# Patient Record
Sex: FEMALE | Race: BLACK OR AFRICAN AMERICAN | NOT HISPANIC OR LATINO | Employment: OTHER | URBAN - METROPOLITAN AREA
[De-identification: names, ages, dates, MRNs, and addresses within clinical notes are randomized per-mention and may not be internally consistent; named-entity substitution may affect disease eponyms.]

---

## 2017-01-23 ENCOUNTER — OFFICE VISIT (OUTPATIENT)
Dept: MEDICAL GROUP | Facility: MEDICAL CENTER | Age: 62
End: 2017-01-23
Payer: MEDICARE

## 2017-01-23 VITALS
HEART RATE: 64 BPM | TEMPERATURE: 98.4 F | BODY MASS INDEX: 26.78 KG/M2 | SYSTOLIC BLOOD PRESSURE: 112 MMHG | RESPIRATION RATE: 14 BRPM | WEIGHT: 170.6 LBS | DIASTOLIC BLOOD PRESSURE: 70 MMHG | OXYGEN SATURATION: 97 % | HEIGHT: 67 IN

## 2017-01-23 DIAGNOSIS — R52 PAIN: ICD-10-CM

## 2017-01-23 DIAGNOSIS — E78.5 DYSLIPIDEMIA: ICD-10-CM

## 2017-01-23 DIAGNOSIS — E05.00 GRAVES' DISEASE: ICD-10-CM

## 2017-01-23 DIAGNOSIS — I10 ESSENTIAL HYPERTENSION: ICD-10-CM

## 2017-01-23 PROCEDURE — 99214 OFFICE O/P EST MOD 30 MIN: CPT | Performed by: INTERNAL MEDICINE

## 2017-01-23 PROCEDURE — 3014F SCREEN MAMMO DOC REV: CPT | Mod: 8P | Performed by: INTERNAL MEDICINE

## 2017-01-23 PROCEDURE — 4004F PT TOBACCO SCREEN RCVD TLK: CPT | Performed by: INTERNAL MEDICINE

## 2017-01-23 PROCEDURE — G8432 DEP SCR NOT DOC, RNG: HCPCS | Performed by: INTERNAL MEDICINE

## 2017-01-23 PROCEDURE — G8419 CALC BMI OUT NRM PARAM NOF/U: HCPCS | Performed by: INTERNAL MEDICINE

## 2017-01-23 PROCEDURE — 3017F COLORECTAL CA SCREEN DOC REV: CPT | Performed by: INTERNAL MEDICINE

## 2017-01-23 PROCEDURE — G8484 FLU IMMUNIZE NO ADMIN: HCPCS | Performed by: INTERNAL MEDICINE

## 2017-01-23 RX ORDER — OXYCODONE HYDROCHLORIDE 5 MG/1
5 TABLET ORAL 2 TIMES DAILY PRN
Qty: 45 TAB | Refills: 0 | Status: SHIPPED | OUTPATIENT
Start: 2017-01-23 | End: 2017-04-21 | Stop reason: SDUPTHER

## 2017-01-23 RX ORDER — OXYCODONE HYDROCHLORIDE 5 MG/1
5 TABLET ORAL 2 TIMES DAILY PRN
Qty: 45 TAB | Refills: 0 | Status: SHIPPED | OUTPATIENT
Start: 2017-01-23 | End: 2017-01-23 | Stop reason: SDUPTHER

## 2017-01-23 RX ORDER — PROPYLTHIOURACIL 50 MG/1
100 TABLET ORAL 2 TIMES DAILY
Qty: 120 TAB | Refills: 3 | Status: SHIPPED | OUTPATIENT
Start: 2017-01-23 | End: 2018-02-06 | Stop reason: SDUPTHER

## 2017-01-23 NOTE — PROGRESS NOTES
"Gia is a 61 y.o. female who is here today because    Chief Complaint   Patient presents with   • Follow-Up     1 month check up        The patient's current problem list and medication list is:    Patient Active Problem List    Diagnosis Date Noted   • Opioid type dependence, continuous (CMS-HCC) 05/01/2015   • Dyslipidemia 09/30/2014   • Graves' disease 01/09/2014   • Diabetes mellitus type 2 in nonobese (CMS-HCC) 09/22/2013   • Essential hypertension 04/15/2013   • Fibroid uterus 06/20/2011   • Chronic headache 11/16/2010   • SOB (shortness of breath) on exertion 09/18/2009   • Seizures (CMS-HCC)    • Depression    • Anxiety    • Arrhythmia        Current Outpatient Prescriptions   Medication Sig Dispense Refill   • propylthiouracil (PTU) 50 MG Tab Take 2 Tabs by mouth 2 times a day. 120 Tab 3   • oxycodone immediate-release (ROXICODONE) 5 MG Tab Take 1 Tab by mouth 2 times a day as needed. 45 Tab 0   • simvastatin (ZOCOR) 20 MG Tab Take 1 Tab by mouth every evening. 90 Tab 1   • metoprolol (LOPRESSOR) 50 MG Tab Take 1 Tab by mouth 2 times a day. 180 Tab 1   • metoprolol SR (TOPROL XL) 50 MG TABLET SR 24 HR Take 1 Tab by mouth 2 Times a Day. 60 Tab 3   • phenytoin ER (DILANTIN) 100 MG CAPS Take 1 Cap by mouth 2 times a day. 60 Cap 2   • nitroglycerin (NITROSTAT) 0.4 MG SUBL Place 1 Tab under tongue as needed for Chest Pain. 25 Tab 1   • albuterol (PROVENTIL HFA) 108 (90 BASE) MCG/ACT AERS Inhale 2 Puffs by mouth every four hours as needed for Shortness of Breath. 1 Inhaler 3   • ASPIRIN 81 MG PO TBEC Take  by mouth. qd        No current facility-administered medications for this visit.     HPI  Gia returns with a friend for one month follow up. Diagnoses of Graves' disease, Pain, Essential hypertension, and Dyslipidemia were pertinent to this visit.    1. Graves' disease  Patient feels like she is starting to \"speed up.\" This has been a signal in the past that her thyroid is becoming overactive. Last TSH " "was in June: 0.96. No specific tachycardia, tremor. She feels like she is losing weight. Our record shows a gain of 4 pounds since mid December.     2. Chronic headaches  Headaches are unchanged. She had agreed to decrease her use of oxycodone after the first of the year. Now says she will try, although it makes her nervous.    3. Essential hypertension  No postural symptoms. Energy level is good.     4. Dyslipidemia  She is tolerating the simvastatin well. Wants to retest before changing the dose. Pretreatment level of LDL ws 143.       ROS Denies chest pain, shortness of breath, changes in bowel and bladder function, lower extremity edema.    Allergies as of 01/23/2017 - Julio as Reviewed 01/23/2017   Allergen Reaction Noted   • Nkda [no known drug allergy]  09/18/2009      /70 mmHg  Pulse 64  Temp(Src) 36.9 °C (98.4 °F)  Resp 14  Ht 1.702 m (5' 7\")  Wt 77.384 kg (170 lb 9.6 oz)  BMI 26.71 kg/m2  SpO2 97%    PHYSICAL EXAMINATION  Gen:         Alert and oriented, No apparent distress. Looks well. No tremor.   Neck:       No Jugular venous distension, Lymphadenopathy, Bruits. Thyroid slightly enlarged and smooth.   Lungs:     Clear to auscultation bilaterally  CV:          Regular rate and rhythm. No murmurs heard  Abd:         Soft, non distended.                    Ext:          No clubbing, cyanosis, edema.    ASSESSMENT AND PLAN     61 y.o. female     1. Graves' disease  Subjectively hyperthyroid again.   - propylthiouracil (PTU) 50 MG Tab; Take 2 Tabs by mouth 2 times a day.  Dispense: 120 Tab; Refill: 3 (HOLD for now)  - TSH; Future  - FREE THYROXINE; Future    2. Chronic headaches  Two months prescriptions given with #45 instead if 60.  - oxycodone immediate-release (ROXICODONE) 5 MG Tab; Take 1 Tab by mouth 2 times a day as needed.  Dispense: 45 Tab; Refill: 0    3. Essential hypertension  Adequately controlled with current medication(s); no indication for change.    4. Dyslipidemia  I suggested " increased the statin; patient declines, wants to retest.                                                                                                           - COMP METABOLIC PANEL; Future  - LIPID PROFILE; Future      Followup: 2 months

## 2017-01-23 NOTE — MR AVS SNAPSHOT
"Gia Guy Marleny   2017 11:00 AM   Office Visit   MRN: [id number]    Department:  [address] [place] [address] Group   Dept Phone:  [phone]    Description:  Female : 1955   Provider:  Moises Pradhan M.D.           Reason for Visit     Follow-Up 1 month check up       Allergies as of 2017     Allergen Noted Reactions    Nkda [No Known Drug Allergy] 2009         You were diagnosed with     Graves' disease   [277437]       Pain   [264996]       Essential hypertension   [4888284]       Dyslipidemia   [263161]         Vital Signs     Blood Pressure Pulse Temperature Respirations Height Weight    112/70 mmHg 64 36.9 °C (98.4 °F) 14 1.702 m (5' 7\") 77.384 kg (170 lb 9.6 oz)    Body Mass Index Oxygen Saturation Smoking Status             26.71 kg/m2 97% Current Every Day Smoker         Basic Information     Date Of Birth Sex Race Ethnicity Preferred Language    1955 Female Black or  Non- English      Your appointments     2017  1:50 PM   MA [id number] with RB MG 3   [facility] ([address])    [address]  [place] [address]   [phone]           No deodorant, powder, perfume or lotion under the arm or breast area.            Mar 23, 2017  8:20 AM   NEW TO YOU with Kindra Gutierrez M.D.   [facility] [address]    [address]  [address]  [place] [address] [phone]              Problem List              ICD-10-CM Priority Class Noted - Resolved    SOB (shortness of breath) on exertion R06.02   2009 - Present    Seizures (CMS-HCC) R56.9   Unknown - Present    Depression F32.9   Unknown - Present    Anxiety F41.9   Unknown - Present    Arrhythmia I49.9   Unknown - Present    Chronic headache (Chronic) R51   2010 - Present    Fibroid uterus D25.9   2011 - Present    Essential hypertension I10   4/15/2013 - Present    Diabetes mellitus type 2 in nonobese (CMS-HCC) E11.9   " 9/22/2013 - Present    Graves' disease E05.00   1/9/2014 - Present    Dyslipidemia E78.5   9/30/2014 - Present    Opioid type dependence, continuous (CMS-HCC) F11.20   5/1/2015 - Present      Health Maintenance        Date Due Completion Dates    IMM HEP B VACCINE (1 of 3 - Primary Series) 1955 ---    IMM DTaP/Tdap/Td Vaccine (1 - Tdap) 9/4/1976 ---    URINE ACR / MICROALBUMIN 8/1/2015 8/1/2014 (Postponed)    Override on 8/1/2014: Postponed    COLONOSCOPY 8/28/2015 8/28/2014    MAMMOGRAM 1/30/2016 1/30/2015, 8/1/2014 (Postponed), 4/26/2013, 12/16/2010    Override on 8/1/2014: Postponed    IMM INFLUENZA (1) 9/1/2016 9/30/2014    DIABETES MONOFILAMENT / LE EXAM 1/14/2017 1/14/2016    A1C SCREENING 6/19/2017 12/19/2016, 10/5/2016, 6/20/2016, 6/2/2015, 3/13/2015, 9/17/2013, 5/17/2007    FASTING LIPID PROFILE 6/20/2017 6/20/2016, 3/13/2015, 9/8/2014, 9/17/2013, 12/16/2010, 11/2/2009, 1/27/2009, 1/31/2008    SERUM CREATININE 6/20/2017 6/20/2016, 6/2/2015, 9/8/2014, 3/21/2013, 10/12/2012, 4/1/2011, 12/16/2010, 11/2/2009, 1/27/2009, 4/22/2008, 1/31/2008, 5/17/2007, 2/12/2007, 9/18/2006, 8/25/2005, 3/24/2005    RETINAL SCREENING 12/19/2017 12/19/2016, 3/31/2016    PAP SMEAR 2/19/2018 2/19/2015, 2/19/2015, 7/9/2013, 6/20/2011            Current Immunizations     Influenza TIV (IM) 9/30/2014  9:00 AM    Pneumococcal polysaccharide vaccine (PPSV-23) 9/30/2014  9:02 AM      Below and/or attached are the medications your provider expects you to take. Review all of your home medications and newly ordered medications with your provider and/or pharmacist. Follow medication instructions as directed by your provider and/or pharmacist. Please keep your medication list with you and share with your provider. Update the information when medications are discontinued, doses are changed, or new medications (including over-the-counter products) are added; and carry medication information at all times in the event of emergency situations       Allergies:  NKDA - (reactions not documented)               Medications  Valid as of: January 23, 2017 - 11:37 AM    Generic Name Brand Name Tablet Size Instructions for use    Albuterol Sulfate (Aero Soln) albuterol 108 (90 BASE) MCG/ACT Inhale 2 Puffs by mouth every four hours as needed for Shortness of Breath.        Aspirin (Tablet Delayed Response) aspirin 81 MG Take  by mouth. qd         Metoprolol Succinate (TABLET SR 24 HR) TOPROL XL 50 MG Take 1 Tab by mouth 2 Times a Day.        Metoprolol Tartrate (Tab) LOPRESSOR 50 MG Take 1 Tab by mouth 2 times a day.        Nitroglycerin (SL Tab) NITROSTAT 0.4 MG Place 1 Tab under tongue as needed for Chest Pain.        OxyCODONE HCl (Tab) ROXICODONE 5 MG Take 1 Tab by mouth 2 times a day as needed.        Phenytoin Sodium Extended (Cap) DILANTIN 100 MG Take 1 Cap by mouth 2 times a day.        Propylthiouracil (Tab) PTU 50 MG Take 2 Tabs by mouth 2 times a day.        Simvastatin (Tab) ZOCOR 20 MG Take 1 Tab by mouth every evening.        .                 Medicines prescribed today were sent to:     [facility] - [facility] [address]    [address] [place] [address]    Phone: [phone] Fax: [phone]    Open 24 Hours?: No      Medication refill instructions:       If your prescription bottle indicates you have medication refills left, it is not necessary to call your provider’s office. Please contact your pharmacy and they will refill your medication.    If your prescription bottle indicates you do not have any refills left, you may request refills at any time through one of the following ways: The online [employer] system (except Urgent Care), by calling your provider’s office, or by asking your pharmacy to contact your provider’s office with a refill request. Medication refills are processed only during regular business hours and may not be available until the next business day. Your provider may request additional information or  to have a follow-up visit with you prior to refilling your medication.   *Please Note: Medication refills are assigned a new Rx number when refilled electronically. Your pharmacy may indicate that no refills were authorized even though a new prescription for the same medication is available at the pharmacy. Please request the medicine by name with the pharmacy before contacting your provider for a refill.        Your To Do List     Future Labs/Procedures Complete By Expires    COMP METABOLIC PANEL  As directed 1/24/2018    FREE THYROXINE  As directed 7/25/2017    LIPID PROFILE  As directed 1/24/2018    TSH  As directed 1/24/2018      Other Notes About Your Plan     [place]----depression/ anxiety  Dr Dobson----endocrinologist  Dr Melissa Bloch--neurology  [facility]           [employer] Access Code: [email or web address]  Expires: 1/26/2017  8:46 AM    [employer]  A secure, online tool to manage your health information     [employer]’s Enterprise[employer]® is a secure, online tool that connects you to your personalized health information from the privacy of your home -- day or night - making it very easy for you to manage your healthcare. Once the activation process is completed, you can even access your medical information using the EnterpriseDB lori, which is available for free in the Apple Lori store or Google Play store.     Enterprise[employer] provides the following levels of access (as shown below):   My Chart Features   [facility] Doctor [facility]  Urgent  Care Non-[facility]  Primary Care  Doctor   Email your healthcare team securely and privately 24/7 X X X    Manage appointments: schedule your next appointment; view details of past/upcoming appointments X      Request prescription refills. X      View recent personal medical records, including lab and immunizations X X X X   View health record, including health history, allergies, medications X X X X   Read reports about your outpatient visits, procedures, consult and ER notes X  X X X   See your discharge summary, which is a recap of your hospital and/or ER visit that includes your diagnosis, lab results, and care plan. X X       How to register for [employer]:  1. Go to  [email or web address].  2. Click on the Sign Up Now box, which takes you to the New Member Sign Up page. You will need to provide the following information:  a. Enter your [employer] Access Code exactly as it appears at the top of this page. (You will not need to use this code after you’ve completed the sign-up process. If you do not sign up before the expiration date, you must request a new code.)   b. Enter your date of birth.   c. Enter your home email address.   d. Click Submit, and follow the next screen’s instructions.  3. Create a [employer] ID. This will be your [employer] login ID and cannot be changed, so think of one that is secure and easy to remember.  4. Create a [employer] password. You can change your password at any time.  5. Enter your Password Reset Question and Answer. This can be used at a later time if you forget your password.   6. Enter your e-mail address. This allows you to receive e-mail notifications when new information is available in [employer].  7. Click Sign Up. You can now view your health information.    For assistance activating your [employer] account, call [phone]

## 2017-01-24 ENCOUNTER — HOSPITAL ENCOUNTER (OUTPATIENT)
Dept: LAB | Facility: MEDICAL CENTER | Age: 62
End: 2017-01-24
Attending: INTERNAL MEDICINE
Payer: MEDICARE

## 2017-01-24 DIAGNOSIS — I10 ESSENTIAL HYPERTENSION: ICD-10-CM

## 2017-01-24 DIAGNOSIS — E78.5 DYSLIPIDEMIA: ICD-10-CM

## 2017-01-24 LAB
ALBUMIN SERPL BCP-MCNC: 3.9 G/DL (ref 3.2–4.9)
ALBUMIN/GLOB SERPL: 1.3 G/DL
ALP SERPL-CCNC: 95 U/L (ref 30–99)
ALT SERPL-CCNC: 20 U/L (ref 2–50)
ANION GAP SERPL CALC-SCNC: 9 MMOL/L (ref 0–11.9)
AST SERPL-CCNC: 17 U/L (ref 12–45)
BILIRUB SERPL-MCNC: 0.5 MG/DL (ref 0.1–1.5)
BUN SERPL-MCNC: 14 MG/DL (ref 8–22)
CALCIUM SERPL-MCNC: 9 MG/DL (ref 8.5–10.5)
CHLORIDE SERPL-SCNC: 109 MMOL/L (ref 96–112)
CHOLEST SERPL-MCNC: 167 MG/DL (ref 100–199)
CO2 SERPL-SCNC: 23 MMOL/L (ref 20–33)
CREAT SERPL-MCNC: 0.73 MG/DL (ref 0.5–1.4)
GLOBULIN SER CALC-MCNC: 3.1 G/DL (ref 1.9–3.5)
GLUCOSE SERPL-MCNC: 144 MG/DL (ref 65–99)
HDLC SERPL-MCNC: 54 MG/DL
LDLC SERPL CALC-MCNC: 98 MG/DL
POTASSIUM SERPL-SCNC: 4 MMOL/L (ref 3.6–5.5)
PROT SERPL-MCNC: 7 G/DL (ref 6–8.2)
SODIUM SERPL-SCNC: 141 MMOL/L (ref 135–145)
T4 FREE SERPL-MCNC: 1.2 NG/DL (ref 0.53–1.43)
TRIGL SERPL-MCNC: 77 MG/DL (ref 0–149)
TSH SERPL DL<=0.005 MIU/L-ACNC: 0.02 UIU/ML (ref 0.3–3.7)

## 2017-01-24 PROCEDURE — 84439 ASSAY OF FREE THYROXINE: CPT

## 2017-01-24 PROCEDURE — 84443 ASSAY THYROID STIM HORMONE: CPT

## 2017-01-24 PROCEDURE — 80053 COMPREHEN METABOLIC PANEL: CPT

## 2017-01-24 PROCEDURE — 36415 COLL VENOUS BLD VENIPUNCTURE: CPT

## 2017-01-24 PROCEDURE — 80061 LIPID PANEL: CPT

## 2017-01-25 ENCOUNTER — TELEPHONE (OUTPATIENT)
Dept: MEDICAL GROUP | Facility: MEDICAL CENTER | Age: 62
End: 2017-01-25

## 2017-01-25 NOTE — TELEPHONE ENCOUNTER
1. Caller Name: Gia                      Call Back Number: [phone] (home)     2. Message: States that she got her labs done yesterday, and wanted to discuss the results with you. They are final too. Please advise.    3. Patient approves office to leave a detailed voicemail/MyChart message: N\A

## 2017-02-13 ENCOUNTER — HOSPITAL ENCOUNTER (OUTPATIENT)
Dept: RADIOLOGY | Facility: MEDICAL CENTER | Age: 62
End: 2017-02-13
Attending: INTERNAL MEDICINE
Payer: MEDICARE

## 2017-02-13 DIAGNOSIS — Z12.39 SCREENING FOR BREAST CANCER: ICD-10-CM

## 2017-02-13 PROCEDURE — 77063 BREAST TOMOSYNTHESIS BI: CPT

## 2017-03-23 ENCOUNTER — OFFICE VISIT (OUTPATIENT)
Dept: MEDICAL GROUP | Facility: MEDICAL CENTER | Age: 62
End: 2017-03-23
Payer: MEDICARE

## 2017-03-23 VITALS
HEART RATE: 105 BPM | HEIGHT: 67 IN | SYSTOLIC BLOOD PRESSURE: 124 MMHG | OXYGEN SATURATION: 95 % | BODY MASS INDEX: 26.84 KG/M2 | WEIGHT: 171 LBS | TEMPERATURE: 98.6 F | DIASTOLIC BLOOD PRESSURE: 82 MMHG | RESPIRATION RATE: 14 BRPM

## 2017-03-23 DIAGNOSIS — I10 ESSENTIAL HYPERTENSION: ICD-10-CM

## 2017-03-23 DIAGNOSIS — G44.329 CHRONIC POST-TRAUMATIC HEADACHE, NOT INTRACTABLE: ICD-10-CM

## 2017-03-23 DIAGNOSIS — E05.00 GRAVES' DISEASE: ICD-10-CM

## 2017-03-23 DIAGNOSIS — R56.9 SEIZURES (HCC): ICD-10-CM

## 2017-03-23 DIAGNOSIS — F11.20 OPIOID TYPE DEPENDENCE, CONTINUOUS (HCC): ICD-10-CM

## 2017-03-23 PROCEDURE — 3014F SCREEN MAMMO DOC REV: CPT | Performed by: FAMILY MEDICINE

## 2017-03-23 PROCEDURE — 99214 OFFICE O/P EST MOD 30 MIN: CPT | Performed by: FAMILY MEDICINE

## 2017-03-23 PROCEDURE — G8432 DEP SCR NOT DOC, RNG: HCPCS | Performed by: FAMILY MEDICINE

## 2017-03-23 PROCEDURE — G8419 CALC BMI OUT NRM PARAM NOF/U: HCPCS | Performed by: FAMILY MEDICINE

## 2017-03-23 PROCEDURE — G8484 FLU IMMUNIZE NO ADMIN: HCPCS | Performed by: FAMILY MEDICINE

## 2017-03-23 PROCEDURE — 4004F PT TOBACCO SCREEN RCVD TLK: CPT | Performed by: FAMILY MEDICINE

## 2017-03-23 RX ORDER — PHENYTOIN SODIUM 100 MG/1
100 CAPSULE, EXTENDED RELEASE ORAL DAILY
Qty: 1 CAP | Refills: 0
Start: 2017-03-23 | End: 2018-07-31

## 2017-03-23 RX ORDER — NITROGLYCERIN 0.4 MG/1
0.4 TABLET SUBLINGUAL PRN
Qty: 25 TAB | Refills: 1 | Status: SHIPPED | OUTPATIENT
Start: 2017-03-23 | End: 2023-12-06

## 2017-03-23 RX ORDER — ALBUTEROL SULFATE 90 UG/1
2 AEROSOL, METERED RESPIRATORY (INHALATION) EVERY 4 HOURS PRN
Qty: 1 INHALER | Refills: 3 | Status: SHIPPED | OUTPATIENT
Start: 2017-03-23 | End: 2020-07-27 | Stop reason: SDUPTHER

## 2017-03-23 ASSESSMENT — LIFESTYLE VARIABLES: HISTORY_ALCOHOL_USE: 0

## 2017-03-23 ASSESSMENT — ENCOUNTER SYMPTOMS: DEPRESSION: 1
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
## 2017-03-31 ENCOUNTER — APPOINTMENT (OUTPATIENT)
Dept: MEDICAL GROUP | Facility: MEDICAL CENTER | Age: 62
End: 2017-03-31
Payer: MEDICARE

## 2017-04-21 ENCOUNTER — OFFICE VISIT (OUTPATIENT)
Dept: MEDICAL GROUP | Facility: MEDICAL CENTER | Age: 62
End: 2017-04-21
Payer: MEDICARE

## 2017-04-21 ENCOUNTER — HOSPITAL ENCOUNTER (OUTPATIENT)
Facility: MEDICAL CENTER | Age: 62
End: 2017-04-21
Attending: FAMILY MEDICINE
Payer: MEDICARE

## 2017-04-21 VITALS
DIASTOLIC BLOOD PRESSURE: 72 MMHG | RESPIRATION RATE: 16 BRPM | WEIGHT: 175 LBS | HEIGHT: 67 IN | HEART RATE: 90 BPM | SYSTOLIC BLOOD PRESSURE: 120 MMHG | OXYGEN SATURATION: 96 % | BODY MASS INDEX: 27.47 KG/M2 | TEMPERATURE: 98.1 F

## 2017-04-21 DIAGNOSIS — E11.65 TYPE 2 DIABETES MELLITUS WITH HYPERGLYCEMIA, WITHOUT LONG-TERM CURRENT USE OF INSULIN (HCC): ICD-10-CM

## 2017-04-21 DIAGNOSIS — R30.0 DYSURIA: ICD-10-CM

## 2017-04-21 DIAGNOSIS — G44.329 CHRONIC POST-TRAUMATIC HEADACHE, NOT INTRACTABLE: ICD-10-CM

## 2017-04-21 DIAGNOSIS — F11.20 OPIOID TYPE DEPENDENCE, CONTINUOUS (HCC): ICD-10-CM

## 2017-04-21 PROBLEM — Z72.0 TOBACCO USE: Status: ACTIVE | Noted: 2017-04-21

## 2017-04-21 LAB
APPEARANCE UR: CLEAR
BILIRUB UR STRIP-MCNC: NORMAL MG/DL
COLOR UR AUTO: YELLOW
CREAT UR-MCNC: 291.8 MG/DL
GLUCOSE UR STRIP.AUTO-MCNC: 2000 MG/DL
HBA1C MFR BLD: 7.8 % (ref ?–5.8)
INT CON NEG: NEGATIVE
INT CON POS: POSITIVE
KETONES UR STRIP.AUTO-MCNC: NORMAL MG/DL
LEUKOCYTE ESTERASE UR QL STRIP.AUTO: NORMAL
MICROALBUMIN UR-MCNC: 1.4 MG/DL
MICROALBUMIN/CREAT UR: 5 MG/G (ref 0–30)
NITRITE UR QL STRIP.AUTO: NORMAL
PH UR STRIP.AUTO: 5 [PH] (ref 5–8)
PROT UR QL STRIP: NORMAL MG/DL
RBC UR QL AUTO: NORMAL
SP GR UR STRIP.AUTO: 1.03
UROBILINOGEN UR STRIP-MCNC: NORMAL MG/DL

## 2017-04-21 PROCEDURE — 3045F PR MOST RECENT HEMOGLOBIN A1C LEVEL 7.0-9.0%: CPT | Performed by: FAMILY MEDICINE

## 2017-04-21 PROCEDURE — 4004F PT TOBACCO SCREEN RCVD TLK: CPT | Performed by: FAMILY MEDICINE

## 2017-04-21 PROCEDURE — 83036 HEMOGLOBIN GLYCOSYLATED A1C: CPT | Performed by: FAMILY MEDICINE

## 2017-04-21 PROCEDURE — G8432 DEP SCR NOT DOC, RNG: HCPCS | Performed by: FAMILY MEDICINE

## 2017-04-21 PROCEDURE — 81002 URINALYSIS NONAUTO W/O SCOPE: CPT | Performed by: FAMILY MEDICINE

## 2017-04-21 PROCEDURE — G8417 CALC BMI ABV UP PARAM F/U: HCPCS | Performed by: FAMILY MEDICINE

## 2017-04-21 PROCEDURE — 82043 UR ALBUMIN QUANTITATIVE: CPT

## 2017-04-21 PROCEDURE — 82570 ASSAY OF URINE CREATININE: CPT

## 2017-04-21 PROCEDURE — 3014F SCREEN MAMMO DOC REV: CPT | Performed by: FAMILY MEDICINE

## 2017-04-21 PROCEDURE — 99213 OFFICE O/P EST LOW 20 MIN: CPT | Performed by: FAMILY MEDICINE

## 2017-04-21 RX ORDER — OXYCODONE HYDROCHLORIDE 5 MG/1
5 TABLET ORAL 2 TIMES DAILY PRN
Qty: 45 TAB | Refills: 0 | Status: SHIPPED | OUTPATIENT
Start: 2017-04-21 | End: 2017-07-21

## 2017-04-21 ASSESSMENT — PAIN SCALES - GENERAL: PAINLEVEL: NO PAIN
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
## 2017-04-21 NOTE — MR AVS SNAPSHOT
"        Gia KUHNSEB   2017 1:40 PM   Office Visit   MRN: [id number]    Department:  [address] [place] [address] Group   Dept Phone:  [phone]    Description:  Female : 1955   Provider:  Kindra Gutierrez M.D.           Reason for Visit     Follow-Up bp, thyroid check, refills      Allergies as of 2017     No Known Allergies      You were diagnosed with     Type 2 diabetes mellitus with hyperglycemia, without long-term current use of insulin (CMS-HCC)   [5395330]       Dysuria   [788.1.ICD-9-CM]       Chronic post-traumatic headache, not intractable   [235497]       Opioid type dependence, continuous (CMS-[facility])   [304.01.ICD-9-CM]         Vital Signs     Blood Pressure Pulse Temperature Respirations Height Weight    120/72 mmHg 90 36.7 °C (98.1 °F) 16 1.702 m (5' 7.01\") 79.379 kg (175 lb)    Body Mass Index Oxygen Saturation Smoking Status             27.40 kg/m2 96% Current Every Day Smoker         Basic Information     Date Of Birth Sex Race Ethnicity Preferred Language    1955 Female Black or  Non- English      Your appointments     Jul 10, 2017  7:20 AM   New Patient with Elvin Monterroso M.D.   [facility]    [address] [facility], [address]  [facility] [address] [phone]           Please bring Photo ID, Insurance Cards, All Medication Bottles and copies of any legal documents (such as Living Will, Power of ) If speaking a language besides English please bring an adult . Please arrive 30 minutes prior for check in and registration. You will be receiving a confirmation call a few days before your appointment from our automated call confirmation system.            2017  9:40 AM   Established Patient with Kindra Gutierrez M.D.   [facility] [address]     [address]  [facility] [address]  [facility] [facility] [id number] [phone]           You will be receiving a confirmation call a few " days before your appointment from our automated call confirmation system.              Problem List              ICD-10-CM Priority Class Noted - Resolved    Seizures (CMS-HCC) R56.9   Unknown - Present    Depression F32.9   Unknown - Present    Anxiety F41.9   Unknown - Present    Arrhythmia I49.9   Unknown - Present    Fibroid uterus D25.9   6/20/2011 - Present    Essential hypertension I10   4/15/2013 - Present    Graves' disease E05.00   1/9/2014 - Present    Dyslipidemia E78.5   9/30/2014 - Present    Opioid type dependence, continuous (CMS-[facility]) F11.20   5/1/2015 - Present    Chronic post-traumatic headache, not intractable G44.329   3/23/2017 - Present    Tobacco use Z72.0   4/21/2017 - Present    Type 2 diabetes mellitus with hyperglycemia, without long-term current use of insulin ([facility]) E11.65   4/21/2017 - Present      Health Maintenance        Date Due Completion Dates    IMM HEP B VACCINE (1 of 3 - Primary Series) 1955 ---    IMM DTaP/Tdap/Td Vaccine (1 - Tdap) 9/4/1976 ---    URINE ACR / MICROALBUMIN 8/1/2015 8/1/2014 (Postponed)    Override on 8/1/2014: Postponed    COLONOSCOPY 8/28/2015 8/28/2014    DIABETES MONOFILAMENT / LE EXAM 1/14/2017 1/14/2016    A1C SCREENING 6/19/2017 12/19/2016, 10/5/2016, 6/20/2016, 6/2/2015, 3/13/2015, 9/17/2013, 5/17/2007    RETINAL SCREENING 12/19/2017 12/19/2016, 3/31/2016    FASTING LIPID PROFILE 1/24/2018 1/24/2017, 6/20/2016, 3/13/2015, 9/8/2014, 9/17/2013, 12/16/2010, 11/2/2009, 1/27/2009, 1/31/2008    SERUM CREATININE 1/24/2018 1/24/2017, 6/20/2016, 6/2/2015, 9/8/2014, 3/21/2013, 10/12/2012, 4/1/2011, 12/16/2010, 11/2/2009, 1/27/2009, 4/22/2008, 1/31/2008, 5/17/2007, 2/12/2007, 9/18/2006, 8/25/2005, 3/24/2005    MAMMOGRAM 2/13/2018 2/13/2017, 1/30/2015, 8/1/2014 (Postponed), 4/26/2013, 12/16/2010    Override on 8/1/2014: Postponed    PAP SMEAR 2/19/2018 2/19/2015, 2/19/2015, 7/9/2013, 6/20/2011            Current Immunizations     Influenza TIV (IM) 9/30/2014   9:00 AM    Pneumococcal polysaccharide vaccine (PPSV-23) 9/30/2014  9:02 AM      Below and/or attached are the medications your provider expects you to take. Review all of your home medications and newly ordered medications with your provider and/or pharmacist. Follow medication instructions as directed by your provider and/or pharmacist. Please keep your medication list with you and share with your provider. Update the information when medications are discontinued, doses are changed, or new medications (including over-the-counter products) are added; and carry medication information at all times in the event of emergency situations     Allergies:  No Known Allergies          Medications  Valid as of: April 21, 2017 -  1:45 PM    Generic Name Brand Name Tablet Size Instructions for use    Albuterol Sulfate (Aero Soln) albuterol 108 (90 BASE) MCG/ACT Inhale 2 Puffs by mouth every four hours as needed for Shortness of Breath.        Aspirin (Tablet Delayed Response) aspirin 81 MG Take  by mouth. qd         MetFORMIN HCl (Tab) GLUCOPHAGE 500 MG Take 1 Tab by mouth 2 times a day, with meals.        Metoprolol Tartrate (Tab) LOPRESSOR 50 MG Take 1 Tab by mouth 2 times a day.        Nitroglycerin (SL Tab) NITROSTAT 0.4 MG Place 1 Tab under tongue as needed for Chest Pain.        OxyCODONE HCl (Tab) ROXICODONE 5 MG Take 1 Tab by mouth 2 times a day as needed for Severe Pain.        Phenytoin Sodium Extended (Cap) DILANTIN 100 MG Take 1 Cap by mouth every day.        Propylthiouracil (Tab) PTU 50 MG Take 2 Tabs by mouth 2 times a day.        Simvastatin (Tab) ZOCOR 20 MG Take 1 Tab by mouth every evening.        .                 Medicines prescribed today were sent to:     [facility] [address] [place] [address] [place] - [address] [facility] [address]    Phone: [phone] Fax: [phone]    Open 24 Hours?: No      Medication refill instructions:       If your prescription bottle indicates you have medication refills  left, it is not necessary to call your provider’s office. Please contact your pharmacy and they will refill your medication.    If your prescription bottle indicates you do not have any refills left, you may request refills at any time through one of the following ways: The online [employer] system (except Urgent Care), by calling your provider’s office, or by asking your pharmacy to contact your provider’s office with a refill request. Medication refills are processed only during regular business hours and may not be available until the next business day. Your provider may request additional information or to have a follow-up visit with you prior to refilling your medication.   *Please Note: Medication refills are assigned a new Rx number when refilled electronically. Your pharmacy may indicate that no refills were authorized even though a new prescription for the same medication is available at the pharmacy. Please request the medicine by name with the pharmacy before contacting your provider for a refill.        Your To Do List     Future Labs/Procedures Complete By Expires    MICROALBUMIN CREAT RATIO URINE  As directed 4/21/2018      Referral     A referral request has been sent to our patient care coordination department. Please allow 3-5 business days for us to process this request and contact you either by phone or mail. If you do not hear from us by the 5th business day, please call us at [phone].        Other Notes About Your Plan     [facility]----depression/ anxiety  Dr Dobson----endocrinologist  Dr Melissa Bloch--neurology  [facility] Status: Patient Declined        Quit Tobacco Information     Do you want to quit using tobacco?    Quitting tobacco decreases risks of cancer, heart and lung disease, increases life expectancy, improves sense of taste and smell, and increases spending money, among other benefits.    If you are thinking about quitting, we can help.  • [facility] Quit Tobacco  Program: 450.696.9309  o Program occurs weekly for four weeks and includes pharmacist consultation on products to support quitting smoking or chewing tobacco. A provider referral is needed for pharmacist consultation.  • Tobacco Users Help Hotline: 6-800-QUIT-NOW (513-5415) or https://[place].quitlogix.org/  o Free, confidential telephone and online coaching for [place] residents. Sessions are designed on a schedule that is convenient for you. Eligible clients receive free nicotine replacement therapy.  • Nationally: www.smokefree.gov  o Information and professional assistance to support both immediate and long-term needs as you become, and remain, a non-smoker. Smokefree.gov allows you to choose the help that best fits your needs.

## 2017-05-16 DIAGNOSIS — E78.5 DYSLIPIDEMIA: ICD-10-CM

## 2017-05-16 RX ORDER — SIMVASTATIN 20 MG
20 TABLET ORAL EVERY EVENING
Qty: 90 TAB | Refills: 3 | Status: SHIPPED | OUTPATIENT
Start: 2017-05-16 | End: 2018-07-12 | Stop reason: SDUPTHER

## 2017-07-21 ENCOUNTER — OFFICE VISIT (OUTPATIENT)
Dept: MEDICAL GROUP | Facility: MEDICAL CENTER | Age: 62
End: 2017-07-21
Payer: MEDICARE

## 2017-07-21 VITALS
TEMPERATURE: 98.7 F | HEIGHT: 67 IN | DIASTOLIC BLOOD PRESSURE: 82 MMHG | WEIGHT: 171 LBS | BODY MASS INDEX: 26.84 KG/M2 | SYSTOLIC BLOOD PRESSURE: 130 MMHG | OXYGEN SATURATION: 98 % | RESPIRATION RATE: 16 BRPM | HEART RATE: 74 BPM

## 2017-07-21 DIAGNOSIS — Z72.0 TOBACCO USE: ICD-10-CM

## 2017-07-21 DIAGNOSIS — E78.5 DYSLIPIDEMIA: ICD-10-CM

## 2017-07-21 DIAGNOSIS — E11.65 TYPE 2 DIABETES MELLITUS WITH HYPERGLYCEMIA, WITHOUT LONG-TERM CURRENT USE OF INSULIN (HCC): ICD-10-CM

## 2017-07-21 DIAGNOSIS — Z12.11 SCREEN FOR COLON CANCER: ICD-10-CM

## 2017-07-21 DIAGNOSIS — G44.329 CHRONIC POST-TRAUMATIC HEADACHE, NOT INTRACTABLE: ICD-10-CM

## 2017-07-21 DIAGNOSIS — R51.9 NONINTRACTABLE HEADACHE, UNSPECIFIED CHRONICITY PATTERN, UNSPECIFIED HEADACHE TYPE: ICD-10-CM

## 2017-07-21 DIAGNOSIS — I10 ESSENTIAL HYPERTENSION: ICD-10-CM

## 2017-07-21 LAB
HBA1C MFR BLD: 7 % (ref ?–5.8)
INT CON NEG: NORMAL
INT CON POS: NORMAL

## 2017-07-21 PROCEDURE — 99214 OFFICE O/P EST MOD 30 MIN: CPT | Performed by: FAMILY MEDICINE

## 2017-07-21 PROCEDURE — 83036 HEMOGLOBIN GLYCOSYLATED A1C: CPT | Performed by: FAMILY MEDICINE

## 2017-07-21 RX ORDER — LANCETS 30 GAUGE
EACH MISCELLANEOUS
Qty: 100 EACH | Refills: 3 | Status: SHIPPED | OUTPATIENT
Start: 2017-07-21 | End: 2023-04-12 | Stop reason: SDUPTHER

## 2017-07-21 NOTE — PROGRESS NOTES
cc:  diabetes    Subjective:     Gia GUEVARA is a 61 y.o. female presenting for a diabetes check:  --asa: takes 81mg daily, no stomach or stool issues  --a1c:  was 7.8 on 4/2017, today is 7.0.  BGs at home have been in the 140-150s.  Is currently taking metformin 500mg once a day, occasionally will take it twice a day. Has improved her diet, is also swimming daily.  --BP: normal today.  Has been normotensive. Denies any chest pain, sob, leg swelling.  Is currently on metoprolol 50mg daily.  Last BMP was 1/2017  --LDL: last checked 1/2017.  Is on zocor 20 mg daily.  No myalgias  --tobacco: smoking, has been cutting down, is down to 3 cig/day.  Wants to quit eventually.    --eye exam: last exam 12/2016.  No vision changes.  --foot exam: last exam 1/2016.  Denies any foot, skin problems.  No numbness/tingling.  --microalbumin: last checked 4/2017.  No urinary symptoms.    Review of systems:  See above.  She also reports her headaches have returned, is drinking caffeinated products which seem to help.  Was on oxycodone for this, but prescriptions were discontinued as she refused to give a urine sample back in April.    Current outpatient prescriptions:   •  Lancets Misc, Use 1x/day and PRN for signs and symptoms of high or low blood sugar. Dx E 11.65, Disp: 100 Each, Rfl: 3  •  Blood Glucose Monitoring Suppl Kit, Test blood sugar as recommended by provider.  Dx E 11.65, Disp: 1 Kit, Rfl: PRN  •  Blood Glucose Monitoring Suppl SUPPLIES Misc, For test strips: Use 1x/day and PRN for signs and symptoms of high or low blood sugar. Dx E 11.65, Disp: 100 Each, Rfl: 3  •  metformin (GLUCOPHAGE) 500 MG Tab, Take 1 Tab by mouth every day., Disp: , Rfl:   •  simvastatin (ZOCOR) 20 MG Tab, Take 1 Tab by mouth every evening. Indications: high cholesterol, Disp: 90 Tab, Rfl: 3  •  phenytoin ER (DILANTIN) 100 MG Cap, Take 1 Cap by mouth every day., Disp: 1 Cap, Rfl: 0  •  albuterol (PROVENTIL HFA) 108 (90 BASE) MCG/ACT Aero  "Soln inhalation aerosol, Inhale 2 Puffs by mouth every four hours as needed for Shortness of Breath., Disp: 1 Inhaler, Rfl: 3  •  nitroglycerin (NITROSTAT) 0.4 MG SL Tab, Place 1 Tab under tongue as needed for Chest Pain., Disp: 25 Tab, Rfl: 1  •  propylthiouracil (PTU) 50 MG Tab, Take 2 Tabs by mouth 2 times a day., Disp: 120 Tab, Rfl: 3  •  metoprolol (LOPRESSOR) 50 MG Tab, Take 1 Tab by mouth 2 times a day., Disp: 180 Tab, Rfl: 1  •  ASPIRIN 81 MG PO TBEC, Take  by mouth. qd , Disp: , Rfl:     No Known Allergies    Past Medical History   Diagnosis Date   • Arrhythmia    • Chronic headache    • Depression    • Anxiety    • Seizures (CMS-HCC)    • Grave's disease    • Meningitis 1950   • Urinary tract infection, site not specified      Past Surgical History   Procedure Laterality Date   • Tubal ligation       Family History   Problem Relation Age of Onset   • Diabetes Mother    • Heart Disease Mother      MI   • Hypertension Mother    • Stroke Father    • Hypertension Father    • Diabetes Sister      Social History     Social History   • Marital Status: Single     Spouse Name: N/A   • Number of Children: N/A   • Years of Education: N/A     Occupational History   • Not on file.     Social History Main Topics   • Smoking status: Current Every Day Smoker -- 0.25 packs/day for 22 years     Types: Cigarettes   • Smokeless tobacco: Never Used      Comment: 1-2 cigs/day   • Alcohol Use: No   • Drug Use: No   • Sexual Activity: Not Currently     Other Topics Concern   • Not on file     Social History Narrative       Objective:     Vitals: /82 mmHg  Pulse 74  Temp(Src) 37.1 °C (98.7 °F)  Resp 16  Ht 1.702 m (5' 7\")  Wt 77.565 kg (171 lb)  BMI 26.78 kg/m2  SpO2 98%  General: Alert, pleasant, NAD  HEENT: Normocephalic.    Skin: Warm, dry, no rashes.  Musculoskeletal: Gait is normal.  Moves all extremities well.  Extremities: Distal pulses 2+ symmetric.  No edema, skin lesions; toe nails clean.   temperature and " vibratory sensation intact.  Normal monofilament.  Achilles reflexes 2+ symmetric.  Psych:  Affect/mood is normal, judgement is good, memory is intact, grooming is appropriate.    Assessment/Plan:     Gia was seen today for follow-up and medication refill.    Diagnoses and all orders for this visit:    Type 2 diabetes mellitus with hyperglycemia, without long-term current use of insulin (HCC)  Improved on the once a day metformin 500mg. We'll continue with the same regimen. Foot exam was normal. Is up-to-date on labs. F/u in 3 months  -     POCT Hemoglobin A1C  -     Lancets Misc; Use 1x/day and PRN for signs and symptoms of high or low blood sugar. Dx E 11.65  -     Blood Glucose Monitoring Suppl Kit; Test blood sugar as recommended by provider.  Dx E 11.65  -     Blood Glucose Monitoring Suppl SUPPLIES Misc; For test strips: Use 1x/day and PRN for signs and symptoms of high or low blood sugar. Dx E 11.65  -     Diabetic Monofilament Lower Extremity Exam    Essential hypertension  Stable, labs are up-to-date. Continue metoprolol     Dyslipidemia  Stable, labs are up-to-date. Continue simvastatin    Tobacco use  Advised to quit, patient is working on this.    Nonintractable headache, unspecified chronicity pattern, unspecified headache type  Chronic post-traumatic headache, not intractable  Is now off the oxycodone. Recommended trying Excedrin for her headaches as needed. Referral to the headache clinic also placed per patient request  -     REFERRAL TO NEUROLOGY    Screen for colon cancer  -     OCCULT BLOOD FECES IMMUNOASSAY (FIT); Future        Return in about 3 months (around 10/21/2017) for Diabetes.

## 2017-07-21 NOTE — MR AVS SNAPSHOT
"        Gia Guy PAOLA   2017 9:40 AM   Office Visit   MRN: [id number]    Department:  [address] [place] [address]   Dept Phone:  [phone]    Description:  Female : 1955   Provider:  Kindra Gutierrez M.D.           Reason for Visit     Follow-Up     Medication Refill           Allergies as of 2017     No Known Allergies      You were diagnosed with     Type 2 diabetes mellitus with hyperglycemia, without long-term current use of insulin (CMS-HCC)   [0109763]       Tobacco use   [374897]       Essential hypertension   [8766020]       Nonintractable headache, unspecified chronicity pattern, unspecified headache type   [6353673]       Screen for colon cancer   [746148]         Vital Signs     Blood Pressure Pulse Temperature Respirations Height Weight    130/82 mmHg 74 37.1 °C (98.7 °F) 16 1.702 m (5' 7\") 77.565 kg (171 lb)    Body Mass Index Oxygen Saturation Smoking Status             26.78 kg/m2 98% Current Every Day Smoker         Basic Information     Date Of Birth Sex Race Ethnicity Preferred Language    1955 Female Black or  Non- English      Your appointments     Oct 23, 2017 10:40 AM   Established Patient with Kindra Gutierrez M.D.   [facility] [address] ([facility])    [address] [id number] [phone]           You will be receiving a confirmation call a few days before your appointment from our automated call confirmation system.              Problem List              ICD-10-CM Priority Class Noted - Resolved    Seizures (CMS-HCC) R56.9   Unknown - Present    Arrhythmia I49.9   Unknown - Present    Fibroid uterus D25.9   2011 - Present    Essential hypertension I10   4/15/2013 - Present    Graves' disease E05.00   2014 - Present    Dyslipidemia E78.5   2014 - Present    Opioid type dependence, continuous (CMS-HCC) F11.20   2015 - Present    Chronic post-traumatic headache, not intractable G44.329   " 3/23/2017 - Present    Tobacco use Z72.0   4/21/2017 - Present    Type 2 diabetes mellitus with hyperglycemia, without long-term current use of insulin (HCC) E11.65   4/21/2017 - Present      Health Maintenance        Date Due Completion Dates    IMM HEP B VACCINE (1 of 3 - Primary Series) 1955 ---    IMM DTaP/Tdap/Td Vaccine (1 - Tdap) 9/4/1976 ---    COLONOSCOPY 8/28/2015 8/28/2014    DIABETES MONOFILAMENT / LE EXAM 1/14/2017 1/14/2016    IMM INFLUENZA (1) 9/1/2017 9/30/2014    A1C SCREENING 10/21/2017 4/21/2017, 12/19/2016, 10/5/2016, 6/20/2016, 6/2/2015, 3/13/2015, 9/17/2013, 5/17/2007    RETINAL SCREENING 12/19/2017 12/19/2016, 3/31/2016    FASTING LIPID PROFILE 1/24/2018 1/24/2017, 6/20/2016, 3/13/2015, 9/8/2014, 9/17/2013, 12/16/2010, 11/2/2009, 1/27/2009, 1/31/2008    SERUM CREATININE 1/24/2018 1/24/2017, 6/20/2016, 6/2/2015, 9/8/2014, 3/21/2013, 10/12/2012, 4/1/2011, 12/16/2010, 11/2/2009, 1/27/2009, 4/22/2008, 1/31/2008, 5/17/2007, 2/12/2007, 9/18/2006, 8/25/2005, 3/24/2005    MAMMOGRAM 2/13/2018 2/13/2017, 1/30/2015, 8/1/2014 (Postponed), 4/26/2013, 12/16/2010    Override on 8/1/2014: Postponed    PAP SMEAR 2/19/2018 2/19/2015, 2/19/2015, 7/9/2013, 6/20/2011    URINE ACR / MICROALBUMIN 4/21/2018 4/21/2017, 8/1/2014 (Postponed)    Override on 8/1/2014: Postponed            Current Immunizations     Influenza TIV (IM) 9/30/2014  9:00 AM    Pneumococcal polysaccharide vaccine (PPSV-23) 9/30/2014  9:02 AM      Below and/or attached are the medications your provider expects you to take. Review all of your home medications and newly ordered medications with your provider and/or pharmacist. Follow medication instructions as directed by your provider and/or pharmacist. Please keep your medication list with you and share with your provider. Update the information when medications are discontinued, doses are changed, or new medications (including over-the-counter products) are added; and carry medication  information at all times in the event of emergency situations     Allergies:  No Known Allergies          Medications  Valid as of: July 21, 2017 -  9:58 AM    Generic Name Brand Name Tablet Size Instructions for use    Albuterol Sulfate (Aero Soln) albuterol 108 (90 BASE) MCG/ACT Inhale 2 Puffs by mouth every four hours as needed for Shortness of Breath.        Aspirin (Tablet Delayed Response) aspirin 81 MG Take  by mouth. qd         Blood Glucose Monitoring Suppl (Kit) Blood Glucose Monitoring Suppl  Test blood sugar as recommended by provider.  Dx E 11.65        Blood Glucose Monitoring Suppl (Misc) Blood Glucose Monitoring Suppl SUPPLIES For test strips: Use 1x/day and PRN for signs and symptoms of high or low blood sugar. Dx E 11.65        Lancets (Misc) Lancets  Use 1x/day and PRN for signs and symptoms of high or low blood sugar. Dx E 11.65        MetFORMIN HCl (Tab) GLUCOPHAGE 500 MG Take 1 Tab by mouth every day.        Metoprolol Tartrate (Tab) LOPRESSOR 50 MG Take 1 Tab by mouth 2 times a day.        Nitroglycerin (SL Tab) NITROSTAT 0.4 MG Place 1 Tab under tongue as needed for Chest Pain.        Phenytoin Sodium Extended (Cap) DILANTIN 100 MG Take 1 Cap by mouth every day.        Propylthiouracil (Tab) PTU 50 MG Take 2 Tabs by mouth 2 times a day.        Simvastatin (Tab) ZOCOR 20 MG Take 1 Tab by mouth every evening. Indications: high cholesterol        .                 Medicines prescribed today were sent to:     [facility] [address] [place] [address] - [address]     [address] [facility] [address]    Phone: [phone] Fax: [phone]    Open 24 Hours?: No      Medication refill instructions:       If your prescription bottle indicates you have medication refills left, it is not necessary to call your provider’s office. Please contact your pharmacy and they will refill your medication.    If your prescription bottle indicates you do not have any refills left, you may request refills at any time through  one of the following ways: The online [employer] system (except Urgent Care), by calling your provider’s office, or by asking your pharmacy to contact your provider’s office with a refill request. Medication refills are processed only during regular business hours and may not be available until the next business day. Your provider may request additional information or to have a follow-up visit with you prior to refilling your medication.   *Please Note: Medication refills are assigned a new Rx number when refilled electronically. Your pharmacy may indicate that no refills were authorized even though a new prescription for the same medication is available at the pharmacy. Please request the medicine by name with the pharmacy before contacting your provider for a refill.        Your To Do List     Future Labs/Procedures Complete By Expires    OCCULT BLOOD FECES IMMUNOASSAY (FIT)  As directed 7/21/2018      Referral     A referral request has been sent to our patient care coordination department. Please allow 3-5 business days for us to process this request and contact you either by phone or mail. If you do not hear from us by the 5th business day, please call us at [phone].        Other Notes About Your Plan     [place]----depression/ anxiety  Dr Dobson----endocrinologist  Dr Melissa Bloch--neurology  -[place]           ChinaNetCenter Access Code: [email or web address]  Expires: 8/17/2017  4:10 AM    [employer]Center  A secure, online tool to manage your health information     [employer]’s ChinaNetCenter® is a secure, online tool that connects you to your personalized health information from the privacy of your home -- day or night - making it very easy for you to manage your healthcare. Once the activation process is completed, you can even access your medical information using the [employer]Center lori, which is available for free in the Apple Lori store or Google Play store.     [employer]Center provides the following levels of access (as shown below):   My  Chart Features   [facility] Doctor [facility]  Specialists [facility]  Urgent  Care Non-[facility]  Primary Care  Doctor   Email your healthcare team securely and privately 24/7 X X X    Manage appointments: schedule your next appointment; view details of past/upcoming appointments X      Request prescription refills. X      View recent personal medical records, including lab and immunizations X X X X   View health record, including health history, allergies, medications X X X X   Read reports about your outpatient visits, procedures, consult and ER notes X X X X   See your discharge summary, which is a recap of your hospital and/or ER visit that includes your diagnosis, lab results, and care plan. X X       How to register for AppMyDay:  1. Go to  [email or web address].  2. Click on the Sign Up Now box, which takes you to the New Member Sign Up page. You will need to provide the following information:  a. Enter your AppMyDay Access Code exactly as it appears at the top of this page. (You will not need to use this code after you’ve completed the sign-up process. If you do not sign up before the expiration date, you must request a new code.)   b. Enter your date of birth.   c. Enter your home email address.   d. Click Submit, and follow the next screen’s instructions.  3. Create a AppMyDay ID. This will be your AppMyDay login ID and cannot be changed, so think of one that is secure and easy to remember.  4. Create a AppMyDay password. You can change your password at any time.  5. Enter your Password Reset Question and Answer. This can be used at a later time if you forget your password.   6. Enter your e-mail address. This allows you to receive e-mail notifications when new information is available in AppMyDay.  7. Click Sign Up. You can now view your health information.    For assistance activating your AppMyDay account, call [phone]        Quit Tobacco Information     Do you want to quit using  tobacco?    Quitting tobacco decreases risks of cancer, heart and lung disease, increases life expectancy, improves sense of taste and smell, and increases spending money, among other benefits.    If you are thinking about quitting, we can help.  • [facility] Quit Tobacco Program: [phone]  o Program occurs weekly for four weeks and includes pharmacist consultation on products to support quitting smoking or chewing tobacco. A provider referral is needed for pharmacist consultation.  • Tobacco Users Help Hotline: 8-127-QUIT-NOW (799-6867) or https://[place].quitlogix.org/  o Free, confidential telephone and online coaching for [place] residents. Sessions are designed on a schedule that is convenient for you. Eligible clients receive free nicotine replacement therapy.  • Nationally: www.smokefree.gov  o Information and professional assistance to support both immediate and long-term needs as you become, and remain, a non-smoker. Smokefree.gov allows you to choose the help that best fits your needs.

## 2017-09-08 ENCOUNTER — PATIENT OUTREACH (OUTPATIENT)
Dept: HEALTH INFORMATION MANAGEMENT | Facility: OTHER | Age: 62
End: 2017-09-08

## 2017-09-08 NOTE — PROGRESS NOTES
Outcome: No answer / No message     WebIZ Checked & Epic Updated:  yes    HealthConnect Verified: no    Attempt # 1

## 2017-09-15 NOTE — PROGRESS NOTES
Attempt #:3    WebIZ Checked & Epic Updated: yes  HealthConnect Verified: no  Verify PCP: yes    Communication Preference Obtained: yes     Review Care Team: yes    Annual Wellness Visit Scheduling  1. Scheduling Status:Scheduled     Care Gap Scheduling (Attempt to Schedule EACH Overdue Care Gap!)     Health Maintenance Due   Topic Date Due   • COLONOSCOPY  Pt has fit test          GuzzMobile Activation: sent activation code  Gu[employer]Mobile Lori: no  Virtual Visits: no  Opt In to Text Messages: no

## 2017-10-13 ENCOUNTER — APPOINTMENT (OUTPATIENT)
Dept: NEUROLOGY | Facility: MEDICAL CENTER | Age: 62
End: 2017-10-13
Payer: MEDICARE

## 2017-10-23 ENCOUNTER — OFFICE VISIT (OUTPATIENT)
Dept: MEDICAL GROUP | Facility: MEDICAL CENTER | Age: 62
End: 2017-10-23
Payer: MEDICARE

## 2017-10-23 VITALS
BODY MASS INDEX: 25.74 KG/M2 | TEMPERATURE: 96.4 F | SYSTOLIC BLOOD PRESSURE: 128 MMHG | RESPIRATION RATE: 14 BRPM | HEART RATE: 70 BPM | DIASTOLIC BLOOD PRESSURE: 78 MMHG | WEIGHT: 164 LBS | HEIGHT: 67 IN | OXYGEN SATURATION: 97 %

## 2017-10-23 DIAGNOSIS — E11.9 TYPE 2 DIABETES MELLITUS WITHOUT COMPLICATION, WITHOUT LONG-TERM CURRENT USE OF INSULIN (HCC): ICD-10-CM

## 2017-10-23 DIAGNOSIS — I10 ESSENTIAL HYPERTENSION: ICD-10-CM

## 2017-10-23 DIAGNOSIS — J01.10 ACUTE NON-RECURRENT FRONTAL SINUSITIS: ICD-10-CM

## 2017-10-23 DIAGNOSIS — E78.5 DYSLIPIDEMIA: ICD-10-CM

## 2017-10-23 DIAGNOSIS — E05.00 GRAVES' DISEASE: ICD-10-CM

## 2017-10-23 DIAGNOSIS — Z72.0 TOBACCO USE: ICD-10-CM

## 2017-10-23 PROBLEM — E11.65 TYPE 2 DIABETES MELLITUS WITH HYPERGLYCEMIA, WITHOUT LONG-TERM CURRENT USE OF INSULIN (HCC): Status: RESOLVED | Noted: 2017-04-21 | Resolved: 2017-10-23

## 2017-10-23 LAB
HBA1C MFR BLD: 7.3 % (ref ?–5.8)
INT CON NEG: NEGATIVE
INT CON POS: POSITIVE

## 2017-10-23 PROCEDURE — 99214 OFFICE O/P EST MOD 30 MIN: CPT | Performed by: FAMILY MEDICINE

## 2017-10-23 PROCEDURE — 83036 HEMOGLOBIN GLYCOSYLATED A1C: CPT | Performed by: FAMILY MEDICINE

## 2017-10-23 RX ORDER — AMOXICILLIN AND CLAVULANATE POTASSIUM 875; 125 MG/1; MG/1
1 TABLET, FILM COATED ORAL 2 TIMES DAILY
Qty: 14 TAB | Refills: 0 | Status: SHIPPED | OUTPATIENT
Start: 2017-10-23 | End: 2017-10-30

## 2017-10-23 NOTE — PROGRESS NOTES
cc:  diabetes    Subjective:     Gia GUEVARA is a 62 y.o. female presenting for a follow up:    1. Diabetes, HTN, HLD:    --asa: takes 81mg daily, no stomach or stool issues  --a1c:  was 7.0 on 7/2017, today is 7.3.  BGs at homeRange in the 160s to 170s.  Is currently taking metformin 500mg once a day, occasionally will take it twice a day. Has improved her diet, is also swimming daily.  --BP: normal today.  Has been normotensive. Denies any chest pain, sob, leg swelling.  Is currently on metoprolol 50mg daily.  Last BMP was 1/2017  --LDL: last checked 1/2017.  Is on zocor 20 mg daily.  No myalgias  --tobacco: smoking, has been cutting down, is down to 3 cig/day.  Wants to quit eventually.    --eye exam: last exam 12/2016.  No vision changes.  --foot exam: last exam 7/2017.  Denies any foot, skin problems.  No numbness/tingling.  --microalbumin: last checked 4/2017.  No urinary symptoms.    2. Sinus issues: Has been having nasal congestion and chest congestion for the past 3 weeks, seems to be somewhat better but not resolved yet. Associated with tooth pain, nasal drainage, cough. Denies any nausea, vomiting, headaches, shortness of breath, chest pain, abdominal pain, diarrhea, rash, sick contacts, recent travel. Tried 4 doses of a friend's antibiotic, seemed to improve somewhat, she does not number the name of the antibiotic.    Review of systems:  See above.        Current Outpatient Prescriptions:   •  metformin (GLUCOPHAGE) 500 MG Tab, Take 1 Tab by mouth 2 times a day, with meals., Disp: , Rfl:   •  amoxicillin-clavulanate (AUGMENTIN) 875-125 MG Tab, Take 1 Tab by mouth 2 times a day for 7 days., Disp: 14 Tab, Rfl: 0  •  Lancets Misc, Use 1x/day and PRN for signs and symptoms of high or low blood sugar. Dx E 11.65, Disp: 100 Each, Rfl: 3  •  Blood Glucose Monitoring Suppl Kit, Test blood sugar as recommended by provider.  Dx E 11.65, Disp: 1 Kit, Rfl: PRN  •  Blood Glucose Monitoring Suppl SUPPLIES  Misc, For test strips: Use 1x/day and PRN for signs and symptoms of high or low blood sugar. Dx E 11.65, Disp: 100 Each, Rfl: 3  •  simvastatin (ZOCOR) 20 MG Tab, Take 1 Tab by mouth every evening. Indications: high cholesterol, Disp: 90 Tab, Rfl: 3  •  phenytoin ER (DILANTIN) 100 MG Cap, Take 1 Cap by mouth every day., Disp: 1 Cap, Rfl: 0  •  albuterol (PROVENTIL HFA) 108 (90 BASE) MCG/ACT Aero Soln inhalation aerosol, Inhale 2 Puffs by mouth every four hours as needed for Shortness of Breath., Disp: 1 Inhaler, Rfl: 3  •  nitroglycerin (NITROSTAT) 0.4 MG SL Tab, Place 1 Tab under tongue as needed for Chest Pain., Disp: 25 Tab, Rfl: 1  •  propylthiouracil (PTU) 50 MG Tab, Take 2 Tabs by mouth 2 times a day., Disp: 120 Tab, Rfl: 3  •  metoprolol (LOPRESSOR) 50 MG Tab, Take 1 Tab by mouth 2 times a day., Disp: 180 Tab, Rfl: 1  •  ASPIRIN 81 MG PO TBEC, Take  by mouth. qd , Disp: , Rfl:     No Known Allergies    Past Medical History:   Diagnosis Date   • Anxiety    • Arrhythmia    • Chronic headache    • Depression    • Grave's disease    • Meningitis 1948   • Seizures (CMS-HCC)    • Urinary tract infection, site not specified      Past Surgical History:   Procedure Laterality Date   • TUBAL LIGATION       Family History   Problem Relation Age of Onset   • Diabetes Mother    • Heart Disease Mother      MI   • Hypertension Mother    • Stroke Father    • Hypertension Father    • Diabetes Sister      Social History     Social History   • Marital status: Single     Spouse name: N/A   • Number of children: N/A   • Years of education: N/A     Occupational History   • Not on file.     Social History Main Topics   • Smoking status: Current Every Day Smoker     Packs/day: 0.25     Years: 22.00     Types: Cigarettes   • Smokeless tobacco: Never Used      Comment: 1-2 cigs/day   • Alcohol use No   • Drug use: No   • Sexual activity: Not Currently     Other Topics Concern   • Not on file     Social History Narrative   • No  "narrative on file       Objective:     Vitals: /78   Pulse 70   Temp (!) 35.8 °C (96.4 °F)   Resp 14   Ht 1.702 m (5' 7\")   Wt 74.4 kg (164 lb)   SpO2 97%   BMI 25.69 kg/m²   General: Alert, pleasant, NAD  HEENT: Normocephalic.  PERRL, EOMI, no icterus or pallor.  Conjunctivae and lids normal. External ears normal. Tympanic membranes pearly, opaque.  Nares patent, mucosa pink, drainage present.  Oropharynx non-erythematous, mucous membranes moist.  Neck supple.  No thyromegaly or masses palpated. No cervical or supraclavicular lymphadenopathy.  Heart: Regular rate and rhythm.  S1 and S2 normal.  No murmurs appreciated.  Respiratory: Normal respiratory effort.  Clear to auscultation bilaterally.  Abdomen: Non-distended, soft  Skin: Warm, dry, no rashes.  Musculoskeletal: Gait is normal.  Moves all extremities well.  Extremities: No leg edema.    Psych:  Affect/mood is normal, judgement is good, memory is intact, grooming is appropriate.    Assessment/Plan:     Gia was seen today for diabetes mellitus.    Diagnoses and all orders for this visit:    Type 2 diabetes mellitus without complication, without long-term current use of insulin (CMS-[facility])  Recommended that she continue the metformin twice a day. Follow-up in 3 months  -     POCT Hemoglobin A1C  -     BASIC METABOLIC PANEL; Future    Graves' disease  Due for labs, patient declines going to specialist.  -     TSH; Future  -     TRIIDOTHYRONINE; Future  -     FREE THYROXINE; Future  -     BASIC METABOLIC PANEL; Future    Essential hypertension  Stable, continue current medications    Dyslipidemia  Stable, continue statin  -     LIPID PROFILE; Future    Acute non-recurrent frontal sinusitis  Will start treatment with Augmentin. Follow up as needed  -     amoxicillin-clavulanate (AUGMENTIN) 875-125 MG Tab; Take 1 Tab by mouth 2 times a day for 7 days.    Tobacco use  Advised to quit, patient is working on this        Return in about 3 months (around " 1/23/2018) for Diabetes with RN appt.

## 2017-12-11 ENCOUNTER — TELEPHONE (OUTPATIENT)
Dept: MEDICAL GROUP | Facility: MEDICAL CENTER | Age: 62
End: 2017-12-11

## 2017-12-11 RX ORDER — DOXYCYCLINE HYCLATE 100 MG
100 TABLET ORAL 2 TIMES DAILY
Qty: 20 TAB | Refills: 0 | Status: SHIPPED | OUTPATIENT
Start: 2017-12-11 | End: 2018-05-24 | Stop reason: SDUPTHER

## 2017-12-11 NOTE — TELEPHONE ENCOUNTER
1. Caller Name: Gia                                         Call Back Number: [phone] (home)         Patient approves a detailed voicemail message: yes    patient called wanting to know if you can order stronger antibiotic due to her feeling worse than before she has also some very strong headaches.wich she said she might have to go to ER

## 2018-01-24 ENCOUNTER — HOSPITAL ENCOUNTER (OUTPATIENT)
Dept: LAB | Facility: MEDICAL CENTER | Age: 63
End: 2018-01-24
Attending: FAMILY MEDICINE
Payer: MEDICARE

## 2018-01-24 DIAGNOSIS — E78.5 DYSLIPIDEMIA: ICD-10-CM

## 2018-01-24 DIAGNOSIS — E05.00 GRAVES' DISEASE: ICD-10-CM

## 2018-01-24 DIAGNOSIS — E11.9 TYPE 2 DIABETES MELLITUS WITHOUT COMPLICATION, WITHOUT LONG-TERM CURRENT USE OF INSULIN (HCC): ICD-10-CM

## 2018-01-24 LAB
T3 SERPL-MCNC: 112.4 NG/DL (ref 60–181)
T4 FREE SERPL-MCNC: 0.75 NG/DL (ref 0.53–1.43)
TSH SERPL DL<=0.005 MIU/L-ACNC: 0.41 UIU/ML (ref 0.38–5.33)

## 2018-01-24 PROCEDURE — 80061 LIPID PANEL: CPT

## 2018-01-24 PROCEDURE — 84480 ASSAY TRIIODOTHYRONINE (T3): CPT

## 2018-01-24 PROCEDURE — 84439 ASSAY OF FREE THYROXINE: CPT

## 2018-01-24 PROCEDURE — 80048 BASIC METABOLIC PNL TOTAL CA: CPT

## 2018-01-24 PROCEDURE — 84443 ASSAY THYROID STIM HORMONE: CPT

## 2018-01-24 PROCEDURE — 36415 COLL VENOUS BLD VENIPUNCTURE: CPT

## 2018-01-25 ENCOUNTER — OFFICE VISIT (OUTPATIENT)
Dept: MEDICAL GROUP | Facility: MEDICAL CENTER | Age: 63
End: 2018-01-25
Payer: MEDICARE

## 2018-01-25 VITALS
SYSTOLIC BLOOD PRESSURE: 130 MMHG | WEIGHT: 151 LBS | HEIGHT: 67 IN | BODY MASS INDEX: 23.7 KG/M2 | DIASTOLIC BLOOD PRESSURE: 80 MMHG | OXYGEN SATURATION: 96 % | TEMPERATURE: 96.3 F | HEART RATE: 85 BPM

## 2018-01-25 DIAGNOSIS — E78.5 DYSLIPIDEMIA: ICD-10-CM

## 2018-01-25 DIAGNOSIS — E05.00 GRAVES' DISEASE: ICD-10-CM

## 2018-01-25 DIAGNOSIS — I10 ESSENTIAL HYPERTENSION: ICD-10-CM

## 2018-01-25 DIAGNOSIS — R63.4 WEIGHT LOSS: ICD-10-CM

## 2018-01-25 DIAGNOSIS — E11.9 TYPE 2 DIABETES MELLITUS WITHOUT COMPLICATION, WITHOUT LONG-TERM CURRENT USE OF INSULIN (HCC): ICD-10-CM

## 2018-01-25 LAB
ANION GAP SERPL CALC-SCNC: 8 MMOL/L (ref 0–11.9)
BUN SERPL-MCNC: 8 MG/DL (ref 8–22)
CALCIUM SERPL-MCNC: 9.1 MG/DL (ref 8.4–10.2)
CHLORIDE SERPL-SCNC: 107 MMOL/L (ref 96–112)
CHOLEST SERPL-MCNC: 210 MG/DL (ref 100–199)
CO2 SERPL-SCNC: 24 MMOL/L (ref 20–33)
CREAT SERPL-MCNC: 0.91 MG/DL (ref 0.5–1.4)
GLUCOSE SERPL-MCNC: 111 MG/DL (ref 65–99)
HBA1C MFR BLD: 7.4 % (ref ?–5.8)
HDLC SERPL-MCNC: 59 MG/DL
INT CON NEG: NORMAL
INT CON POS: NORMAL
LDLC SERPL CALC-MCNC: 140 MG/DL
POTASSIUM SERPL-SCNC: 3.8 MMOL/L (ref 3.6–5.5)
SODIUM SERPL-SCNC: 139 MMOL/L (ref 135–145)
TRIGL SERPL-MCNC: 56 MG/DL (ref 0–149)

## 2018-01-25 PROCEDURE — 83036 HEMOGLOBIN GLYCOSYLATED A1C: CPT | Performed by: FAMILY MEDICINE

## 2018-01-25 PROCEDURE — 99215 OFFICE O/P EST HI 40 MIN: CPT | Performed by: FAMILY MEDICINE

## 2018-01-25 NOTE — PROGRESS NOTES
cc:  diabetes    Subjective:     Gia GUEVARA is a 62 y.o. female presenting for:      Diabetes, HTN, HLD:    --asa: takes 81mg daily, no stomach or stool issues  --a1c:  Today is 7.4, was 7.3 10/2017.  Is currently taking metformin 500mg once a day, occasionally will take it twice a day. Diet is well rounded, is also swimming daily.  However, she has been losing a significant amount of weight.  Has not established with endocrine for her graves disease yet.  --BP: normal today.  Has been normotensive. Denies any chest pain, sob, leg swelling.  Is currently on metoprolol 50mg daily.  Last BMP was 1/2017  --LDL: last checked 1/2017.  Is on zocor 20 mg daily.  No myalgias  --tobacco: smoking, about 5 cig/day.  Wants to quit eventually.    --eye exam: last exam 12/2016.  No vision changes.  --foot exam: last exam 7/2017.  Denies any foot, skin problems.  No numbness/tingling.  --microalbumin: last checked 4/2017.  No urinary symptoms.      Review of systems:  See above.       Current Outpatient Prescriptions:   •  metformin (GLUCOPHAGE) 500 MG Tab, Take 1 Tab by mouth 2 times a day, with meals., Disp: , Rfl:   •  Lancets Misc, Use 1x/day and PRN for signs and symptoms of high or low blood sugar. Dx E 11.65, Disp: 100 Each, Rfl: 3  •  Blood Glucose Monitoring Suppl SUPPLIES Misc, For test strips: Use 1x/day and PRN for signs and symptoms of high or low blood sugar. Dx E 11.65, Disp: 100 Each, Rfl: 3  •  simvastatin (ZOCOR) 20 MG Tab, Take 1 Tab by mouth every evening. Indications: high cholesterol, Disp: 90 Tab, Rfl: 3  •  phenytoin ER (DILANTIN) 100 MG Cap, Take 1 Cap by mouth every day., Disp: 1 Cap, Rfl: 0  •  albuterol (PROVENTIL HFA) 108 (90 BASE) MCG/ACT Aero Soln inhalation aerosol, Inhale 2 Puffs by mouth every four hours as needed for Shortness of Breath., Disp: 1 Inhaler, Rfl: 3  •  propylthiouracil (PTU) 50 MG Tab, Take 2 Tabs by mouth 2 times a day., Disp: 120 Tab, Rfl: 3  •  metoprolol (LOPRESSOR)  "50 MG Tab, Take 1 Tab by mouth 2 times a day., Disp: 180 Tab, Rfl: 1  •  ASPIRIN 81 MG PO TBEC, Take  by mouth. qd , Disp: , Rfl:   •  Blood Glucose Monitoring Suppl Kit, Test blood sugar as recommended by provider.  Dx E 11.65, Disp: 1 Kit, Rfl: PRN  •  nitroglycerin (NITROSTAT) 0.4 MG SL Tab, Place 1 Tab under tongue as needed for Chest Pain., Disp: 25 Tab, Rfl: 1    No Known Allergies    Past Medical History:   Diagnosis Date   • Anxiety    • Arrhythmia    • Chronic headache    • Depression    • Grave's disease    • Meningitis 1950   • Seizures (CMS-HCC)    • Urinary tract infection, site not specified      Past Surgical History:   Procedure Laterality Date   • TUBAL LIGATION       Family History   Problem Relation Age of Onset   • Diabetes Mother    • Heart Disease Mother      MI   • Hypertension Mother    • Stroke Father    • Hypertension Father    • Diabetes Sister      Social History     Social History   • Marital status: Single     Spouse name: N/A   • Number of children: N/A   • Years of education: N/A     Occupational History   • Not on file.     Social History Main Topics   • Smoking status: Current Every Day Smoker     Packs/day: 0.25     Years: 22.00     Types: Cigarettes   • Smokeless tobacco: Never Used      Comment: 1-2 cigs/day   • Alcohol use No   • Drug use: No   • Sexual activity: Not Currently     Other Topics Concern   • Not on file     Social History Narrative   • No narrative on file       Objective:     Vitals: /80   Pulse 85   Temp (!) 35.7 °C (96.3 °F)   Ht 1.702 m (5' 7\")   Wt 68.5 kg (151 lb)   SpO2 96%   BMI 23.65 kg/m²   General: Alert, pleasant, NAD  HEENT: Normocephalic.    Psych:  Affect/mood is normal, judgement is good, memory is intact, grooming is appropriate.    Assessment/Plan:     Gia was seen today for diabetes.    Diagnoses and all orders for this visit:    Type 2 diabetes mellitus without complication, without long-term current use of insulin (CMS-HCC)  We " discussed increasing her metformin to 1000 twice a day given the somewhat elevated A1c. Patient will like to hold off on this for now. We'll recheck an A1c in 3 months. Reminded patient that she is due for an eye exam, she declined to have a retinal screening exam done today.   -     Diabetic Monofilament LE Exam  -     POCT Hemoglobin A1C    Graves' disease  Weight loss  Recommended the patient establish with endocrinology. She will think about this.  -     REFERRAL TO ENDOCRINOLOGY    Essential hypertension  Stable, due for labs. Continue current med  -     COMP METABOLIC PANEL; Future    Dyslipidemia  Stable, due for labs, continue current med  -     LIPID PROFILE; Future      Patient was seen for a total of 40 minutes face-to-face, with more than half of the time spent counseling or coordinating care regarding the above mentioned problems.       Return in about 3 months (around 4/25/2018) for Diabetes.

## 2018-01-25 NOTE — PROGRESS NOTES
RN-BHAVANI Note  Type 2 Diabets  Subjective:     Health changes since last visit/interval Hx: States she is losing her lower teeth and feeling like she is having more symptoms associated with Graves disease.  Losing weight, feeling weak, and heart racing.    Medications (including changes made today)  Current Outpatient Prescriptions   Medication Sig Dispense Refill   • metformin (GLUCOPHAGE) 500 MG Tab Take 1 Tab by mouth 2 times a day, with meals.     • Lancets Misc Use 1x/day and PRN for signs and symptoms of high or low blood sugar. Dx E 11.65 100 Each 3   • Blood Glucose Monitoring Suppl SUPPLIES Misc For test strips: Use 1x/day and PRN for signs and symptoms of high or low blood sugar. Dx E 11.65 100 Each 3   • simvastatin (ZOCOR) 20 MG Tab Take 1 Tab by mouth every evening. Indications: high cholesterol 90 Tab 3   • phenytoin ER (DILANTIN) 100 MG Cap Take 1 Cap by mouth every day. 1 Cap 0   • albuterol (PROVENTIL HFA) 108 (90 BASE) MCG/ACT Aero Soln inhalation aerosol Inhale 2 Puffs by mouth every four hours as needed for Shortness of Breath. 1 Inhaler 3   • propylthiouracil (PTU) 50 MG Tab Take 2 Tabs by mouth 2 times a day. 120 Tab 3   • metoprolol (LOPRESSOR) 50 MG Tab Take 1 Tab by mouth 2 times a day. 180 Tab 1   • ASPIRIN 81 MG PO TBEC Take  by mouth. qd      • Blood Glucose Monitoring Suppl Kit Test blood sugar as recommended by provider.  Dx E 11.65 1 Kit PRN   • nitroglycerin (NITROSTAT) 0.4 MG SL Tab Place 1 Tab under tongue as needed for Chest Pain. 25 Tab 1     No current facility-administered medications for this visit.          Taking daily ASA: Yes  Taking above medications as prescribed: Yes   Patient Denies side effects of medication.    Exercise: States she usually walks and swims but has not done much with the cold weather.  Diet: Breakfast is egg, sausage, or oatmeal.  Lunch is sandwich.  Dinner is T.V. Dinner.  Drinking water and regular Danny-Aid    Health Maintenance:   Health Maintenance  Topics with due status: Overdue       Topic Date Due    COLONOSCOPY 08/28/2015    RETINAL SCREENING 12/19/2017    FASTING LIPID PROFILE 01/24/2018    SERUM CREATININE 01/24/2018         DM:   Last A1c:   Lab Results   Component Value Date/Time    HBA1C 7.4 01/25/2018 09:29 AM      A1c goal: < 7    Glucose monitoring frequency: Testing twice daily.  trend: 130-160's.  Hypoglycemic episodes: no     Last Retinal Exam: 12/19/16 Provider: Office  Daily Foot Exam: yes  Routine Dental Exams: yes, working on getting insurance to get some partials.    Lab Results   Component Value Date/Time    MALBCRT 5 04/21/2017 01:32 PM    MICROALBUR 1.4 04/21/2017 01:32 PM        ACR Albumin/Creatinine Ratio goal <30     Diabetic complications: Graves Disease    HTN:   Blood pressure goal <140/<80 .   Currently Rx ACE/ARB: No    Dyslipidemia:    Lab Results   Component Value Date/Time    CHOLSTRLTOT 167 01/24/2017 07:25 AM    LDL 98 01/24/2017 07:25 AM    HDL 54 01/24/2017 07:25 AM    TRIGLYCERIDE 77 01/24/2017 07:25 AM       Lab Results   Component Value Date/Time    SODIUM 141 01/24/2017 07:25 AM    POTASSIUM 4.0 01/24/2017 07:25 AM    CHLORIDE 109 01/24/2017 07:25 AM    CO2 23 01/24/2017 07:25 AM    GLUCOSE 144 (H) 01/24/2017 07:25 AM    BUN 14 01/24/2017 07:25 AM    CREATININE 0.73 01/24/2017 07:25 AM    CREATININE 1.3 01/27/2009 04:34 PM     Lab Results   Component Value Date/Time    ALKPHOSPHAT 95 01/24/2017 07:25 AM    ASTSGOT 17 01/24/2017 07:25 AM    ALTSGPT 20 01/24/2017 07:25 AM    TBILIRUBIN 0.5 01/24/2017 07:25 AM        Currently Rx Statin: Yes      She  reports that she has been smoking Cigarettes.  She has a 5.50 pack-year smoking history. She has never used smokeless tobacco.    Objective:     Exam:  Monofilament: done  Monofilament testing with a 10 gram force: sensation intact: intact bilaterally  Visual Inspection: Feet without maceration, ulcers, fissures.  Pedal pulses: intact bilaterally      Plan:     - Diabetic  diet discussed in detail-plate method.  - Home glucose monitoring.  - She will test and log.    - She will walk for 20-30 minutes daily.  - Reviewed medications and advised to take metformin after meals to decrease   G.I.upset.   - Discussed importance of immunizations and yearly eye exams. Needs eye exam and does not get immunizations.  - Encouraged patient to attend diabetes education program.   -Educational material distributed.   - Advised daily foot exams. Educated on signs of infection.       Recommended medication changes: I recommend increasing her Metformin to 1000 mg BID.  She plans on getting more active and stop the regular Danny-Aid.

## 2018-02-06 DIAGNOSIS — E05.00 GRAVES' DISEASE: ICD-10-CM

## 2018-02-06 RX ORDER — PROPYLTHIOURACIL 50 MG/1
100 TABLET ORAL 2 TIMES DAILY
Qty: 120 TAB | Refills: 0 | Status: SHIPPED | OUTPATIENT
Start: 2018-02-06 | End: 2018-03-21

## 2018-02-20 ENCOUNTER — APPOINTMENT (OUTPATIENT)
Dept: ENDOCRINOLOGY | Facility: MEDICAL CENTER | Age: 63
End: 2018-02-20
Payer: MEDICARE

## 2018-03-21 ENCOUNTER — OFFICE VISIT (OUTPATIENT)
Dept: ENDOCRINOLOGY | Facility: MEDICAL CENTER | Age: 63
End: 2018-03-21
Payer: MEDICARE

## 2018-03-21 VITALS
BODY MASS INDEX: 23.86 KG/M2 | HEIGHT: 67 IN | WEIGHT: 152 LBS | SYSTOLIC BLOOD PRESSURE: 170 MMHG | DIASTOLIC BLOOD PRESSURE: 102 MMHG | OXYGEN SATURATION: 100 % | HEART RATE: 77 BPM

## 2018-03-21 DIAGNOSIS — I10 ESSENTIAL HYPERTENSION: ICD-10-CM

## 2018-03-21 DIAGNOSIS — E05.00 GRAVES DISEASE: ICD-10-CM

## 2018-03-21 DIAGNOSIS — E05.90 HYPERTHYROIDISM: ICD-10-CM

## 2018-03-21 PROCEDURE — 99204 OFFICE O/P NEW MOD 45 MIN: CPT | Performed by: INTERNAL MEDICINE

## 2018-03-21 RX ORDER — HYDROCHLOROTHIAZIDE 12.5 MG/1
12.5 TABLET ORAL DAILY
Qty: 30 TAB | Refills: 6 | Status: SHIPPED | OUTPATIENT
Start: 2018-03-21 | End: 2018-10-01 | Stop reason: SDUPTHER

## 2018-03-21 RX ORDER — METHIMAZOLE 5 MG/1
5 TABLET ORAL 3 TIMES DAILY
Qty: 30 TAB | Refills: 6 | Status: SHIPPED | OUTPATIENT
Start: 2018-03-21 | End: 2018-03-21 | Stop reason: SDUPTHER

## 2018-03-21 RX ORDER — METHIMAZOLE 5 MG/1
5 TABLET ORAL DAILY
Qty: 30 TAB | Refills: 6 | Status: SHIPPED | OUTPATIENT
Start: 2018-03-21 | End: 2018-07-31 | Stop reason: SDUPTHER

## 2018-03-21 NOTE — PROGRESS NOTES
New Patient Consult Note  Primary care physician: Kindra Gutierrez M.D.    Reason for consult: Hyperthyroidism    HPI:  Gia GUEVARA is a 62 y.o. old patient who comes in today for evaluation of hyperthyroidism. She was initially diagnosed with hyperthyroidism due to Graves' disease about 10 years ago. She was started on PTU at that time. She initially took PTU for about 2 years, she then went into remission, and had a relapse 2 years later. She has been on PTU for the past 6 years now. She is currently on  mg twice a day. She reports compliance with medications. She denies noticing fever or jaundice. She has family history of Graves' disease. No recent weight loss. No palpitations or racing heart. She complains of excessive appetite. She denies pain or pressure behind the eyes, no double vision.    ROS:  Constitutional: No unintentional weight loss  Cardiac: No palpitations or racing heart  Resp: No shortness of breath  All other systems were reviewed and were negative.    Past Medical History:  Patient Active Problem List    Diagnosis Date Noted   • Type 2 diabetes mellitus without complication, without long-term current use of insulin (CMS-HCC) 10/23/2017   • Tobacco use 04/21/2017   • Chronic post-traumatic headache, not intractable 03/23/2017   • Dyslipidemia 09/30/2014   • Graves' disease 01/09/2014   • Essential hypertension 04/15/2013   • Fibroid uterus 06/20/2011   • Seizures (CMS-HCC)    • Arrhythmia        Past Surgical History:  Past Surgical History:   Procedure Laterality Date   • TUBAL LIGATION         Allergies:  Patient has no known allergies.    Social History:  Social History     Social History   • Marital status: Single     Spouse name: N/A   • Number of children: N/A   • Years of education: N/A     Occupational History   • Not on file.     Social History Main Topics   • Smoking status: Current Every Day Smoker     Packs/day: 0.25     Years: 22.00     Types: Cigarettes   •  Smokeless tobacco: Never Used      Comment: 1-2 cigs/day   • Alcohol use No   • Drug use: No   • Sexual activity: Not Currently     Other Topics Concern   • Not on file     Social History Narrative   • No narrative on file       Family History:  Family History   Problem Relation Age of Onset   • Diabetes Mother    • Heart Disease Mother      MI   • Hypertension Mother    • Stroke Father    • Hypertension Father    • Diabetes Sister        Medications:    Current Outpatient Prescriptions:   •  hydroCHLOROthiazide (HYDRODIURIL) 12.5 MG tablet, Take 1 Tab by mouth every day., Disp: 30 Tab, Rfl: 6  •  methimazole (TAPAZOLE) 5 MG Tab, Take 1 Tab by mouth 3 times a day., Disp: 30 Tab, Rfl: 6  •  metformin (GLUCOPHAGE) 500 MG Tab, Take 1 Tab by mouth 2 times a day, with meals., Disp: , Rfl:   •  Lancets Misc, Use 1x/day and PRN for signs and symptoms of high or low blood sugar. Dx E 11.65, Disp: 100 Each, Rfl: 3  •  Blood Glucose Monitoring Suppl Kit, Test blood sugar as recommended by provider.  Dx E 11.65, Disp: 1 Kit, Rfl: PRN  •  Blood Glucose Monitoring Suppl SUPPLIES Misc, For test strips: Use 1x/day and PRN for signs and symptoms of high or low blood sugar. Dx E 11.65, Disp: 100 Each, Rfl: 3  •  simvastatin (ZOCOR) 20 MG Tab, Take 1 Tab by mouth every evening. Indications: high cholesterol, Disp: 90 Tab, Rfl: 3  •  phenytoin ER (DILANTIN) 100 MG Cap, Take 1 Cap by mouth every day., Disp: 1 Cap, Rfl: 0  •  albuterol (PROVENTIL HFA) 108 (90 BASE) MCG/ACT Aero Soln inhalation aerosol, Inhale 2 Puffs by mouth every four hours as needed for Shortness of Breath., Disp: 1 Inhaler, Rfl: 3  •  nitroglycerin (NITROSTAT) 0.4 MG SL Tab, Place 1 Tab under tongue as needed for Chest Pain., Disp: 25 Tab, Rfl: 1  •  metoprolol (LOPRESSOR) 50 MG Tab, Take 1 Tab by mouth 2 times a day., Disp: 180 Tab, Rfl: 1  •  ASPIRIN 81 MG PO TBEC, Take  by mouth. qd , Disp: , Rfl:     Physical Examination:  Vital signs: BP (!) 170/102   Pulse 77  "  Ht 1.702 m (5' 7\")   Wt 68.9 kg (152 lb)   SpO2 100%   BMI 23.81 kg/m²   General: No apparent distress, cooperative  Eyes: No scleral icterus or discharge  ENMT: Normal on external inspection of nose, lips  Neck: No abnormal masses on inspection  Resp: Normal effort, clear to auscultation bilaterally   CVS: Regular rate and rhythm, S1 S2 normal, no murmur   Extremities: No edema  Neuro: Alert and oriented  Skin: No rash  Psych: Normal mood and affect    Assessment and Plan:    1. Hyperthyroidism  · We discussed that PTU has a higher risk for hepatotoxicity, and that better choice would be methimazole, she has never tried methimazole in the past  · Discontinued PTU  · Started methimazole  · We also discussed about radioactive iodine ablation, she is interested in getting MCKENZIE for long-term treatment of hyperthyroidism  · Advised to repeat labs for TSH and free T4 every 2 weeks for now  - methimazole (TAPAZOLE) 5 MG Tab; Take 1 Tab by mouth daily.  Dispense: 30 Tab; Refill: 6  - TSH; Future  - FREE THYROXINE; Future  - TSH; Future  - FREE THYROXINE; Future  - TSH; Future  - FREE THYROXINE; Future    2. Graves disease  · Denies pain or pressure behind the eyes, no double vision  - methimazole (TAPAZOLE) 5 MG Tab; Take 1 Tab by mouth 3 times a day.  Dispense: 30 Tab; Refill: 6    3. Essential hypertension  · Her blood pressure is very high in the clinic today, she reports compliance with medications  · Continue metoprolol  · Started hydrochlorothiazide   · Advised to repeat labs for BMP in one week  - hydroCHLOROthiazide (HYDRODIURIL) 12.5 MG tablet; Take 1 Tab by mouth every day.  Dispense: 30 Tab; Refill: 6  - BASIC METABOLIC PANEL; Future    Return in about 2 months (around 5/21/2018).    Thank you for allowing me to participate in the care of this patient.    Elvin Monterroso M.D.  03/21/18    CC:   Kindra Gutierrez M.D.    This note was created using voice recognition software (Dragon). The accuracy of the " dictation is limited by the abilities of the software. I have reviewed the note prior to signing, however some errors in grammar and context are still possible. If you have any questions related to this note please do not hesitate to contact our office.

## 2018-03-29 ENCOUNTER — APPOINTMENT (OUTPATIENT)
Dept: LAB | Facility: MEDICAL CENTER | Age: 63
End: 2018-03-29
Payer: MEDICARE

## 2018-03-30 ENCOUNTER — HOSPITAL ENCOUNTER (OUTPATIENT)
Dept: LAB | Facility: MEDICAL CENTER | Age: 63
End: 2018-03-30
Attending: INTERNAL MEDICINE
Payer: MEDICARE

## 2018-03-30 DIAGNOSIS — I10 ESSENTIAL HYPERTENSION: ICD-10-CM

## 2018-03-30 LAB
ANION GAP SERPL CALC-SCNC: 10 MMOL/L (ref 0–11.9)
BUN SERPL-MCNC: 16 MG/DL (ref 8–22)
CALCIUM SERPL-MCNC: 9.8 MG/DL (ref 8.5–10.5)
CHLORIDE SERPL-SCNC: 100 MMOL/L (ref 96–112)
CO2 SERPL-SCNC: 27 MMOL/L (ref 20–33)
CREAT SERPL-MCNC: 0.86 MG/DL (ref 0.5–1.4)
GLUCOSE SERPL-MCNC: 134 MG/DL (ref 65–99)
POTASSIUM SERPL-SCNC: 3.3 MMOL/L (ref 3.6–5.5)
SODIUM SERPL-SCNC: 137 MMOL/L (ref 135–145)

## 2018-03-30 PROCEDURE — 80048 BASIC METABOLIC PNL TOTAL CA: CPT

## 2018-03-30 PROCEDURE — 36415 COLL VENOUS BLD VENIPUNCTURE: CPT

## 2018-04-02 ENCOUNTER — TELEPHONE (OUTPATIENT)
Dept: ENDOCRINOLOGY | Facility: MEDICAL CENTER | Age: 63
End: 2018-04-02

## 2018-04-02 DIAGNOSIS — E87.6 HYPOKALEMIA: ICD-10-CM

## 2018-04-02 RX ORDER — POTASSIUM CHLORIDE 20 MEQ/1
20 TABLET, EXTENDED RELEASE ORAL 2 TIMES DAILY
Qty: 60 TAB | Refills: 6 | Status: SHIPPED
Start: 2018-04-02 | End: 2020-03-13

## 2018-04-02 NOTE — TELEPHONE ENCOUNTER
----- Message from Elvin Monterroso M.D. sent at 4/2/2018  9:53 AM PDT -----  Please inform patient that potassium level is slightly low, hydrochlorothiazide can cause low potassium. I'm sending a new prescription for potassium supplement 20 mEq one tablet twice a day. I would recommend to repeat basic metabolic panel lab in one week.

## 2018-04-09 ENCOUNTER — HOSPITAL ENCOUNTER (OUTPATIENT)
Dept: LAB | Facility: MEDICAL CENTER | Age: 63
End: 2018-04-09
Attending: INTERNAL MEDICINE
Payer: MEDICARE

## 2018-04-09 DIAGNOSIS — E05.90 HYPERTHYROIDISM: ICD-10-CM

## 2018-04-09 LAB
T4 FREE SERPL-MCNC: 0.69 NG/DL (ref 0.53–1.43)
TSH SERPL DL<=0.005 MIU/L-ACNC: 1.64 UIU/ML (ref 0.38–5.33)

## 2018-04-09 PROCEDURE — 84443 ASSAY THYROID STIM HORMONE: CPT

## 2018-04-09 PROCEDURE — 84439 ASSAY OF FREE THYROXINE: CPT

## 2018-04-09 PROCEDURE — 36415 COLL VENOUS BLD VENIPUNCTURE: CPT

## 2018-04-12 ENCOUNTER — TELEPHONE (OUTPATIENT)
Dept: ENDOCRINOLOGY | Facility: MEDICAL CENTER | Age: 63
End: 2018-04-12

## 2018-04-12 NOTE — TELEPHONE ENCOUNTER
----- Message from Elvin Monterroso M.D. sent at 4/10/2018  1:44 PM PDT -----  Please inform patient that thyroid hormone levels are normal. Continue methimazole 5 mg daily.

## 2018-04-23 ENCOUNTER — HOSPITAL ENCOUNTER (OUTPATIENT)
Dept: LAB | Facility: MEDICAL CENTER | Age: 63
End: 2018-04-23
Attending: INTERNAL MEDICINE
Payer: MEDICARE

## 2018-04-23 DIAGNOSIS — E05.90 HYPERTHYROIDISM: ICD-10-CM

## 2018-04-23 LAB
T4 FREE SERPL-MCNC: 0.81 NG/DL (ref 0.53–1.43)
TSH SERPL DL<=0.005 MIU/L-ACNC: 0.95 UIU/ML (ref 0.38–5.33)

## 2018-04-23 PROCEDURE — 36415 COLL VENOUS BLD VENIPUNCTURE: CPT

## 2018-04-23 PROCEDURE — 84443 ASSAY THYROID STIM HORMONE: CPT

## 2018-04-23 PROCEDURE — 84439 ASSAY OF FREE THYROXINE: CPT

## 2018-04-24 ENCOUNTER — TELEPHONE (OUTPATIENT)
Dept: ENDOCRINOLOGY | Facility: MEDICAL CENTER | Age: 63
End: 2018-04-24

## 2018-04-24 NOTE — TELEPHONE ENCOUNTER
----- Message from Elvin Monterroso M.D. sent at 4/23/2018  1:25 PM PDT -----  Please inform patient that thyroid hormone levels are normal, no changes to medications.

## 2018-04-26 ENCOUNTER — OFFICE VISIT (OUTPATIENT)
Dept: MEDICAL GROUP | Facility: MEDICAL CENTER | Age: 63
End: 2018-04-26
Payer: MEDICARE

## 2018-04-26 ENCOUNTER — HOSPITAL ENCOUNTER (OUTPATIENT)
Facility: MEDICAL CENTER | Age: 63
End: 2018-04-26
Attending: FAMILY MEDICINE
Payer: MEDICARE

## 2018-04-26 VITALS
BODY MASS INDEX: 24.14 KG/M2 | OXYGEN SATURATION: 98 % | HEART RATE: 88 BPM | WEIGHT: 153.8 LBS | DIASTOLIC BLOOD PRESSURE: 70 MMHG | HEIGHT: 67 IN | SYSTOLIC BLOOD PRESSURE: 120 MMHG | TEMPERATURE: 98.6 F

## 2018-04-26 DIAGNOSIS — R56.9 SEIZURES (HCC): ICD-10-CM

## 2018-04-26 DIAGNOSIS — I10 ESSENTIAL HYPERTENSION: ICD-10-CM

## 2018-04-26 DIAGNOSIS — Z12.31 ENCOUNTER FOR SCREENING MAMMOGRAM FOR MALIGNANT NEOPLASM OF BREAST: ICD-10-CM

## 2018-04-26 DIAGNOSIS — E78.5 DYSLIPIDEMIA: ICD-10-CM

## 2018-04-26 DIAGNOSIS — E11.9 TYPE 2 DIABETES MELLITUS WITHOUT COMPLICATION, WITHOUT LONG-TERM CURRENT USE OF INSULIN (HCC): ICD-10-CM

## 2018-04-26 DIAGNOSIS — Z72.0 TOBACCO USE: ICD-10-CM

## 2018-04-26 LAB
HBA1C MFR BLD: 7.6 % (ref ?–5.8)
INT CON NEG: NORMAL
INT CON POS: NORMAL

## 2018-04-26 PROCEDURE — 82570 ASSAY OF URINE CREATININE: CPT

## 2018-04-26 PROCEDURE — 82043 UR ALBUMIN QUANTITATIVE: CPT

## 2018-04-26 PROCEDURE — 83036 HEMOGLOBIN GLYCOSYLATED A1C: CPT | Performed by: FAMILY MEDICINE

## 2018-04-26 PROCEDURE — 99215 OFFICE O/P EST HI 40 MIN: CPT | Performed by: FAMILY MEDICINE

## 2018-04-26 RX ORDER — METFORMIN HYDROCHLORIDE 500 MG/1
1000 TABLET, EXTENDED RELEASE ORAL 2 TIMES DAILY
COMMUNITY
Start: 2018-04-26 | End: 2018-07-31

## 2018-04-26 RX ORDER — METFORMIN HYDROCHLORIDE 500 MG/1
1000 TABLET, EXTENDED RELEASE ORAL 2 TIMES DAILY
Qty: 360 TAB | Refills: 3 | Status: SHIPPED | OUTPATIENT
Start: 2018-04-26 | End: 2018-04-26

## 2018-04-26 NOTE — PROGRESS NOTES
cc:  diabetes    Subjective:     Gia GUEVARA is a 62 y.o. female presenting Diabetes, HTN, HLD:    --asa: takes 81mg daily, no stomach or stool issues  --a1c:  Today is 7.6, was 7.4 1/2018.  Is currently taking metformin 500mg once a day, occasionally will take it twice a day.  Admits to eating a lot more carbs/bread lately.  The metformin does give her an upset stomach occasionally.   --BP: normal today.  Has been normotensive. Denies any chest pain, sob, leg swelling.  Is currently on hctz 12.5g daily and potassium.  Last BMP was 3/2018  --LDL: last checked 1/2017.  Is on zocor 20 mg daily.  No myalgias  --tobacco: smoking, has cut down to about 2  cig/day.  Wants to quit eventually.    --eye exam: last exam 12/2016.  No vision changes.  --foot exam: last exam 7/2017.  Denies any foot, skin problems.  No numbness/tingling.  --microalbumin: last checked 4/2017.  No urinary symptoms.      2. Seizures: has hx of seizures after a head injury. Was taking dilantin 100mg daily but stopped this on her own about 7-8 months ago. Last seizure was about 1-2 years ago    Review of systems:  See above.        Current Outpatient Prescriptions:   •  metFORMIN ER (GLUCOPHAGE XR) 500 MG TABLET SR 24 HR, Take 2 Tabs by mouth 2 times a day., Disp: , Rfl:   •  potassium chloride SA (KDUR) 20 MEQ Tab CR, Take 1 Tab by mouth 2 times a day., Disp: 60 Tab, Rfl: 6  •  hydroCHLOROthiazide (HYDRODIURIL) 12.5 MG tablet, Take 1 Tab by mouth every day., Disp: 30 Tab, Rfl: 6  •  methimazole (TAPAZOLE) 5 MG Tab, Take 1 Tab by mouth every day. (Patient taking differently: Take 5 mg by mouth 2 Times a Day.), Disp: 30 Tab, Rfl: 6  •  Lancets Misc, Use 1x/day and PRN for signs and symptoms of high or low blood sugar. Dx E 11.65, Disp: 100 Each, Rfl: 3  •  Blood Glucose Monitoring Suppl Kit, Test blood sugar as recommended by provider.  Dx E 11.65, Disp: 1 Kit, Rfl: PRN  •  Blood Glucose Monitoring Suppl SUPPLIES Misc, For test strips: Use  "1x/day and PRN for signs and symptoms of high or low blood sugar. Dx E 11.65, Disp: 100 Each, Rfl: 3  •  simvastatin (ZOCOR) 20 MG Tab, Take 1 Tab by mouth every evening. Indications: high cholesterol, Disp: 90 Tab, Rfl: 3  •  albuterol (PROVENTIL HFA) 108 (90 BASE) MCG/ACT Aero Soln inhalation aerosol, Inhale 2 Puffs by mouth every four hours as needed for Shortness of Breath., Disp: 1 Inhaler, Rfl: 3  •  nitroglycerin (NITROSTAT) 0.4 MG SL Tab, Place 1 Tab under tongue as needed for Chest Pain., Disp: 25 Tab, Rfl: 1  •  ASPIRIN 81 MG PO TBEC, Take  by mouth. qd , Disp: , Rfl:   •  phenytoin ER (DILANTIN) 100 MG Cap, Take 1 Cap by mouth every day., Disp: 1 Cap, Rfl: 0    No Known Allergies    Past Medical History:   Diagnosis Date   • Anxiety    • Arrhythmia    • Chronic headache    • Depression    • Grave's disease    • Meningitis 1950   • Seizures (CMS-[facility])    • Urinary tract infection, site not specified      Past Surgical History:   Procedure Laterality Date   • TUBAL LIGATION       Family History   Problem Relation Age of Onset   • Diabetes Mother    • Heart Disease Mother      MI   • Hypertension Mother    • Stroke Father    • Hypertension Father    • Diabetes Sister      Social History     Social History   • Marital status: Single     Spouse name: N/A   • Number of children: N/A   • Years of education: N/A     Occupational History   • Not on file.     Social History Main Topics   • Smoking status: Current Every Day Smoker     Packs/day: 0.25     Years: 22.00     Types: Cigarettes   • Smokeless tobacco: Never Used      Comment: 1-2 cigs/day   • Alcohol use No   • Drug use: No   • Sexual activity: Not Currently     Other Topics Concern   • Not on file     Social History Narrative   • No narrative on file       Objective:     Vitals: /70   Pulse 88   Temp 37 °C (98.6 °F)   Ht 1.702 m (5' 7\")   Wt 69.8 kg (153 lb 12.8 oz)   SpO2 98%   BMI 24.09 kg/m²   General: Alert, pleasant, NAD  HEENT: " Normocephalic.    Psych:  Affect/mood is normal, judgement is good, memory is intact, grooming is appropriate.    Assessment/Plan:     Gia was seen today for diabetes.    Diagnoses and all orders for this visit:    Type 2 diabetes mellitus without complication, without long-term current use of insulin (CMS-HCC)  Discussed increasing her metformin to 1000 mg twice a day we'll also switch to the extended release formulation to help with the GI side effect. She was planning to do the retinal exam today, but had to leave due to time.  Urine microalbumin collected today. Follow-up in 3 months.  -     Diabetic Monofilament LE Exam  -     POCT Hemoglobin A1C  -     metFORMIN ER (GLUCOPHAGE XR) 500 MG TABLET SR 24 HR; Take 2 Tabs by mouth 2 times a day.  -     Cancel: POCT Retinal Eye Exam  -     MICROALBUMIN CREAT RATIO URINE (CLINIC COLLECT); Future    Essential hypertension  Stable, continue current medications    Dyslipidemia  Stable, continue current medication    Encounter for screening mammogram for malignant neoplasm of breast  -     MA-MAMMO SCREENING BILAT W/CORI W/CAD; Future    Seizures (CMS-HCC)  Stable, off Dilantin. We'll continue to monitor    Tobacco use  Advised to quit, patient is working on this.      Patient was seen for a total of 40 minutes face-to-face by myself and the diabetes nurse, with more than half of the time spent counseling treatment, risks/benefits, and coordinating care for her diabetes, hypertension, tobacco use and the other above mentioned problems.         Return in about 3 months (around 7/26/2018) for Diabetes with RN appt (will also do pap at same time).

## 2018-04-26 NOTE — PROGRESS NOTES
RN-ILIRE Note    Subjective:     Health changes since last visit/interval Hx: General health good.  States having headaches every morning.    Medications (including changes made today)  Current Outpatient Prescriptions   Medication Sig Dispense Refill   • potassium chloride SA (KDUR) 20 MEQ Tab CR Take 1 Tab by mouth 2 times a day. 60 Tab 6   • metFORMIN (GLUCOPHAGE) 500 MG Tab Take 1 Tab by mouth 2 times a day, with meals. 180 Tab 1   • hydroCHLOROthiazide (HYDRODIURIL) 12.5 MG tablet Take 1 Tab by mouth every day. 30 Tab 6   • methimazole (TAPAZOLE) 5 MG Tab Take 1 Tab by mouth every day. (Patient taking differently: Take 5 mg by mouth 2 Times a Day.) 30 Tab 6   • Lancets Misc Use 1x/day and PRN for signs and symptoms of high or low blood sugar. Dx E 11.65 100 Each 3   • Blood Glucose Monitoring Suppl SUPPLIES Misc For test strips: Use 1x/day and PRN for signs and symptoms of high or low blood sugar. Dx E 11.65 100 Each 3   • simvastatin (ZOCOR) 20 MG Tab Take 1 Tab by mouth every evening. Indications: high cholesterol 90 Tab 3   • albuterol (PROVENTIL HFA) 108 (90 BASE) MCG/ACT Aero Soln inhalation aerosol Inhale 2 Puffs by mouth every four hours as needed for Shortness of Breath. 1 Inhaler 3   • ASPIRIN 81 MG PO TBEC Take  by mouth. qd      • Blood Glucose Monitoring Suppl Kit Test blood sugar as recommended by provider.  Dx E 11.65 1 Kit PRN   • phenytoin ER (DILANTIN) 100 MG Cap Take 1 Cap by mouth every day. 1 Cap 0   • nitroglycerin (NITROSTAT) 0.4 MG SL Tab Place 1 Tab under tongue as needed for Chest Pain. 25 Tab 1     No current facility-administered medications for this visit.          Taking daily ASA: Yes  Taking above medications as prescribed: Yes   Patient Denies side effects of medication.    Exercise: Walking dog daily  Diet: States appetite increased.  Has been eating more bread this week.    Health Maintenance:   Health Maintenance Topics with due status: Overdue       Topic Date Due    IMM HEP B  VACCINE 1955    IMM DTaP/Tdap/Td Vaccine 09/04/1976    COLONOSCOPY 08/28/2015    RETINAL SCREENING 12/19/2017    MAMMOGRAM 02/13/2018    PAP SMEAR 02/19/2018    URINE ACR / MICROALBUMIN 04/21/2018         DM:   Last A1c:   Lab Results   Component Value Date/Time    HBA1C 7.6 04/26/2018 09:01 AM      A1c goal: < 7    Glucose monitoring frequency: Not testing  trend: It was 130 today after coffee  Hypoglycemic episodes: no     Last Retinal Exam: Needs  Daily Foot Exam: yes  Routine Dental Exams: no    Lab Results   Component Value Date/Time    MALBCRT 5 04/21/2017 01:32 PM    MICROALBUR 1.4 04/21/2017 01:32 PM        ACR Albumin/Creatinine Ratio goal <30     Diabetic complications: none    HTN:   Blood pressure goal <140/<80 .   Currently Rx ACE/ARB: No    Dyslipidemia:    Lab Results   Component Value Date/Time    CHOLSTRLTOT 210 (H) 01/24/2018 07:00 AM     (H) 01/24/2018 07:00 AM    HDL 59 01/24/2018 07:00 AM    TRIGLYCERIDE 56 01/24/2018 07:00 AM       Lab Results   Component Value Date/Time    SODIUM 137 03/30/2018 07:05 AM    POTASSIUM 3.3 (L) 03/30/2018 07:05 AM    CHLORIDE 100 03/30/2018 07:05 AM    CO2 27 03/30/2018 07:05 AM    GLUCOSE 134 (H) 03/30/2018 07:05 AM    BUN 16 03/30/2018 07:05 AM    CREATININE 0.86 03/30/2018 07:05 AM    CREATININE 1.3 01/27/2009 04:34 PM     Lab Results   Component Value Date/Time    ALKPHOSPHAT 95 01/24/2017 07:25 AM    ASTSGOT 17 01/24/2017 07:25 AM    ALTSGPT 20 01/24/2017 07:25 AM    TBILIRUBIN 0.5 01/24/2017 07:25 AM        Currently Rx Statin: Yes      She  reports that she has been smoking Cigarettes.  She has a 5.50 pack-year smoking history. She has never used smokeless tobacco.    Objective:     Exam:  Monofilament: done  Monofilament testing with a 10 gram force: sensation intact: intact bilaterally  Visual Inspection: Feet without maceration, ulcers, fissures.  Pedal pulses: intact bilaterally      Plan:     - Diabetic diet discussed in detail-plate  method.  - Home glucose monitoring.  - She will test and log.    - She will walk for 20-30 minutes daily.  - Reviewed medications and advised to take metformin after meals to decrease   G.I.upset.   - Discussed importance of immunizations and yearly eye exams.   - Encouraged patient to attend diabetes education program.   -Educational material distributed.   - Advised daily foot exams. Educated on signs of infection.       Recommended medication changes: Increase her Metformin to 1000 mg BID.  She states she will increase her vegetables and decrease her carbohydrate.

## 2018-04-27 DIAGNOSIS — E11.9 TYPE 2 DIABETES MELLITUS WITHOUT COMPLICATION, WITHOUT LONG-TERM CURRENT USE OF INSULIN (HCC): ICD-10-CM

## 2018-04-27 LAB
CREAT UR-MCNC: 41.1 MG/DL
MICROALBUMIN UR-MCNC: <0.7 MG/DL
MICROALBUMIN/CREAT UR: NORMAL MG/G (ref 0–30)

## 2018-05-01 ENCOUNTER — HOSPITAL ENCOUNTER (OUTPATIENT)
Facility: MEDICAL CENTER | Age: 63
End: 2018-05-01
Attending: FAMILY MEDICINE
Payer: MEDICARE

## 2018-05-01 ENCOUNTER — OFFICE VISIT (OUTPATIENT)
Dept: MEDICAL GROUP | Facility: MEDICAL CENTER | Age: 63
End: 2018-05-01
Payer: MEDICARE

## 2018-05-01 VITALS
HEIGHT: 67 IN | TEMPERATURE: 98.6 F | HEART RATE: 74 BPM | DIASTOLIC BLOOD PRESSURE: 76 MMHG | BODY MASS INDEX: 24.17 KG/M2 | RESPIRATION RATE: 14 BRPM | WEIGHT: 154 LBS | OXYGEN SATURATION: 94 % | SYSTOLIC BLOOD PRESSURE: 120 MMHG

## 2018-05-01 DIAGNOSIS — Z72.0 TOBACCO USE: ICD-10-CM

## 2018-05-01 DIAGNOSIS — Z12.4 SCREENING FOR CERVICAL CANCER: ICD-10-CM

## 2018-05-01 DIAGNOSIS — Z11.59 NEED FOR HEPATITIS C SCREENING TEST: ICD-10-CM

## 2018-05-01 DIAGNOSIS — Z01.419 WELL FEMALE EXAM WITH ROUTINE GYNECOLOGICAL EXAM: ICD-10-CM

## 2018-05-01 DIAGNOSIS — Z11.51 SCREENING FOR HPV (HUMAN PAPILLOMAVIRUS): ICD-10-CM

## 2018-05-01 DIAGNOSIS — Z11.4 ENCOUNTER FOR SCREENING FOR HIV: ICD-10-CM

## 2018-05-01 DIAGNOSIS — G44.329 CHRONIC POST-TRAUMATIC HEADACHE, NOT INTRACTABLE: ICD-10-CM

## 2018-05-01 PROCEDURE — 88175 CYTOPATH C/V AUTO FLUID REDO: CPT

## 2018-05-01 PROCEDURE — 87624 HPV HI-RISK TYP POOLED RSLT: CPT

## 2018-05-01 PROCEDURE — G0101 CA SCREEN;PELVIC/BREAST EXAM: HCPCS | Performed by: FAMILY MEDICINE

## 2018-05-01 RX ORDER — NORTRIPTYLINE HYDROCHLORIDE 25 MG/1
25 CAPSULE ORAL
Qty: 90 CAP | Refills: 1 | Status: SHIPPED
Start: 2018-05-01 | End: 2020-03-13

## 2018-05-01 ASSESSMENT — PATIENT HEALTH QUESTIONNAIRE - PHQ9: CLINICAL INTERPRETATION OF PHQ2 SCORE: 0

## 2018-05-01 NOTE — PROGRESS NOTES
cc: well exam    Subjective:     Gia GUEVARA is a 62 y.o. female presents for a routine preventive health exam.    Ob-Gyn/ History:    /Para:    Last Pap Smear:  2015. no history of abnormal pap smears.  Gyn Surgery:  Tubal.  Current Contraceptive Method:  Is not currently sexually active.  Last menstrual period:  15-20 yrs ago  Post-menopausal bleeding: none  Urinary incontinence: none    Health Maintenance  Advanced directive: discussed, handout given  Osteoporosis Screen/ DEXA: n/a   PT/vit D for falls prevention: n/a   Cholesterol Screenin2018   Diabetes Screening: n/a  AAA Screening: n/a   Aspirin Use: taking    Diet: discussed   Exercise: trying to exercise more   Substance Abuse: none   Seat belts, bike helmet, gun safety discussed.  Sun protection used.    Cancer screening  Colorectal Cancer Screening: FIT ordered    Lung Cancer Screening: doesn't meet criteria    Cervical Cancer Screening: collected today    Breast Cancer Screening: scheduled for may     Infectious disease screening/Immunizations  --STI Screening: declined   --Practices safe sex.  --HIV Screening: ordered   --Hepatitis C Screening: ordered   --Immunizations:    Influenza: n/a    Tetanus: declined    Shingles: discussed, declined    Pneumococcal : ppsv23 2014       Review of systems:  Has been having her daily headache that otc meds have not be helping.  Denies any weight loss, fevers, fatigue, vision changes, sore throat, swollen glands, rashes, shortness of breath, chest pain, numbness, nausea, vomiting, diarrhea, constipation, abdominal pain, dysuria, hematuria,  joint pain, muscle weakness, depression.  All other systems were reviewed and are negative.      Current Outpatient Prescriptions:   •  nortriptyline (PAMELOR) 25 MG Cap, Take 1 Cap by mouth every bedtime., Disp: 90 Cap, Rfl: 1  •  metFORMIN ER (GLUCOPHAGE XR) 500 MG TABLET SR 24 HR, Take 2 Tabs by mouth 2 times a day., Disp: , Rfl:   •  potassium  chloride SA (KDUR) 20 MEQ Tab CR, Take 1 Tab by mouth 2 times a day., Disp: 60 Tab, Rfl: 6  •  hydroCHLOROthiazide (HYDRODIURIL) 12.5 MG tablet, Take 1 Tab by mouth every day., Disp: 30 Tab, Rfl: 6  •  methimazole (TAPAZOLE) 5 MG Tab, Take 1 Tab by mouth every day. (Patient taking differently: Take 5 mg by mouth 2 Times a Day.), Disp: 30 Tab, Rfl: 6  •  Lancets Misc, Use 1x/day and PRN for signs and symptoms of high or low blood sugar. Dx E 11.65, Disp: 100 Each, Rfl: 3  •  Blood Glucose Monitoring Suppl Kit, Test blood sugar as recommended by provider.  Dx E 11.65, Disp: 1 Kit, Rfl: PRN  •  Blood Glucose Monitoring Suppl SUPPLIES Misc, For test strips: Use 1x/day and PRN for signs and symptoms of high or low blood sugar. Dx E 11.65, Disp: 100 Each, Rfl: 3  •  simvastatin (ZOCOR) 20 MG Tab, Take 1 Tab by mouth every evening. Indications: high cholesterol, Disp: 90 Tab, Rfl: 3  •  phenytoin ER (DILANTIN) 100 MG Cap, Take 1 Cap by mouth every day., Disp: 1 Cap, Rfl: 0  •  albuterol (PROVENTIL HFA) 108 (90 BASE) MCG/ACT Aero Soln inhalation aerosol, Inhale 2 Puffs by mouth every four hours as needed for Shortness of Breath., Disp: 1 Inhaler, Rfl: 3  •  nitroglycerin (NITROSTAT) 0.4 MG SL Tab, Place 1 Tab under tongue as needed for Chest Pain., Disp: 25 Tab, Rfl: 1  •  ASPIRIN 81 MG PO TBEC, Take  by mouth. qd , Disp: , Rfl:     No Known Allergies    Past Medical History:   Diagnosis Date   • Anxiety    • Arrhythmia    • Chronic headache    • Depression    • Grave's disease    • Meningitis 1950   • Seizures (HCC)    • Urinary tract infection, site not specified      Past Surgical History:   Procedure Laterality Date   • TUBAL LIGATION       Family History   Problem Relation Age of Onset   • Diabetes Mother    • Heart Disease Mother      MI   • Hypertension Mother    • Stroke Father    • Hypertension Father    • Diabetes Sister      Social History     Social History   • Marital status: Single     Spouse name: N/A   •  "Number of children: N/A   • Years of education: N/A     Occupational History   • Not on file.     Social History Main Topics   • Smoking status: Current Every Day Smoker     Packs/day: 0.25     Years: 22.00     Types: Cigarettes   • Smokeless tobacco: Never Used      Comment: 1-2 cigs/day   • Alcohol use No   • Drug use: No   • Sexual activity: Not Currently     Other Topics Concern   • Not on file     Social History Narrative   • No narrative on file       Objective:     Vitals: /76   Pulse 74   Temp 37 °C (98.6 °F)   Resp 14   Ht 1.702 m (5' 7\")   Wt 69.9 kg (154 lb)   SpO2 94%   BMI 24.12 kg/m²   General: Alert, pleasant, NAD  HEENT:  Normocephalic.  PERRL, EOMI, no icterus or pallor.  Conjunctivae and lids normal.  External ears normal. Tympanic membranes pearly, opaque.  Nares patent, mucosa pink.  Oropharynx non-erythematous, mucous membranes moist.  Neck supple.  No thyromegaly or masses palpated. No cervical or supraclavicular lymphadenopathy.  Heart:  Regular rate and rhythm.  S1 and S2 normal.  No murmurs appreciated.  Respiratory:  Normal respiratory effort.  Clear to auscultation bilaterally.  Abdomen:  Bowel sounds present.  Non-distended, soft, non-tender, no guarding/rebound. No hepatosplenomegaly.  No hernias.  Pelvic exam: Normal external genitalia including urethral meatus.  Well supported, nontender urethra and bladder.  Vagina and cervix without significant discharge or lesions.  Cervix shifted to the right, difficult to visualize. No cervical motion tenderness.  Anteflexed uterus with fibroids, normal shape and consistency.  No adnexal masses or tenderness.     Rectal:  Normal external anus, no hemorrhoids.    Skin:  Warm, dry, no rashes  Musculoskeletal:  Gait is normal.  Moves all extremities well.  Extremities:  Pedal pulses 2+ symmetric. No leg edema.  Neurological: No tremors, sensation grossly intact, patellar reflexes 2+ symmetric, tone/strength normal  Psych:  Affect/mood is " normal, judgement is good, memory is intact, grooming is appropriate.      Assessment/Plan:     Gia was seen today for gynecologic exam.    Diagnoses and all orders for this visit:    Well female exam with routine gynecological exam  Screening for cervical cancer  Screening for HPV (human papillomavirus)  -     THINPREP PAP WITH HPV; Future    Encounter for screening for HIV  -     HIV AG/AB COMBO ASSAY SCREENING; Future    Need for hepatitis C screening test  -     HEP C VIRUS ANTIBODY; Future    Chronic post-traumatic headache, not intractable  -     nortriptyline (PAMELOR) 25 MG Cap; Take 1 Cap by mouth every bedtime.    Tobacco use  Advised to quit, pt is working on this      Patient Counseling:  --Discussed moderation in sodium/caffeine intake, saturated fat and cholesterol, caloric balance, sufficient fresh fruits/vegetables, fiber, iron  --Discussed brushing, flossing, and dental visits. Hand out of low cost dental clinics given to pt  --Encouraged regular exercise.   --Discussed tobacco, alcohol, or other drug use; availability of treatment for abuse.   --Discussed sexually transmitted infections, partner selection, use of condoms  --Injury prevention: Discussed safety belts, safety helmets, smoke detector, etc.    Preventive visit in 1 year, sooner as needed for any concerns.

## 2018-05-02 DIAGNOSIS — Z11.51 SCREENING FOR HPV (HUMAN PAPILLOMAVIRUS): ICD-10-CM

## 2018-05-02 DIAGNOSIS — Z12.4 SCREENING FOR CERVICAL CANCER: ICD-10-CM

## 2018-05-02 DIAGNOSIS — Z01.419 WELL FEMALE EXAM WITH ROUTINE GYNECOLOGICAL EXAM: ICD-10-CM

## 2018-05-03 LAB
CYTOLOGY REG CYTOL: NORMAL
HPV HR 12 DNA CVX QL NAA+PROBE: NEGATIVE
HPV16 DNA SPEC QL NAA+PROBE: NEGATIVE
HPV18 DNA SPEC QL NAA+PROBE: NEGATIVE
SPECIMEN SOURCE: NORMAL

## 2018-05-11 ENCOUNTER — HOSPITAL ENCOUNTER (OUTPATIENT)
Dept: RADIOLOGY | Facility: MEDICAL CENTER | Age: 63
End: 2018-05-11
Attending: FAMILY MEDICINE
Payer: MEDICARE

## 2018-05-11 DIAGNOSIS — Z12.31 ENCOUNTER FOR SCREENING MAMMOGRAM FOR MALIGNANT NEOPLASM OF BREAST: ICD-10-CM

## 2018-05-11 PROCEDURE — 77067 SCR MAMMO BI INCL CAD: CPT

## 2018-05-21 ENCOUNTER — HOSPITAL ENCOUNTER (OUTPATIENT)
Dept: LAB | Facility: MEDICAL CENTER | Age: 63
End: 2018-05-21
Attending: FAMILY MEDICINE
Payer: MEDICARE

## 2018-05-21 DIAGNOSIS — Z11.59 NEED FOR HEPATITIS C SCREENING TEST: ICD-10-CM

## 2018-05-21 DIAGNOSIS — Z11.4 ENCOUNTER FOR SCREENING FOR HIV: ICD-10-CM

## 2018-05-21 LAB
HCV AB SER QL: NEGATIVE
HIV 1+2 AB+HIV1 P24 AG SERPL QL IA: NON REACTIVE

## 2018-05-21 PROCEDURE — 86803 HEPATITIS C AB TEST: CPT | Mod: GY

## 2018-05-21 PROCEDURE — 36415 COLL VENOUS BLD VENIPUNCTURE: CPT | Mod: GY

## 2018-05-21 PROCEDURE — G0475 HIV COMBINATION ASSAY: HCPCS | Mod: GY

## 2018-05-23 ENCOUNTER — TELEPHONE (OUTPATIENT)
Dept: MEDICAL GROUP | Facility: MEDICAL CENTER | Age: 63
End: 2018-05-23

## 2018-05-23 NOTE — TELEPHONE ENCOUNTER
1. Caller Name: codie                                         Call Back Number: [phone] (home)         Patient approves a detailed voicemail message: yes    lance called sal to let you know she has been havin tooth pain and if you can order medication

## 2018-05-24 RX ORDER — NAPROXEN 500 MG/1
500 TABLET ORAL 2 TIMES DAILY WITH MEALS
Qty: 60 TAB | Refills: 0 | Status: SHIPPED | OUTPATIENT
Start: 2018-05-24 | End: 2018-11-12

## 2018-05-24 RX ORDER — DOXYCYCLINE HYCLATE 100 MG
TABLET ORAL
Qty: 20 TAB | Refills: 0 | Status: SHIPPED | OUTPATIENT
Start: 2018-05-24 | End: 2018-07-31

## 2018-07-12 DIAGNOSIS — E78.5 DYSLIPIDEMIA: ICD-10-CM

## 2018-07-12 RX ORDER — SIMVASTATIN 20 MG
20 TABLET ORAL EVERY EVENING
Qty: 90 TAB | Refills: 3 | Status: SHIPPED | OUTPATIENT
Start: 2018-07-12 | End: 2019-03-19

## 2018-07-26 ENCOUNTER — APPOINTMENT (OUTPATIENT)
Dept: MEDICAL GROUP | Facility: MEDICAL CENTER | Age: 63
End: 2018-07-26
Payer: MEDICARE

## 2018-07-30 NOTE — PROGRESS NOTES
cc:  Chest pain    Subjective:     Gia GUEVARA is a 62 y.o. female presenting for:    1. Chest pain: For the past month, has been having intermittent, sharp, left-sided chest pain that does not radiate. Will last for a second, but will then return throughout the day. Will occasionally feel warm and clammy on her skin. Denies any lightheadedness, dizziness, shortness of breath, leg swelling.  She has not had symptoms for the past 2 days since she started her thyroid medication. She reports forgetting to take that medication. She has nitroglycerin at home, but did not try this as she could not find it. She continues to smoke 2-3 cigarettes a day    2. Diabetes, HTN, HLD:    --asa: takes 81mg daily, no stomach or stool issues  --a1c:  Today is 6.9, was 7.6 4/2018.  Is currently taking metformin 500mg BID.   she has not switched to the ER form as she did not want to waste her medication. Denies any hypoglycemic episodes.  --BP: normal today.  Has been normotensive.  Is currently on hctz 12.5g daily and potassium.  Last BMP was 3/2018  --LDL: last checked 1/2018.  Is on zocor 20 mg daily.  No myalgias  --tobacco: smoking, has cut down to about 2-3 cig/day.  Wants to quit eventually.     --eye exam: last exam 12/2016.  No vision changes.  --foot exam: last exam 4/2018.  Denies any foot, skin problems.  No numbness/tingling.  --microalbumin: last checked 4/2018.  No urinary symptoms.       3. Seizures: has hx of seizures after a head injury. Was taking dilantin 100mg daily but stopped this on her own. Last seizure was about 2 years ago    Review of systems:  See above.       Current Outpatient Prescriptions:   •  methimazole (TAPAZOLE) 5 MG Tab, Take 1 Tab by mouth 3 times a day., Disp: 270 Tab, Rfl: 1  •  metFORMIN ER (GLUCOPHAGE XR) 500 MG TABLET SR 24 HR, Take 1 Tab by mouth 2 times a day., Disp: , Rfl:   •  simvastatin (ZOCOR) 20 MG Tab, Take 1 Tab by mouth every evening., Disp: 90 Tab, Rfl: 3  •  naproxen  "(NAPROSYN) 500 MG Tab, Take 1 Tab by mouth 2 times a day, with meals. For pain, Disp: 60 Tab, Rfl: 0  •  nortriptyline (PAMELOR) 25 MG Cap, Take 1 Cap by mouth every bedtime., Disp: 90 Cap, Rfl: 1  •  potassium chloride SA (KDUR) 20 MEQ Tab CR, Take 1 Tab by mouth 2 times a day., Disp: 60 Tab, Rfl: 6  •  hydroCHLOROthiazide (HYDRODIURIL) 12.5 MG tablet, Take 1 Tab by mouth every day., Disp: 30 Tab, Rfl: 6  •  Lancets Misc, Use 1x/day and PRN for signs and symptoms of high or low blood sugar. Dx E 11.65, Disp: 100 Each, Rfl: 3  •  Blood Glucose Monitoring Suppl Kit, Test blood sugar as recommended by provider.  Dx E 11.65, Disp: 1 Kit, Rfl: PRN  •  Blood Glucose Monitoring Suppl SUPPLIES Misc, For test strips: Use 1x/day and PRN for signs and symptoms of high or low blood sugar. Dx E 11.65, Disp: 100 Each, Rfl: 3  •  albuterol (PROVENTIL HFA) 108 (90 BASE) MCG/ACT Aero Soln inhalation aerosol, Inhale 2 Puffs by mouth every four hours as needed for Shortness of Breath., Disp: 1 Inhaler, Rfl: 3  •  nitroglycerin (NITROSTAT) 0.4 MG SL Tab, Place 1 Tab under tongue as needed for Chest Pain., Disp: 25 Tab, Rfl: 1  •  ASPIRIN 81 MG PO TBEC, Take  by mouth. qd , Disp: , Rfl:     Allergies, past medical history, past surgical history, family history, social history reviewed and updated    Objective:     Vitals: /76   Pulse 76   Temp 37 °C (98.6 °F)   Resp 14   Ht 1.702 m (5' 7\")   Wt 71.7 kg (158 lb)   SpO2 98%   BMI 24.75 kg/m²   General: Alert, pleasant, NAD  HEENT: Normocephalic.  PERRL, EOMI, no icterus or pallor.  Conjunctivae and lids normal. External ears normal. Oropharynx non-erythematous, mucous membranes moist.  Neck supple.  No thyromegaly or masses palpated. No cervical or supraclavicular lymphadenopathy.  Heart: Regular rate and rhythm.  S1 and S2 normal.  No murmurs appreciated.  Respiratory: Normal respiratory effort.  Clear to auscultation bilaterally.  Abdomen: Non-distended, soft  Skin: Warm, " dry, no rashes.  Musculoskeletal: Gait is normal.  Moves all extremities well.  Extremities: No leg edema.   Psych:  Affect/mood is normal, judgement is good, memory is intact, grooming is appropriate.    EKG: Sinus bradycardia. Heart rate 57 bpm. Normal axis, intervals. No ST segment changes, no Q waves. Occasional PVC    Assessment/Plan:     Gia was seen today for diabetes mellitus.    Diagnoses and all orders for this visit:    Chest pain, unspecified type  Will check labs, stress test.  ekg was unremarkable. Discussed reasons to call 911/ER.  -     EKG  -     CBC WITHOUT DIFFERENTIAL; Future  -     COMP METABOLIC PANEL; Future  -     TSH; Future  -     FREE THYROXINE; Future  -     NM-CARDIAC STRESS TEST; Future    Graves disease  Hyperthyroidism  Discussed importance of taking her medications regularly. She is due for follow-up with endocrinology, reminded patient to schedule an appointment. We'll check labs.  -     methimazole (TAPAZOLE) 5 MG Tab; Take 1 Tab by mouth 3 times a day.  -     CBC WITHOUT DIFFERENTIAL; Future  -     COMP METABOLIC PANEL; Future  -     TSH; Future  -     FREE THYROXINE; Future    Type 2 diabetes mellitus without complication, without long-term current use of insulin (HCC)  Stable, continue metformin 500 mg twice a day. Follow-up in 3 months.  -     POCT Hemoglobin A1C  -     CBC WITHOUT DIFFERENTIAL; Future  -     COMP METABOLIC PANEL; Future  -     NM-CARDIAC STRESS TEST; Future    Tobacco use  Advised to quit, patient is working on this  -     NM-CARDIAC STRESS TEST; Future    Essential hypertension  Stable, continue hydrochlorothiazide  -     NM-CARDIAC STRESS TEST; Future    Dyslipidemia  Stable, continue simvastatin  -     NM-CARDIAC STRESS TEST; Future    Screen for colon cancer  Discussed that she is overdue  -     REFERRAL TO GI FOR COLONOSCOPY        Return in about 3 months (around 10/31/2018) for Med check.

## 2018-07-31 ENCOUNTER — OFFICE VISIT (OUTPATIENT)
Dept: MEDICAL GROUP | Facility: MEDICAL CENTER | Age: 63
End: 2018-07-31
Payer: MEDICARE

## 2018-07-31 VITALS
SYSTOLIC BLOOD PRESSURE: 124 MMHG | TEMPERATURE: 98.6 F | HEIGHT: 67 IN | RESPIRATION RATE: 14 BRPM | DIASTOLIC BLOOD PRESSURE: 76 MMHG | OXYGEN SATURATION: 98 % | HEART RATE: 76 BPM | BODY MASS INDEX: 24.8 KG/M2 | WEIGHT: 158 LBS

## 2018-07-31 DIAGNOSIS — Z12.11 SCREEN FOR COLON CANCER: ICD-10-CM

## 2018-07-31 DIAGNOSIS — E05.00 GRAVES DISEASE: ICD-10-CM

## 2018-07-31 DIAGNOSIS — E11.9 TYPE 2 DIABETES MELLITUS WITHOUT COMPLICATION, WITHOUT LONG-TERM CURRENT USE OF INSULIN (HCC): ICD-10-CM

## 2018-07-31 DIAGNOSIS — E78.5 DYSLIPIDEMIA: ICD-10-CM

## 2018-07-31 DIAGNOSIS — R07.9 CHEST PAIN, UNSPECIFIED TYPE: ICD-10-CM

## 2018-07-31 DIAGNOSIS — E05.90 HYPERTHYROIDISM: ICD-10-CM

## 2018-07-31 DIAGNOSIS — I10 ESSENTIAL HYPERTENSION: ICD-10-CM

## 2018-07-31 DIAGNOSIS — Z72.0 TOBACCO USE: ICD-10-CM

## 2018-07-31 LAB
HBA1C MFR BLD: 6.9 % (ref ?–5.8)
INT CON NEG: NORMAL
INT CON POS: NORMAL

## 2018-07-31 PROCEDURE — 99214 OFFICE O/P EST MOD 30 MIN: CPT | Mod: 25 | Performed by: FAMILY MEDICINE

## 2018-07-31 PROCEDURE — 93000 ELECTROCARDIOGRAM COMPLETE: CPT | Performed by: FAMILY MEDICINE

## 2018-07-31 PROCEDURE — 83036 HEMOGLOBIN GLYCOSYLATED A1C: CPT | Performed by: FAMILY MEDICINE

## 2018-07-31 RX ORDER — METHIMAZOLE 5 MG/1
5 TABLET ORAL 3 TIMES DAILY
Qty: 270 TAB | Refills: 1 | Status: SHIPPED | OUTPATIENT
Start: 2018-07-31 | End: 2020-09-22 | Stop reason: SDUPTHER

## 2018-07-31 RX ORDER — METFORMIN HYDROCHLORIDE 500 MG/1
500 TABLET, EXTENDED RELEASE ORAL 2 TIMES DAILY
COMMUNITY
Start: 2018-07-31 | End: 2018-11-12 | Stop reason: SDUPTHER

## 2018-10-01 DIAGNOSIS — I10 ESSENTIAL HYPERTENSION: ICD-10-CM

## 2018-10-01 RX ORDER — HYDROCHLOROTHIAZIDE 12.5 MG/1
12.5 TABLET ORAL DAILY
Qty: 30 TAB | Refills: 6 | Status: SHIPPED | OUTPATIENT
Start: 2018-10-01 | End: 2019-06-11 | Stop reason: SDUPTHER

## 2018-11-04 ENCOUNTER — APPOINTMENT (OUTPATIENT)
Dept: RADIOLOGY | Facility: MEDICAL CENTER | Age: 63
End: 2018-11-04
Attending: EMERGENCY MEDICINE
Payer: MEDICARE

## 2018-11-04 ENCOUNTER — HOSPITAL ENCOUNTER (EMERGENCY)
Facility: MEDICAL CENTER | Age: 63
End: 2018-11-04
Attending: EMERGENCY MEDICINE
Payer: MEDICARE

## 2018-11-04 VITALS
OXYGEN SATURATION: 95 % | DIASTOLIC BLOOD PRESSURE: 89 MMHG | RESPIRATION RATE: 17 BRPM | HEIGHT: 67 IN | WEIGHT: 164.46 LBS | HEART RATE: 80 BPM | BODY MASS INDEX: 25.81 KG/M2 | SYSTOLIC BLOOD PRESSURE: 144 MMHG | TEMPERATURE: 97.5 F

## 2018-11-04 DIAGNOSIS — M79.89 SWELLING OF RIGHT HAND: ICD-10-CM

## 2018-11-04 DIAGNOSIS — M75.31 CALCIFIC TENDINITIS OF RIGHT SHOULDER: ICD-10-CM

## 2018-11-04 PROCEDURE — 99284 EMERGENCY DEPT VISIT MOD MDM: CPT

## 2018-11-04 PROCEDURE — 96372 THER/PROPH/DIAG INJ SC/IM: CPT

## 2018-11-04 PROCEDURE — 73030 X-RAY EXAM OF SHOULDER: CPT | Mod: RT

## 2018-11-04 PROCEDURE — 700111 HCHG RX REV CODE 636 W/ 250 OVERRIDE (IP): Performed by: EMERGENCY MEDICINE

## 2018-11-04 PROCEDURE — 93971 EXTREMITY STUDY: CPT | Mod: RT

## 2018-11-04 RX ORDER — ONDANSETRON 4 MG/1
4 TABLET, ORALLY DISINTEGRATING ORAL EVERY 8 HOURS PRN
Qty: 10 TAB | Refills: 1 | Status: SHIPPED | OUTPATIENT
Start: 2018-11-04 | End: 2018-11-04 | Stop reason: CLARIF

## 2018-11-04 RX ORDER — ONDANSETRON 4 MG/1
4 TABLET, ORALLY DISINTEGRATING ORAL ONCE
Status: COMPLETED | OUTPATIENT
Start: 2018-11-04 | End: 2018-11-04

## 2018-11-04 RX ORDER — IBUPROFEN 600 MG/1
600 TABLET ORAL EVERY 6 HOURS PRN
Qty: 20 TAB | Refills: 0 | Status: SHIPPED | OUTPATIENT
Start: 2018-11-04 | End: 2018-11-12 | Stop reason: SDUPTHER

## 2018-11-04 RX ORDER — HYDROXYZINE PAMOATE 50 MG/1
50 CAPSULE ORAL 3 TIMES DAILY PRN
Qty: 30 CAP | Refills: 1 | Status: SHIPPED | OUTPATIENT
Start: 2018-11-04 | End: 2018-11-04 | Stop reason: CLARIF

## 2018-11-04 RX ORDER — ONDANSETRON 2 MG/ML
4 INJECTION INTRAMUSCULAR; INTRAVENOUS ONCE
Status: DISCONTINUED | OUTPATIENT
Start: 2018-11-04 | End: 2018-11-04

## 2018-11-04 RX ORDER — KETOROLAC TROMETHAMINE 30 MG/ML
30 INJECTION, SOLUTION INTRAMUSCULAR; INTRAVENOUS ONCE
Status: COMPLETED | OUTPATIENT
Start: 2018-11-04 | End: 2018-11-04

## 2018-11-04 RX ORDER — MORPHINE SULFATE 4 MG/ML
4 INJECTION, SOLUTION INTRAMUSCULAR; INTRAVENOUS ONCE
Status: COMPLETED | OUTPATIENT
Start: 2018-11-04 | End: 2018-11-04

## 2018-11-04 RX ORDER — TRAMADOL HYDROCHLORIDE 50 MG/1
50-100 TABLET ORAL EVERY 6 HOURS PRN
Qty: 20 TAB | Refills: 0 | Status: SHIPPED | OUTPATIENT
Start: 2018-11-04 | End: 2018-11-09

## 2018-11-04 RX ADMIN — MORPHINE SULFATE 4 MG: 4 INJECTION INTRAVENOUS at 09:07

## 2018-11-04 RX ADMIN — KETOROLAC TROMETHAMINE 30 MG: 30 INJECTION, SOLUTION INTRAMUSCULAR at 09:07

## 2018-11-04 RX ADMIN — ONDANSETRON 4 MG: 4 TABLET, ORALLY DISINTEGRATING ORAL at 09:08

## 2018-11-04 ASSESSMENT — PAIN SCALES - GENERAL: PAINLEVEL_OUTOF10: 10

## 2018-11-04 ASSESSMENT — PAIN DESCRIPTION - DESCRIPTORS: DESCRIPTORS: SHOOTING

## 2018-11-04 NOTE — ED TRIAGE NOTES
Pt ambulatory to triage. Pt c/o right arm and right shoulder pain. Pt denies trauma to the area. CMS intact. Pt states the pain feels like nerve pain.     Chief Complaint   Patient presents with   • Arm Pain     Pt placed in lobby, updated on triage process. Pt educated to notified RN or triage tech if changes in condition.

## 2018-11-04 NOTE — ED PROVIDER NOTES
ED Provider Note    CHIEF COMPLAINT  Chief Complaint   Patient presents with   • Arm Pain       HPI  Gia GUEVARA is a 63 y.o. female who presents with tenderness right posterior shoulder, worse with movement.  She appears uncomfortable and is holding her right arm.  No associated chest pain or shortness of breath.  No pleurisy.  Patient believes she may have slept on her arm wrong.  She denies trauma.  At times pain radiates down her arm with tingling in her hand.  She denies worsening pain with movement of her neck.  She states her right hand is swollen.  She denies history of DVT.  No pleuritic chest pain or cough.  Patient has been using Motrin at home without relief.    REVIEW OF SYSTEMS    Constitutional: No fever  Respiratory: No pleurisy or shortness of breath, no cough  Cardiac: No chest pain or syncope  Gastrointestinal: No abdominal pain  Musculoskeletal: Right shoulder pain  Neurologic: Intermittent tingling right hand.  No numbness or weakness  Skin: Swelling right hand       All other systems are negative.       PAST MEDICAL HISTORY  Past Medical History:   Diagnosis Date   • Anxiety    • Arrhythmia    • Chronic headache    • Depression    • Grave's disease    • Meningitis 1950   • Seizures (HCC)    • Urinary tract infection, site not specified        FAMILY HISTORY  Family History   Problem Relation Age of Onset   • Diabetes Mother    • Heart Disease Mother         MI   • Hypertension Mother    • Stroke Father    • Hypertension Father    • Diabetes Sister    • Colon Cancer Maternal Grandmother 80       SOCIAL HISTORY  Social History     Social History   • Marital status: Single     Spouse name: N/A   • Number of children: N/A   • Years of education: N/A     Social History Main Topics   • Smoking status: Current Every Day Smoker     Packs/day: 0.25     Years: 22.00     Types: Cigarettes   • Smokeless tobacco: Never Used      Comment: 1-2 cigs/day   • Alcohol use No   • Drug use: No   • Sexual  "activity: Not Currently     Other Topics Concern   • Not on file     Social History Narrative   • No narrative on file       SURGICAL HISTORY  Past Surgical History:   Procedure Laterality Date   • TUBAL LIGATION         CURRENT MEDICATIONS  Home Medications     Reviewed by Chester Russell R.N. (Registered Nurse) on 11/04/18 at 0841  Med List Status: Not Addressed   Medication Last Dose Status   albuterol (PROVENTIL HFA) 108 (90 BASE) MCG/ACT Aero Soln inhalation aerosol 7/31/2018 Active   ASPIRIN 81 MG PO TBEC 7/31/2018 Active   Blood Glucose Monitoring Suppl Kit 7/31/2018 Active   Blood Glucose Monitoring Suppl SUPPLIES Misc 7/31/2018 Active   hydroCHLOROthiazide (HYDRODIURIL) 12.5 MG tablet  Active   Lancets Misc 7/31/2018 Active   metFORMIN ER (GLUCOPHAGE XR) 500 MG TABLET SR 24 HR  Active   methimazole (TAPAZOLE) 5 MG Tab  Active   naproxen (NAPROSYN) 500 MG Tab 7/31/2018 Active   nitroglycerin (NITROSTAT) 0.4 MG SL Tab 7/31/2018 Active   nortriptyline (PAMELOR) 25 MG Cap 7/31/2018 Active   potassium chloride SA (KDUR) 20 MEQ Tab CR 7/31/2018 Active   simvastatin (ZOCOR) 20 MG Tab 7/31/2018 Active                ALLERGIES  No Known Allergies    PHYSICAL EXAM  VITAL SIGNS: /89   Pulse 80   Temp 36.4 °C (97.5 °F)   Resp 17   Ht 1.702 m (5' 7\")   Wt 74.6 kg (164 lb 7.4 oz)   SpO2 95%   BMI 25.76 kg/m²   Constitutional:  Non-toxic appearance.   HENT: No facial swelling  Eyes: Anicteric, no conjunctivitis.     Cardiovascular: Normal heart rate, Normal rhythm  Pulmonary:  No wheezing, No rales.   Gastrointestinal: Soft, No tenderness, No masses  Skin: Warm, Dry, No cyanosis.  Right hand mildly swollen.  No forearm or proximal arm swelling.  No lymphangitis, no cellulitis, no bruising  Neurologic: Speech is clear, follows commands, facial expression is symmetrical.  Right hand  strength and sensation grossly intact  Psychiatric: Affect normal,  Mood normal.  Patient is calm and " cooperative  Musculoskeletal: Tenderness posterior right shoulder as well as superior aspect.        RADIOLOGY/PROCEDURES  US-EXTREMITY VENOUS UPPER UNILAT RIGHT   Final Result      DX-SHOULDER 2+ RIGHT   Final Result      Acromioclavicular and glenohumeral degenerative changes.      Calcific density adjacent to the greater tuberosity of the humerus can be seen in calcific tendinopathy.            COURSE & MEDICAL DECISION MAKING  Pertinent Labs & Imaging studies reviewed. (See chart for details)  Ultrasound obtained which was negative for DVT.  X-ray of the right shoulder shows evidence of degenerative joint disease as well as calcific tendinitis.  Suspect tendinitis is caused swelling with radicular symptoms and downstream swelling.  There is no evidence of arterial occlusion with normal pulses in the right wrist.  Patient has been given a sling for comfort, referred to orthopedics.  Secondary to poor pain control at home with Motrin and Tylenol, she is prescribed Ultram.    FINAL IMPRESSION     1. Calcific tendinitis of right shoulder    2. Swelling of right hand         In prescribing controlled substances to this patient, I certify that I have obtained and reviewed the medical history of Gia GUEVARA. I have also made a good rick effort to obtain applicable records from other providers who have treated the patient and records did not demonstrate any increased risk of substance abuse that would prevent me from prescribing controlled substances.     I have conducted a physical exam and documented it. I have reviewed Ms. GUEVARA’s prescription history as maintained by the [place] Prescription Monitoring Program.     I have assessed the patient’s risk for abuse, dependency, and addiction using the validated Opioid Risk Tool available at https://www.mdcalc.com/hbrsma-rkuc-fbtm-ort-narcotic-abuse.     Given the above, I believe the benefits of controlled substance therapy outweigh the risks. The reasons  for prescribing controlled substances include non-narcotic, oral analgesic alternatives have been inadequate for pain control. Accordingly, I have discussed the risk and benefits, treatment plan, and alternative therapies with the patient.                 Electronically signed by: Harley Conley, 11/5/2018 6:35 PM

## 2018-11-04 NOTE — ED NOTES
Patient verbalizes understanding of follow up, pain management, narcotic usage, and when to return to the ED

## 2018-11-12 ENCOUNTER — OFFICE VISIT (OUTPATIENT)
Dept: MEDICAL GROUP | Facility: MEDICAL CENTER | Age: 63
End: 2018-11-12
Payer: MEDICARE

## 2018-11-12 VITALS
OXYGEN SATURATION: 95 % | DIASTOLIC BLOOD PRESSURE: 80 MMHG | SYSTOLIC BLOOD PRESSURE: 128 MMHG | HEIGHT: 67 IN | HEART RATE: 80 BPM | WEIGHT: 164 LBS | TEMPERATURE: 98.2 F | BODY MASS INDEX: 25.74 KG/M2 | RESPIRATION RATE: 16 BRPM

## 2018-11-12 DIAGNOSIS — Z72.0 TOBACCO USE: ICD-10-CM

## 2018-11-12 DIAGNOSIS — E05.00 GRAVES' DISEASE: ICD-10-CM

## 2018-11-12 DIAGNOSIS — E11.9 TYPE 2 DIABETES MELLITUS WITHOUT COMPLICATION, WITHOUT LONG-TERM CURRENT USE OF INSULIN (HCC): ICD-10-CM

## 2018-11-12 DIAGNOSIS — E78.5 DYSLIPIDEMIA: ICD-10-CM

## 2018-11-12 DIAGNOSIS — I10 ESSENTIAL HYPERTENSION: ICD-10-CM

## 2018-11-12 DIAGNOSIS — M65.20 CALCIFIC TENDINITIS: ICD-10-CM

## 2018-11-12 LAB
HBA1C MFR BLD: 8.1 % (ref ?–5.8)
INT CON NEG: NORMAL
INT CON POS: NORMAL

## 2018-11-12 PROCEDURE — 99214 OFFICE O/P EST MOD 30 MIN: CPT | Performed by: FAMILY MEDICINE

## 2018-11-12 PROCEDURE — 83036 HEMOGLOBIN GLYCOSYLATED A1C: CPT | Performed by: FAMILY MEDICINE

## 2018-11-12 RX ORDER — TRAMADOL HYDROCHLORIDE 50 MG/1
50 TABLET ORAL EVERY 8 HOURS PRN
Qty: 42 TAB | Refills: 0 | Status: SHIPPED | OUTPATIENT
Start: 2018-11-12 | End: 2018-11-26

## 2018-11-12 RX ORDER — METFORMIN HYDROCHLORIDE 500 MG/1
1000 TABLET, EXTENDED RELEASE ORAL 2 TIMES DAILY
Qty: 360 TAB | Refills: 1 | Status: SHIPPED | OUTPATIENT
Start: 2018-11-12 | End: 2021-02-26 | Stop reason: SDUPTHER

## 2018-11-12 RX ORDER — IBUPROFEN 800 MG/1
800 TABLET ORAL EVERY 8 HOURS PRN
Qty: 60 TAB | Refills: 0 | Status: SHIPPED | OUTPATIENT
Start: 2018-11-12 | End: 2019-07-08

## 2018-11-12 NOTE — PROGRESS NOTES
cc:  diabetes    Subjective:     Gia GUEVARA is a 63 y.o. female presenting for:      1. Diabetes, HTN, HLD:    --asa: usually takes 81mg daily, no stomach or stool issues.  Has been holding it as she has been taking ibuprofen, see below.  --a1c:  Today is 8.1, was 6.9 7/2018.  Is currently taking metformin xr 500mg BID.  Denies any hypoglycemic episodes.  No polyuria, polydipsia.  Has gained some weight.  --BP: normal today.  Has been normotensive.  Is currently on hctz 12.5g daily and potassium.  Last BMP was 3/2018  --LDL: last checked 1/2018.  Is on zocor 20 mg daily.  No myalgias.  However, she has been feeling somewhat tired lately  --tobacco: smoking, about 2-3 cig/day.    --eye exam: last exam 12/2016.  No vision changes.  --foot exam: last exam 4/2018.  Denies any foot, skin problems.  No numbness/tingling.  --microalbumin: last checked 4/2018.  No urinary symptoms.      2. Shoulder pain: Was recently in the emergency room with severe right shoulder tightness with calcific tendinitis.  Has been taking ibuprofen 800 mg 3 times a day with food as needed and tramadol 50 mg every 6-8 hours.  She has not made an appointment with orthopedics yet.  Pain is stable, mildly improved.  Does have some numbness and tingling in her fingertips.    Opioid Risk Score: 0    Interpretation of Opioid Risk Score   Score 0-3 = Low risk of abuse. Do UDS at least once per year.  Score 4-7 = Moderate risk of abuse. Do UDS 1-4 times per year.  Score 8+ = High risk of abuse. Refer to specialist.        Review of systems:  See above.  Also feeling somewhat tremulous lately.      Current Outpatient Prescriptions:   •  ibuprofen (MOTRIN) 800 MG Tab, Take 1 Tab by mouth every 8 hours as needed for Moderate Pain., Disp: 60 Tab, Rfl: 0  •  tramadol (ULTRAM) 50 MG Tab, Take 1 Tab by mouth every 8 hours as needed for Moderate Pain for up to 14 days., Disp: 42 Tab, Rfl: 0  •  metFORMIN ER (GLUCOPHAGE XR) 500 MG TABLET SR 24 HR,  "Take 2 Tabs by mouth 2 times a day., Disp: 360 Tab, Rfl: 1  •  hydroCHLOROthiazide (HYDRODIURIL) 12.5 MG tablet, Take 1 Tab by mouth every day., Disp: 30 Tab, Rfl: 6  •  methimazole (TAPAZOLE) 5 MG Tab, Take 1 Tab by mouth 3 times a day., Disp: 270 Tab, Rfl: 1  •  simvastatin (ZOCOR) 20 MG Tab, Take 1 Tab by mouth every evening., Disp: 90 Tab, Rfl: 3  •  nortriptyline (PAMELOR) 25 MG Cap, Take 1 Cap by mouth every bedtime., Disp: 90 Cap, Rfl: 1  •  potassium chloride SA (KDUR) 20 MEQ Tab CR, Take 1 Tab by mouth 2 times a day., Disp: 60 Tab, Rfl: 6  •  Lancets Misc, Use 1x/day and PRN for signs and symptoms of high or low blood sugar. Dx E 11.65, Disp: 100 Each, Rfl: 3  •  Blood Glucose Monitoring Suppl Kit, Test blood sugar as recommended by provider.  Dx E 11.65, Disp: 1 Kit, Rfl: PRN  •  Blood Glucose Monitoring Suppl SUPPLIES Misc, For test strips: Use 1x/day and PRN for signs and symptoms of high or low blood sugar. Dx E 11.65, Disp: 100 Each, Rfl: 3  •  albuterol (PROVENTIL HFA) 108 (90 BASE) MCG/ACT Aero Soln inhalation aerosol, Inhale 2 Puffs by mouth every four hours as needed for Shortness of Breath., Disp: 1 Inhaler, Rfl: 3  •  nitroglycerin (NITROSTAT) 0.4 MG SL Tab, Place 1 Tab under tongue as needed for Chest Pain., Disp: 25 Tab, Rfl: 1  •  ASPIRIN 81 MG PO TBEC, Take  by mouth. qd , Disp: , Rfl:     Allergies, past medical history, past surgical history, family history, social history reviewed and updated    Objective:     Vitals: /80 (BP Location: Right arm, Patient Position: Sitting)   Pulse 80   Temp 36.8 °C (98.2 °F)   Resp 16   Ht 1.702 m (5' 7\")   Wt 74.4 kg (164 lb)   SpO2 95%   BMI 25.69 kg/m²    General: Alert, pleasant, NAD  HEENT: Normocephalic.     Psych:  Affect/mood is normal, judgement is good, memory is intact, grooming is appropriate.    Assessment/Plan:     Gia was seen today for hospital follow-up.    Diagnoses and all orders for this visit:    Type 2 diabetes " mellitus without complication, without long-term current use of insulin (HCC)  Uncontrolled today.  We will increase her metformin to 1000 mg twice a day.  Discussed that she is due for an eye exam.  Follow-up in 3 months.  She declined a flu shot.  -     POCT Hemoglobin A1C  -     metFORMIN ER (GLUCOPHAGE XR) 500 MG TABLET SR 24 HR; Take 2 Tabs by mouth 2 times a day.    Essential hypertension  Stable, continue hydrochlorothiazide    Dyslipidemia  Stable, continue simvastatin    Tobacco use  Advised to quit, patient is working on this    Calcific tendinitis  Stable, recommended that she make an appointment with orthopedics.  Continue ibuprofen and tramadol as needed.  I have reviewed the medical records and have determined that a controlled substance treatment is medically indicated.  Alternatives considered and discussed with patient.  Discussed risks of tramadol use.  ORT is 0.  Controlled substance agreement/informed consent on file.  Desmond queried, appropriate but most recent prescription not on file yet.  Script for 14 days given  -     ibuprofen (MOTRIN) 800 MG Tab; Take 1 Tab by mouth every 8 hours as needed for Moderate Pain.  -     tramadol (ULTRAM) 50 MG Tab; Take 1 Tab by mouth every 8 hours as needed for Moderate Pain for up to 14 days. #42    Graves' disease  Possible stable.  Discussed doing her labs ordered at her last visit, lab slips reprinted for patient.  She will also make an appointment with her endocrinologist.      Return in about 3 months (around 2/12/2019) for Diabetes.

## 2018-11-16 ENCOUNTER — HOSPITAL ENCOUNTER (OUTPATIENT)
Dept: LAB | Facility: MEDICAL CENTER | Age: 63
End: 2018-11-16
Attending: FAMILY MEDICINE
Payer: MEDICARE

## 2018-11-16 DIAGNOSIS — E05.90 HYPERTHYROIDISM: ICD-10-CM

## 2018-11-16 DIAGNOSIS — E11.9 TYPE 2 DIABETES MELLITUS WITHOUT COMPLICATION, WITHOUT LONG-TERM CURRENT USE OF INSULIN (HCC): ICD-10-CM

## 2018-11-16 DIAGNOSIS — R07.9 CHEST PAIN, UNSPECIFIED TYPE: ICD-10-CM

## 2018-11-16 DIAGNOSIS — E05.00 GRAVES DISEASE: ICD-10-CM

## 2018-11-16 LAB
ALBUMIN SERPL BCP-MCNC: 4 G/DL (ref 3.2–4.9)
ALBUMIN/GLOB SERPL: 1.2 G/DL
ALP SERPL-CCNC: 77 U/L (ref 30–99)
ALT SERPL-CCNC: 12 U/L (ref 2–50)
ANION GAP SERPL CALC-SCNC: 9 MMOL/L (ref 0–11.9)
AST SERPL-CCNC: 15 U/L (ref 12–45)
BILIRUB SERPL-MCNC: 0.5 MG/DL (ref 0.1–1.5)
BUN SERPL-MCNC: 13 MG/DL (ref 8–22)
CALCIUM SERPL-MCNC: 9.4 MG/DL (ref 8.5–10.5)
CHLORIDE SERPL-SCNC: 102 MMOL/L (ref 96–112)
CO2 SERPL-SCNC: 27 MMOL/L (ref 20–33)
CREAT SERPL-MCNC: 0.87 MG/DL (ref 0.5–1.4)
ERYTHROCYTE [DISTWIDTH] IN BLOOD BY AUTOMATED COUNT: 39.2 FL (ref 35.9–50)
GLOBULIN SER CALC-MCNC: 3.4 G/DL (ref 1.9–3.5)
GLUCOSE SERPL-MCNC: 196 MG/DL (ref 65–99)
HCT VFR BLD AUTO: 45.8 % (ref 37–47)
HGB BLD-MCNC: 15.7 G/DL (ref 12–16)
MCH RBC QN AUTO: 31.1 PG (ref 27–33)
MCHC RBC AUTO-ENTMCNC: 34.3 G/DL (ref 33.6–35)
MCV RBC AUTO: 90.7 FL (ref 81.4–97.8)
PLATELET # BLD AUTO: 271 K/UL (ref 164–446)
PMV BLD AUTO: 10.7 FL (ref 9–12.9)
POTASSIUM SERPL-SCNC: 3.9 MMOL/L (ref 3.6–5.5)
PROT SERPL-MCNC: 7.4 G/DL (ref 6–8.2)
RBC # BLD AUTO: 5.05 M/UL (ref 4.2–5.4)
SODIUM SERPL-SCNC: 138 MMOL/L (ref 135–145)
T4 FREE SERPL-MCNC: 0.82 NG/DL (ref 0.53–1.43)
TSH SERPL DL<=0.005 MIU/L-ACNC: 0.82 UIU/ML (ref 0.38–5.33)
WBC # BLD AUTO: 7.2 K/UL (ref 4.8–10.8)

## 2018-11-16 PROCEDURE — 84443 ASSAY THYROID STIM HORMONE: CPT

## 2018-11-16 PROCEDURE — 85027 COMPLETE CBC AUTOMATED: CPT

## 2018-11-16 PROCEDURE — 80053 COMPREHEN METABOLIC PANEL: CPT

## 2018-11-16 PROCEDURE — 84439 ASSAY OF FREE THYROXINE: CPT

## 2018-11-16 PROCEDURE — 36415 COLL VENOUS BLD VENIPUNCTURE: CPT

## 2018-12-31 DIAGNOSIS — E78.5 DYSLIPIDEMIA: ICD-10-CM

## 2018-12-31 RX ORDER — SIMVASTATIN 20 MG
20 TABLET ORAL EVERY EVENING
Qty: 90 TAB | Refills: 3 | Status: SHIPPED | OUTPATIENT
Start: 2018-12-31 | End: 2020-06-23 | Stop reason: SDUPTHER

## 2019-03-19 ENCOUNTER — OFFICE VISIT (OUTPATIENT)
Dept: MEDICAL GROUP | Facility: MEDICAL CENTER | Age: 64
End: 2019-03-19
Payer: MEDICARE

## 2019-03-19 VITALS
HEART RATE: 110 BPM | HEIGHT: 67 IN | TEMPERATURE: 98.5 F | SYSTOLIC BLOOD PRESSURE: 130 MMHG | WEIGHT: 158.6 LBS | RESPIRATION RATE: 14 BRPM | BODY MASS INDEX: 24.89 KG/M2 | OXYGEN SATURATION: 92 % | DIASTOLIC BLOOD PRESSURE: 74 MMHG

## 2019-03-19 DIAGNOSIS — E11.9 TYPE 2 DIABETES MELLITUS WITHOUT COMPLICATION, WITHOUT LONG-TERM CURRENT USE OF INSULIN (HCC): ICD-10-CM

## 2019-03-19 DIAGNOSIS — I10 ESSENTIAL HYPERTENSION: ICD-10-CM

## 2019-03-19 DIAGNOSIS — E78.5 DYSLIPIDEMIA: ICD-10-CM

## 2019-03-19 DIAGNOSIS — J01.00 ACUTE NON-RECURRENT MAXILLARY SINUSITIS: ICD-10-CM

## 2019-03-19 DIAGNOSIS — Z72.0 TOBACCO USE: ICD-10-CM

## 2019-03-19 LAB
HBA1C MFR BLD: 8.9 % (ref ?–5.8)
INT CON NEG: NORMAL
INT CON POS: NORMAL

## 2019-03-19 PROCEDURE — 99214 OFFICE O/P EST MOD 30 MIN: CPT | Performed by: FAMILY MEDICINE

## 2019-03-19 PROCEDURE — 83036 HEMOGLOBIN GLYCOSYLATED A1C: CPT | Performed by: FAMILY MEDICINE

## 2019-03-19 RX ORDER — DOXYCYCLINE HYCLATE 100 MG
100 TABLET ORAL 2 TIMES DAILY
Qty: 20 TAB | Refills: 0 | Status: SHIPPED | OUTPATIENT
Start: 2019-03-19 | End: 2019-03-29

## 2019-03-19 RX ORDER — GLIMEPIRIDE 2 MG/1
2 TABLET ORAL EVERY MORNING
Qty: 90 TAB | Refills: 1 | Status: SHIPPED | OUTPATIENT
Start: 2019-03-19 | End: 2019-09-04 | Stop reason: SDUPTHER

## 2019-03-19 NOTE — PROGRESS NOTES
cc:  Sinus congestions    Subjective:     Gia GUEVARA is a 63 y.o. female presenting for:    1.  Sinus congestion: Has been having worsening sinus congestion that started 4 days ago.  Associated with bilateral ear pain, sore throat, fatigue, cough, teeth pain, decreased appetite, fevers, nausea.  Denies any vomiting, chest pain, shortness of breath, abdominal pain, diarrhea, rashes.  Has tried nothing for this yet.  She continues to smoke tobacco, is not interested in quitting.  Her sister recently passed away and is stressed about this.    2. Diabetes, HTN, HLD:    --asa: usually takes 81mg daily, no stomach or stool issues.  --a1c:  Today is 8.9, was 8.1 11/2018.  Is currently taking metformin xr 1000mg BID.  Denies any hypoglycemic episodes.  No polyuria, polydipsia.   --BP: normal today.  Has been normotensive.  Is currently on hctz 12.5g daily and potassium.  Last CMP was 11/2018  --LDL: last checked 1/2018.  Is on zocor 20 mg daily.  No myalgias.  --tobacco: smoking, about 2-3 cig/day.    --eye exam: last exam 12/2016.  No vision changes.  Plans to make an appointment with her eye doctor soon.  --foot exam: last exam 4/2018.  Denies any foot, skin problems.  No numbness/tingling.  --microalbumin: last checked 4/2018.  No urinary symptoms.        Review of systems:  See above.       Current Outpatient Prescriptions:   •  doxycycline (VIBRAMYCIN) 100 MG Tab, Take 1 Tab by mouth 2 Times a Day for 10 days., Disp: 20 Tab, Rfl: 0  •  glimepiride (AMARYL) 2 MG Tab, Take 1 Tab by mouth every morning., Disp: 90 Tab, Rfl: 1  •  simvastatin (ZOCOR) 20 MG Tab, Take 1 Tab by mouth every evening., Disp: 90 Tab, Rfl: 3  •  ibuprofen (MOTRIN) 800 MG Tab, Take 1 Tab by mouth every 8 hours as needed for Moderate Pain., Disp: 60 Tab, Rfl: 0  •  metFORMIN ER (GLUCOPHAGE XR) 500 MG TABLET SR 24 HR, Take 2 Tabs by mouth 2 times a day., Disp: 360 Tab, Rfl: 1  •  hydroCHLOROthiazide (HYDRODIURIL) 12.5 MG tablet, Take 1 Tab  "by mouth every day., Disp: 30 Tab, Rfl: 6  •  methimazole (TAPAZOLE) 5 MG Tab, Take 1 Tab by mouth 3 times a day., Disp: 270 Tab, Rfl: 1  •  nortriptyline (PAMELOR) 25 MG Cap, Take 1 Cap by mouth every bedtime., Disp: 90 Cap, Rfl: 1  •  potassium chloride SA (KDUR) 20 MEQ Tab CR, Take 1 Tab by mouth 2 times a day., Disp: 60 Tab, Rfl: 6  •  Lancets Misc, Use 1x/day and PRN for signs and symptoms of high or low blood sugar. Dx E 11.65, Disp: 100 Each, Rfl: 3  •  Blood Glucose Monitoring Suppl Kit, Test blood sugar as recommended by provider.  Dx E 11.65, Disp: 1 Kit, Rfl: PRN  •  Blood Glucose Monitoring Suppl SUPPLIES Misc, For test strips: Use 1x/day and PRN for signs and symptoms of high or low blood sugar. Dx E 11.65, Disp: 100 Each, Rfl: 3  •  albuterol (PROVENTIL HFA) 108 (90 BASE) MCG/ACT Aero Soln inhalation aerosol, Inhale 2 Puffs by mouth every four hours as needed for Shortness of Breath., Disp: 1 Inhaler, Rfl: 3  •  nitroglycerin (NITROSTAT) 0.4 MG SL Tab, Place 1 Tab under tongue as needed for Chest Pain., Disp: 25 Tab, Rfl: 1  •  ASPIRIN 81 MG PO TBEC, Take  by mouth. qd , Disp: , Rfl:     Allergies, past medical history, past surgical history, family history, social history reviewed and updated    Objective:     Vitals: /74 (BP Location: Left arm, Patient Position: Sitting, BP Cuff Size: Adult)   Pulse (!) 110   Temp 36.9 °C (98.5 °F) (Temporal)   Resp 14   Ht 1.702 m (5' 7\")   Wt 71.9 kg (158 lb 9.6 oz)   SpO2 92%   BMI 24.84 kg/m²   General: Alert, pleasant, NAD  HEENT: Normocephalic.  EOMI, no icterus or pallor.  Conjunctivae and lids normal. External ears normal.  Tympanic membranes pearly, opaque on the left, erythematous on the right.  Nares patent, mucosa pink, drainage present.  Maxillary sinus tenderness.  Oropharynx non-erythematous, mucous membranes moist.  Neck supple.  No thyromegaly or masses palpated. No cervical or supraclavicular lymphadenopathy.  Heart: Regular rate and " rhythm.  S1 and S2 normal.  No murmurs appreciated.  Respiratory: Normal respiratory effort.  Clear to auscultation bilaterally.  Abdomen: Non-distended, soft  Skin: Warm, somewhat clammy  Musculoskeletal: Gait is normal.  Moves all extremities well.  Extremities: No leg edema.   Psych:  Affect/mood is normal, judgement is good, memory is intact, grooming is appropriate.    Assessment/Plan:     Gia was seen today for sinus problem.    Diagnoses and all orders for this visit:    Acute non-recurrent maxillary sinusitis  New problem.  We will start treatment with doxycycline.  Advised to cut down and quit on tobacco  -     doxycycline (VIBRAMYCIN) 100 MG Tab; Take 1 Tab by mouth 2 Times a Day for 10 days.    Type 2 diabetes mellitus without complication, without long-term current use of insulin (HCC)  Uncontrolled.  Discussed healthy diet and exercise, will start glimepiride.  Patient plans to make an eye appointment soon.  Follow-up in 3 months.  -     POCT Hemoglobin A1C  -     MICROALBUMIN CREAT RATIO URINE (LAB COLLECT); Future  -     Comp Metabolic Panel; Future  -     glimepiride (AMARYL) 2 MG Tab; Take 1 Tab by mouth every morning.    Essential hypertension  Stable, continue current medications  -     Comp Metabolic Panel; Future    Dyslipidemia  Stable, continue simvastatin  -     Lipid Profile; Future  -     Comp Metabolic Panel; Future    Tobacco use  Advised to quit, patient not interested      Return in about 3 months (around 6/19/2019) for Diabetes.

## 2019-06-11 DIAGNOSIS — I10 ESSENTIAL HYPERTENSION: ICD-10-CM

## 2019-06-11 RX ORDER — HYDROCHLOROTHIAZIDE 12.5 MG/1
12.5 TABLET ORAL DAILY
Qty: 90 TAB | Refills: 1 | Status: SHIPPED | OUTPATIENT
Start: 2019-06-11 | End: 2019-12-04 | Stop reason: SDUPTHER

## 2019-06-27 ENCOUNTER — HOSPITAL ENCOUNTER (OUTPATIENT)
Dept: LAB | Facility: MEDICAL CENTER | Age: 64
End: 2019-06-27
Attending: FAMILY MEDICINE
Payer: MEDICARE

## 2019-06-27 DIAGNOSIS — I10 ESSENTIAL HYPERTENSION: ICD-10-CM

## 2019-06-27 DIAGNOSIS — E78.5 DYSLIPIDEMIA: ICD-10-CM

## 2019-06-27 DIAGNOSIS — E11.9 TYPE 2 DIABETES MELLITUS WITHOUT COMPLICATION, WITHOUT LONG-TERM CURRENT USE OF INSULIN (HCC): ICD-10-CM

## 2019-06-27 LAB
ALBUMIN SERPL BCP-MCNC: 3.8 G/DL (ref 3.2–4.9)
ALBUMIN/GLOB SERPL: 1.3 G/DL
ALP SERPL-CCNC: 77 U/L (ref 30–99)
ALT SERPL-CCNC: 14 U/L (ref 2–50)
ANION GAP SERPL CALC-SCNC: 11 MMOL/L (ref 0–11.9)
AST SERPL-CCNC: 16 U/L (ref 12–45)
BILIRUB SERPL-MCNC: 0.5 MG/DL (ref 0.1–1.5)
BUN SERPL-MCNC: 11 MG/DL (ref 8–22)
CALCIUM SERPL-MCNC: 8.8 MG/DL (ref 8.5–10.5)
CHLORIDE SERPL-SCNC: 105 MMOL/L (ref 96–112)
CHOLEST SERPL-MCNC: 182 MG/DL (ref 100–199)
CO2 SERPL-SCNC: 26 MMOL/L (ref 20–33)
CREAT SERPL-MCNC: 0.95 MG/DL (ref 0.5–1.4)
CREAT UR-MCNC: 206.7 MG/DL
FASTING STATUS PATIENT QL REPORTED: NORMAL
GLOBULIN SER CALC-MCNC: 3 G/DL (ref 1.9–3.5)
GLUCOSE SERPL-MCNC: 131 MG/DL (ref 65–99)
HDLC SERPL-MCNC: 64 MG/DL
LDLC SERPL CALC-MCNC: 94 MG/DL
MICROALBUMIN UR-MCNC: <0.7 MG/DL
MICROALBUMIN/CREAT UR: NORMAL MG/G (ref 0–30)
POTASSIUM SERPL-SCNC: 3.8 MMOL/L (ref 3.6–5.5)
PROT SERPL-MCNC: 6.8 G/DL (ref 6–8.2)
SODIUM SERPL-SCNC: 142 MMOL/L (ref 135–145)
TRIGL SERPL-MCNC: 118 MG/DL (ref 0–149)

## 2019-06-27 PROCEDURE — 82570 ASSAY OF URINE CREATININE: CPT

## 2019-06-27 PROCEDURE — 82043 UR ALBUMIN QUANTITATIVE: CPT

## 2019-06-27 PROCEDURE — 80053 COMPREHEN METABOLIC PANEL: CPT

## 2019-06-27 PROCEDURE — 80061 LIPID PANEL: CPT

## 2019-06-27 PROCEDURE — 36415 COLL VENOUS BLD VENIPUNCTURE: CPT

## 2019-07-08 ENCOUNTER — OFFICE VISIT (OUTPATIENT)
Dept: MEDICAL GROUP | Facility: MEDICAL CENTER | Age: 64
End: 2019-07-08
Payer: MEDICARE

## 2019-07-08 VITALS
TEMPERATURE: 98.1 F | RESPIRATION RATE: 16 BRPM | OXYGEN SATURATION: 98 % | BODY MASS INDEX: 25.27 KG/M2 | HEART RATE: 76 BPM | DIASTOLIC BLOOD PRESSURE: 78 MMHG | SYSTOLIC BLOOD PRESSURE: 122 MMHG | WEIGHT: 161 LBS | HEIGHT: 67 IN

## 2019-07-08 DIAGNOSIS — I10 ESSENTIAL HYPERTENSION: ICD-10-CM

## 2019-07-08 DIAGNOSIS — Z12.31 ENCOUNTER FOR SCREENING MAMMOGRAM FOR MALIGNANT NEOPLASM OF BREAST: ICD-10-CM

## 2019-07-08 DIAGNOSIS — R07.9 CHEST PAIN, UNSPECIFIED TYPE: ICD-10-CM

## 2019-07-08 DIAGNOSIS — M65.20 CALCIFIC TENDINITIS: ICD-10-CM

## 2019-07-08 DIAGNOSIS — E78.5 DYSLIPIDEMIA: ICD-10-CM

## 2019-07-08 DIAGNOSIS — Z72.0 TOBACCO USE: ICD-10-CM

## 2019-07-08 DIAGNOSIS — E11.9 TYPE 2 DIABETES MELLITUS WITHOUT COMPLICATION, WITHOUT LONG-TERM CURRENT USE OF INSULIN (HCC): ICD-10-CM

## 2019-07-08 LAB
HBA1C MFR BLD: 7.2 % (ref 0–5.6)
INT CON NEG: ABNORMAL
INT CON POS: ABNORMAL

## 2019-07-08 PROCEDURE — 93000 ELECTROCARDIOGRAM COMPLETE: CPT | Performed by: FAMILY MEDICINE

## 2019-07-08 PROCEDURE — 99214 OFFICE O/P EST MOD 30 MIN: CPT | Mod: 25 | Performed by: FAMILY MEDICINE

## 2019-07-08 PROCEDURE — 83036 HEMOGLOBIN GLYCOSYLATED A1C: CPT | Performed by: FAMILY MEDICINE

## 2019-07-08 RX ORDER — IBUPROFEN 200 MG
400 TABLET ORAL NIGHTLY PRN
COMMUNITY
Start: 2019-07-08 | End: 2021-07-30

## 2019-07-08 ASSESSMENT — PATIENT HEALTH QUESTIONNAIRE - PHQ9: CLINICAL INTERPRETATION OF PHQ2 SCORE: 0

## 2019-07-08 NOTE — PROGRESS NOTES
cc:  Chest pain    Subjective:     Gia Coronel is a 63 y.o. female presenting for:      1. Chest pain: Has been having intermittent left sided chest and mid back discomfort for the past several weeks.  Describes it as a pulling sensation that does not radiate.  Associated with nothing.  Symptoms are worse with movement.  Denies any nausea, vomiting, shortness of breath, lightheadedness, dizziness, leg swelling, numbness, tingling, weakness, injuries, falls, change in activity.  She has been using ibuprofen 400 mg at night, which has been helpful.    2. Diabetes, HLD:    --asa: takes 81mg daily usually, but has stopped due to her ibuprofen use  --a1c:  Today is 7.2, was 8.9 3/2019 .  Is currently taking glimepiride 2mg daily, metformin xr 1000mg BID.  Denies any hypoglycemic episodes.  No polyuria, polydipsia. Has been trying to eat healthier.  --LDL: last checked 6/2019.  Is on zocor 20 mg daily.  No myalgias.  --tobacco: smoking, about 1-2 cig/day. is not interested in quitting gyet  --eye exam: last exam 12/2016.  No vision changes.  Declines a retinal screen or referral as she does not think that she can afford the copays  --foot exam: last exam 4/2018.  Denies any foot, skin problems.  No numbness/tingling.  --microalbumin: last checked 6/2019.  No urinary symptoms.       3. Hypertension: has a hx of arterial hypertension, has been stable.  Is currently on hctz 12.5mg daily.  Denies any side effects.  Denies any shortness of breath, lightheadedness, dizziness, headaches, leg swelling.  · BP today at goal: yes   · BPs at home: not checked  · Sodium intake: discussed   · DASH diet: discussed   · Physical activity: minimal   · Body mass index is 25.22 kg/m².,  · Alcohol use: rare   · Tobacco use: smoker  The 10-year ASCVD risk score (Du Pontisabelle CERON Jr., et al., 2013) is: 23.3%    Values used to calculate the score:      Age: 63 years      Sex: Female      Is Non- : Yes      Diabetic:  Yes      Tobacco smoker: Yes      Systolic Blood Pressure: 122 mmHg      Is BP treated: Yes      HDL Cholesterol: 64 mg/dL  ·     Total Cholesterol: 182 mg/dL    · Is also on a baby aspirin and simvastatin 20mg daily.    · Last CBC: 2018  · Last eGFR: >60 on 2019  · Last fasting glucose or a1c: today  · Last lipid panel: 2019  · Last microalbumin: 2019   · Last EK2018  · Last echocardiogram:       Review of systems:  See above.       Current Outpatient Prescriptions:   •  ibuprofen (MOTRIN) 200 MG Tab, Take 2 Tabs by mouth at bedtime as needed., Disp: , Rfl:   •  hydroCHLOROthiazide (HYDRODIURIL) 12.5 MG tablet, Take 1 Tab by mouth every day., Disp: 90 Tab, Rfl: 1  •  glimepiride (AMARYL) 2 MG Tab, Take 1 Tab by mouth every morning., Disp: 90 Tab, Rfl: 1  •  simvastatin (ZOCOR) 20 MG Tab, Take 1 Tab by mouth every evening., Disp: 90 Tab, Rfl: 3  •  metFORMIN ER (GLUCOPHAGE XR) 500 MG TABLET SR 24 HR, Take 2 Tabs by mouth 2 times a day., Disp: 360 Tab, Rfl: 1  •  methimazole (TAPAZOLE) 5 MG Tab, Take 1 Tab by mouth 3 times a day., Disp: 270 Tab, Rfl: 1  •  nortriptyline (PAMELOR) 25 MG Cap, Take 1 Cap by mouth every bedtime., Disp: 90 Cap, Rfl: 1  •  potassium chloride SA (KDUR) 20 MEQ Tab CR, Take 1 Tab by mouth 2 times a day., Disp: 60 Tab, Rfl: 6  •  Lancets Misc, Use 1x/day and PRN for signs and symptoms of high or low blood sugar. Dx E 11.65, Disp: 100 Each, Rfl: 3  •  Blood Glucose Monitoring Suppl Kit, Test blood sugar as recommended by provider.  Dx E 11.65, Disp: 1 Kit, Rfl: PRN  •  Blood Glucose Monitoring Suppl SUPPLIES Misc, For test strips: Use 1x/day and PRN for signs and symptoms of high or low blood sugar. Dx E 11.65, Disp: 100 Each, Rfl: 3  •  albuterol (PROVENTIL HFA) 108 (90 BASE) MCG/ACT Aero Soln inhalation aerosol, Inhale 2 Puffs by mouth every four hours as needed for Shortness of Breath., Disp: 1 Inhaler, Rfl: 3  •  nitroglycerin (NITROSTAT) 0.4 MG SL Tab, Place 1 Tab under  "tongue as needed for Chest Pain., Disp: 25 Tab, Rfl: 1  •  ASPIRIN 81 MG PO TBEC, Take  by mouth. qd , Disp: , Rfl:     Allergies, past medical history, past surgical history, family history, social history reviewed and updated    Objective:     Vitals: /78 (BP Location: Right arm, Patient Position: Sitting)   Pulse 76   Temp 36.7 °C (98.1 °F) (Temporal)   Resp 16   Ht 1.702 m (5' 7\")   Wt 73 kg (161 lb)   SpO2 98%   BMI 25.22 kg/m²   General: Alert, pleasant, NAD  HEENT: Normocephalic.  EOMI, no icterus or pallor.  Conjunctivae and lids normal. External ears normal. Oropharynx non-erythematous, mucous membranes moist.  Neck supple.  No thyromegaly or masses palpated. No cervical or supraclavicular lymphadenopathy.  Heart: Regular rate and rhythm.  S1 and S2 normal.  No murmurs appreciated.  No chest wall tenderness  Respiratory: Normal respiratory effort.  Clear to auscultation bilaterally.  Abdomen: Non-distended, soft  Skin: Warm, dry, no rashes.  Keloid on anterior chest  Musculoskeletal: Gait is normal.  Moves all extremities well.  Extremities: Distal pulses 2+ symmetric.  No edema, skin lesions; toe nails clean.  Normal monofilament.  Achilles reflexes 2+ symmetric.  Psych:  Affect/mood is normal, judgement is good, memory is intact, grooming is appropriate.    EKG: Sinus rhythm.  Heart rate 71 bpm.  Occasional PVCs.  Normal axis, intervals, no ST segment changes.    Assessment/Plan:     Diagnoses and all orders for this visit:    Chest pain, unspecified type  New problem.  We discussed doing a stress test, however she is not sure if she can afford the co-pays.  We also discussed other potential etiologies including gallbladder, pancreatic issues.  We will check the following labs, chest x-ray, right upper quadrant ultrasound also ordered.  We discussed signs and symptoms to watch for, reasons to go the ED  -     EKG  -     CBC WITHOUT DIFFERENTIAL; Future  -     Comp Metabolic Panel; Future  -   "   LIPASE; Future  -     DX-CHEST-2 VIEWS; Future  -     NM-CARDIAC STRESS TEST; Future  -     US-RUQ; Future    Type 2 diabetes mellitus without complication, without long-term current use of insulin (HCC)  Stable, improved.  Continue current medications.  -     POCT Hemoglobin A1C  -     Diabetic Monofilament Lower Extremity Exam  -     CBC WITHOUT DIFFERENTIAL; Future  -     Comp Metabolic Panel; Future    Essential hypertension  Stable, continue hydrochlorothiazide  -     CBC WITHOUT DIFFERENTIAL; Future  -     Comp Metabolic Panel; Future  -     DX-CHEST-2 VIEWS; Future    Dyslipidemia  Stable, continue simvastatin    Tobacco use  Advised to quit, patient is not interested  -     DX-CHEST-2 VIEWS; Future    Encounter for screening mammogram for malignant neoplasm of breast  -     MA-SCREENING MAMMO BILAT W/TOMOSYNTHESIS W/CAD; Future      She declines a colonoscopy due to cost      Return in about 3 months (around 10/8/2019) for routine follow up, Diabetes (NO RN appt).

## 2019-09-04 RX ORDER — GLIMEPIRIDE 2 MG/1
2 TABLET ORAL EVERY MORNING
Qty: 90 TAB | Refills: 1 | Status: SHIPPED | OUTPATIENT
Start: 2019-09-04 | End: 2020-03-01

## 2019-10-08 ENCOUNTER — OFFICE VISIT (OUTPATIENT)
Dept: MEDICAL GROUP | Facility: MEDICAL CENTER | Age: 64
End: 2019-10-08
Payer: MEDICARE

## 2019-10-08 VITALS
BODY MASS INDEX: 25.57 KG/M2 | WEIGHT: 162.92 LBS | OXYGEN SATURATION: 96 % | DIASTOLIC BLOOD PRESSURE: 78 MMHG | HEIGHT: 67 IN | TEMPERATURE: 97 F | HEART RATE: 89 BPM | SYSTOLIC BLOOD PRESSURE: 118 MMHG

## 2019-10-08 DIAGNOSIS — E78.5 DYSLIPIDEMIA: ICD-10-CM

## 2019-10-08 DIAGNOSIS — E11.9 TYPE 2 DIABETES MELLITUS WITHOUT COMPLICATION, WITHOUT LONG-TERM CURRENT USE OF INSULIN (HCC): ICD-10-CM

## 2019-10-08 DIAGNOSIS — Z72.0 TOBACCO USE: ICD-10-CM

## 2019-10-08 DIAGNOSIS — E05.00 GRAVES' DISEASE: ICD-10-CM

## 2019-10-08 DIAGNOSIS — I10 ESSENTIAL HYPERTENSION: ICD-10-CM

## 2019-10-08 LAB
HBA1C MFR BLD: 6.9 % (ref 0–5.6)
INT CON NEG: NEGATIVE
INT CON POS: POSITIVE

## 2019-10-08 PROCEDURE — 99214 OFFICE O/P EST MOD 30 MIN: CPT | Performed by: FAMILY MEDICINE

## 2019-10-08 PROCEDURE — 83036 HEMOGLOBIN GLYCOSYLATED A1C: CPT | Performed by: FAMILY MEDICINE

## 2019-10-08 SDOH — HEALTH STABILITY: MENTAL HEALTH: HOW MANY STANDARD DRINKS CONTAINING ALCOHOL DO YOU HAVE ON A TYPICAL DAY?: 1 OR 2

## 2019-10-08 SDOH — HEALTH STABILITY: MENTAL HEALTH: HOW OFTEN DO YOU HAVE 6 OR MORE DRINKS ON ONE OCCASION?: NEVER

## 2019-10-08 SDOH — HEALTH STABILITY: MENTAL HEALTH: HOW OFTEN DO YOU HAVE A DRINK CONTAINING ALCOHOL?: MONTHLY OR LESS

## 2019-10-08 NOTE — PROGRESS NOTES
cc:  diabetes    Subjective:     Gia Coronel is a 63 y.o. female presenting for:      1. Diabetes, HLD:    --asa: takes 81mg daily, no stomach or stool issues  --a1c:  Today is 6.9, was 7.2 2019 .  Is currently taking glimepiride 2mg daily, metformin xr 1000mg BID.  Denies any hypoglycemic episodes.  No polyuria, polydipsia. Has been trying to eat healthier but admits to eating more cheesecake lately  --LDL: last checked 2019.  Is on zocor 20 mg daily.  No myalgias.  --tobacco: smoking, about 3 cig/day. is not interested in quitting yet  --eye exam: last exam 2016.  No vision changes.  Declines a retinal screen or referral, still having trouble with copays  --foot exam: last exam 2019.  Denies any foot, skin problems.  No numbness/tingling.  --microalbumin: last checked 2019.  No urinary symptoms.       2. Hypertension: has a hx of arterial hypertension, has been stable.  Is currently on hctz 12.5mg daily.  Denies any side effects.  Denies any shortness of breath, lightheadedness, dizziness, headaches, leg swelling.  · BP today at goal: yes   · BPs at home: not checked  · Sodium intake: discussed   · DASH diet: discussed   · Physical activity: minimal, discussed  · Body mass index is 25.52 kg/m².,  · Alcohol use: rare   · Tobacco use: smoker  The 10-year ASCVD risk score (Edisabelle CERON Jr., et al., 2013) is: 23%    Values used to calculate the score:      Age: 64 years      Sex: Female      Is Non- : Yes      Diabetic: Yes      Tobacco smoker: Yes      Systolic Blood Pressure: 118 mmHg      Is BP treated: Yes      HDL Cholesterol: 64 mg/dL  ·     Total Cholesterol: 182 mg/dL    · Is also on a baby aspirin and simvastatin 20mg daily.    · Last CBC: 2018  · Last eGFR: >60 on 2019  · Last fasting glucose or a1c: today  · Last lipid panel: 2019  · Last microalbumin: 2019   · Last EK2018  · Last echocardiogram:       Review of systems:  See above.       Current  "Outpatient Medications:   •  glimepiride (AMARYL) 2 MG Tab, Take 1 Tab by mouth every morning., Disp: 90 Tab, Rfl: 1  •  ibuprofen (MOTRIN) 200 MG Tab, Take 2 Tabs by mouth at bedtime as needed., Disp: , Rfl:   •  hydroCHLOROthiazide (HYDRODIURIL) 12.5 MG tablet, Take 1 Tab by mouth every day., Disp: 90 Tab, Rfl: 1  •  simvastatin (ZOCOR) 20 MG Tab, Take 1 Tab by mouth every evening., Disp: 90 Tab, Rfl: 3  •  metFORMIN ER (GLUCOPHAGE XR) 500 MG TABLET SR 24 HR, Take 2 Tabs by mouth 2 times a day., Disp: 360 Tab, Rfl: 1  •  methimazole (TAPAZOLE) 5 MG Tab, Take 1 Tab by mouth 3 times a day., Disp: 270 Tab, Rfl: 1  •  nortriptyline (PAMELOR) 25 MG Cap, Take 1 Cap by mouth every bedtime., Disp: 90 Cap, Rfl: 1  •  potassium chloride SA (KDUR) 20 MEQ Tab CR, Take 1 Tab by mouth 2 times a day., Disp: 60 Tab, Rfl: 6  •  Lancets Misc, Use 1x/day and PRN for signs and symptoms of high or low blood sugar. Dx E 11.65, Disp: 100 Each, Rfl: 3  •  Blood Glucose Monitoring Suppl Kit, Test blood sugar as recommended by provider.  Dx E 11.65, Disp: 1 Kit, Rfl: PRN  •  Blood Glucose Monitoring Suppl SUPPLIES Misc, For test strips: Use 1x/day and PRN for signs and symptoms of high or low blood sugar. Dx E 11.65, Disp: 100 Each, Rfl: 3  •  albuterol (PROVENTIL HFA) 108 (90 BASE) MCG/ACT Aero Soln inhalation aerosol, Inhale 2 Puffs by mouth every four hours as needed for Shortness of Breath., Disp: 1 Inhaler, Rfl: 3  •  nitroglycerin (NITROSTAT) 0.4 MG SL Tab, Place 1 Tab under tongue as needed for Chest Pain., Disp: 25 Tab, Rfl: 1  •  ASPIRIN 81 MG PO TBEC, Take  by mouth. qd , Disp: , Rfl:     Allergies, past medical history, past surgical history, family history, social history reviewed and updated    Objective:     Vitals: /78   Pulse 89   Temp 36.1 °C (97 °F)   Ht 1.702 m (5' 7\")   Wt 73.9 kg (162 lb 14.7 oz)   SpO2 96%   BMI 25.52 kg/m²   General: Alert, pleasant, NAD  HEENT: Normocephalic.  EOMI, no icterus or pallor. "  Conjunctivae and lids normal. External ears normal. Oropharynx non-erythematous, mucous membranes moist.  Neck supple.  No thyromegaly or masses palpated. No cervical or supraclavicular lymphadenopathy.  Heart: Regular rate and rhythm.  S1 and S2 normal.  No murmurs appreciated.  Respiratory: Normal respiratory effort.  Clear to auscultation bilaterally.  Abdomen: Non-distended, soft  Skin: Warm, dry, no rashes.  Musculoskeletal: Gait is normal.  Moves all extremities well.  Extremities: No leg edema.  Psych:  Affect/mood is normal, judgement is good, memory is intact, grooming is appropriate.    Assessment/Plan:     Gia was seen today for diabetes.    Diagnoses and all orders for this visit:    Type 2 diabetes mellitus without complication, without long-term current use of insulin (HCC)  Stable, well controlled.  Discussed healthy diet and exercise.  Continue current medications.  Follow-up in 6 months.  -     POCT Hemoglobin A1C    Dyslipidemia  Stable, continue simvastatin    Essential hypertension  Stable, continue hydrochlorothiazide    Graves' disease  Possibly stable, recommended that she follow-up with her endocrinologist.  TSH and T4 ordered  -     TSH; Future  -     FREE THYROXINE; Future    Tobacco use  Advised to quit, patient is not interested      Return in about 6 months (around 4/8/2020) for routine follow up.

## 2019-11-13 ENCOUNTER — HOSPITAL ENCOUNTER (OUTPATIENT)
Dept: RADIOLOGY | Facility: MEDICAL CENTER | Age: 64
End: 2019-11-13
Attending: FAMILY MEDICINE
Payer: MEDICARE

## 2019-11-13 DIAGNOSIS — Z12.31 ENCOUNTER FOR SCREENING MAMMOGRAM FOR MALIGNANT NEOPLASM OF BREAST: ICD-10-CM

## 2019-11-13 PROCEDURE — 77063 BREAST TOMOSYNTHESIS BI: CPT

## 2019-12-04 DIAGNOSIS — I10 ESSENTIAL HYPERTENSION: ICD-10-CM

## 2019-12-04 RX ORDER — HYDROCHLOROTHIAZIDE 12.5 MG/1
TABLET ORAL
Qty: 90 TAB | Refills: 1 | Status: SHIPPED | OUTPATIENT
Start: 2019-12-04 | End: 2020-06-22

## 2019-12-11 ENCOUNTER — TELEPHONE (OUTPATIENT)
Dept: MEDICAL GROUP | Facility: MEDICAL CENTER | Age: 64
End: 2019-12-11

## 2019-12-11 NOTE — TELEPHONE ENCOUNTER
1. Caller Name: Gia Farmerpricilla                      Call Back Number: [phone] (home)     2. Message: pt called she needs a new Letter for a  dog she had one but its gone    3. Patient approves office to leave a detailed voicemail/MyChart message: yes

## 2020-01-31 ENCOUNTER — HOSPITAL ENCOUNTER (OUTPATIENT)
Dept: LAB | Facility: MEDICAL CENTER | Age: 65
End: 2020-01-31
Attending: FAMILY MEDICINE
Payer: MEDICARE

## 2020-01-31 DIAGNOSIS — R07.9 CHEST PAIN, UNSPECIFIED TYPE: ICD-10-CM

## 2020-01-31 DIAGNOSIS — E11.9 TYPE 2 DIABETES MELLITUS WITHOUT COMPLICATION, WITHOUT LONG-TERM CURRENT USE OF INSULIN (HCC): ICD-10-CM

## 2020-01-31 DIAGNOSIS — I10 ESSENTIAL HYPERTENSION: ICD-10-CM

## 2020-01-31 DIAGNOSIS — E05.00 GRAVES' DISEASE: ICD-10-CM

## 2020-01-31 LAB
ALBUMIN SERPL BCP-MCNC: 3.9 G/DL (ref 3.2–4.9)
ALBUMIN/GLOB SERPL: 1.1 G/DL
ALP SERPL-CCNC: 75 U/L (ref 30–99)
ALT SERPL-CCNC: 14 U/L (ref 2–50)
ANION GAP SERPL CALC-SCNC: 8 MMOL/L (ref 0–11.9)
AST SERPL-CCNC: 12 U/L (ref 12–45)
BILIRUB SERPL-MCNC: 0.5 MG/DL (ref 0.1–1.5)
BUN SERPL-MCNC: 19 MG/DL (ref 8–22)
CALCIUM SERPL-MCNC: 9.8 MG/DL (ref 8.5–10.5)
CHLORIDE SERPL-SCNC: 105 MMOL/L (ref 96–112)
CO2 SERPL-SCNC: 27 MMOL/L (ref 20–33)
CREAT SERPL-MCNC: 0.92 MG/DL (ref 0.5–1.4)
ERYTHROCYTE [DISTWIDTH] IN BLOOD BY AUTOMATED COUNT: 39.3 FL (ref 35.9–50)
GLOBULIN SER CALC-MCNC: 3.5 G/DL (ref 1.9–3.5)
GLUCOSE SERPL-MCNC: 127 MG/DL (ref 65–99)
HCT VFR BLD AUTO: 46 % (ref 37–47)
HGB BLD-MCNC: 15.8 G/DL (ref 12–16)
LIPASE SERPL-CCNC: 25 U/L (ref 11–82)
MCH RBC QN AUTO: 31.2 PG (ref 27–33)
MCHC RBC AUTO-ENTMCNC: 34.3 G/DL (ref 33.6–35)
MCV RBC AUTO: 90.9 FL (ref 81.4–97.8)
PLATELET # BLD AUTO: 253 K/UL (ref 164–446)
PMV BLD AUTO: 10.4 FL (ref 9–12.9)
POTASSIUM SERPL-SCNC: 3.4 MMOL/L (ref 3.6–5.5)
PROT SERPL-MCNC: 7.4 G/DL (ref 6–8.2)
RBC # BLD AUTO: 5.06 M/UL (ref 4.2–5.4)
SODIUM SERPL-SCNC: 140 MMOL/L (ref 135–145)
T4 FREE SERPL-MCNC: 1 NG/DL (ref 0.53–1.43)
TSH SERPL DL<=0.005 MIU/L-ACNC: 0.09 UIU/ML (ref 0.38–5.33)
WBC # BLD AUTO: 6.2 K/UL (ref 4.8–10.8)

## 2020-01-31 PROCEDURE — 84439 ASSAY OF FREE THYROXINE: CPT

## 2020-01-31 PROCEDURE — 84443 ASSAY THYROID STIM HORMONE: CPT

## 2020-01-31 PROCEDURE — 83690 ASSAY OF LIPASE: CPT

## 2020-01-31 PROCEDURE — 85027 COMPLETE CBC AUTOMATED: CPT

## 2020-01-31 PROCEDURE — 80053 COMPREHEN METABOLIC PANEL: CPT

## 2020-01-31 PROCEDURE — 36415 COLL VENOUS BLD VENIPUNCTURE: CPT

## 2020-02-28 ENCOUNTER — TELEPHONE (OUTPATIENT)
Dept: MEDICAL GROUP | Facility: MEDICAL CENTER | Age: 65
End: 2020-02-28

## 2020-02-28 RX ORDER — AMOXICILLIN AND CLAVULANATE POTASSIUM 875; 125 MG/1; MG/1
1 TABLET, FILM COATED ORAL 2 TIMES DAILY
Qty: 14 TAB | Refills: 0 | Status: SHIPPED | OUTPATIENT
Start: 2020-02-28 | End: 2020-03-06

## 2020-02-28 NOTE — TELEPHONE ENCOUNTER
Pt called asking for an antibiotic for her sinus infection she has had for 3 weeks..    Please advise

## 2020-03-01 RX ORDER — GLIMEPIRIDE 2 MG/1
2 TABLET ORAL EVERY MORNING
Qty: 90 TAB | Refills: 3 | Status: SHIPPED | OUTPATIENT
Start: 2020-03-01 | End: 2020-06-22 | Stop reason: SDUPTHER

## 2020-03-12 ENCOUNTER — TELEPHONE (OUTPATIENT)
Dept: MEDICAL GROUP | Facility: MEDICAL CENTER | Age: 65
End: 2020-03-12

## 2020-03-13 ENCOUNTER — OFFICE VISIT (OUTPATIENT)
Dept: MEDICAL GROUP | Facility: MEDICAL CENTER | Age: 65
End: 2020-03-13
Payer: MEDICARE

## 2020-03-13 VITALS
DIASTOLIC BLOOD PRESSURE: 70 MMHG | BODY MASS INDEX: 24.96 KG/M2 | RESPIRATION RATE: 16 BRPM | TEMPERATURE: 97.6 F | WEIGHT: 159 LBS | OXYGEN SATURATION: 96 % | HEART RATE: 74 BPM | SYSTOLIC BLOOD PRESSURE: 114 MMHG | HEIGHT: 67 IN

## 2020-03-13 DIAGNOSIS — E05.00 GRAVES' DISEASE: ICD-10-CM

## 2020-03-13 DIAGNOSIS — I10 ESSENTIAL HYPERTENSION: ICD-10-CM

## 2020-03-13 DIAGNOSIS — E11.9 TYPE 2 DIABETES MELLITUS WITHOUT COMPLICATION, WITHOUT LONG-TERM CURRENT USE OF INSULIN (HCC): ICD-10-CM

## 2020-03-13 DIAGNOSIS — E78.5 DYSLIPIDEMIA: ICD-10-CM

## 2020-03-13 DIAGNOSIS — Z72.0 TOBACCO USE: ICD-10-CM

## 2020-03-13 LAB
HBA1C MFR BLD: 8.3 % (ref 0–5.6)
INT CON NEG: NEGATIVE
INT CON POS: POSITIVE

## 2020-03-13 PROCEDURE — 83036 HEMOGLOBIN GLYCOSYLATED A1C: CPT | Mod: QW | Performed by: FAMILY MEDICINE

## 2020-03-13 PROCEDURE — 99214 OFFICE O/P EST MOD 30 MIN: CPT | Performed by: FAMILY MEDICINE

## 2020-03-13 ASSESSMENT — FIBROSIS 4 INDEX: FIB4 SCORE: 0.79

## 2020-03-13 ASSESSMENT — PATIENT HEALTH QUESTIONNAIRE - PHQ9: CLINICAL INTERPRETATION OF PHQ2 SCORE: 0

## 2020-03-13 NOTE — PROGRESS NOTES
cc:  diabetes    Subjective:     Gia Coronel is a 63 y.o. female presenting for:      1. Diabetes, HLD:    --asa: takes 81mg daily, no stomach or stool issues  --a1c:  Today is 8.3, was 6.9 10/2019 .  Is currently taking glimepiride 2 mg daily.  She stopped her metformin as she thought she was supposed to stop it since she started the glimepiride.  Denies any hypoglycemic episodes.  No polyuria, polydipsia.  She has also been eating more as she recently got new upper and partial lower dentures.  Is quite happy with her new teeth.  --LDL: last checked 6/2019.  Is on zocor 20 mg daily.  No myalgias.  --tobacco: smoking, about 3 cig/day. is not interested in quitting yet  --eye exam: last exam 12/2016.  No vision changes.  Declines a retinal screen or referral, still having trouble with copays  --foot exam: last exam 7/2019.  Denies any foot, skin problems.  No numbness/tingling.  --microalbumin: last checked 6/2019.  No urinary symptoms.       2. Hypertension: has a hx of arterial hypertension, has been stable.  Is currently on hctz 12.5mg daily.  Denies any side effects.  Denies any shortness of breath, lightheadedness, dizziness, headaches, leg swelling.  · BP today at goal: yes   · BPs at home: not checked  · Sodium intake: discussed   · DASH diet: discussed   · Physical activity: minimal, discussed  · Body mass index is 24.9 kg/m².,  · Alcohol use: rare   · Tobacco use: smoker, is not interested in quitting  The 10-year ASCVD risk score (Ed JIE Jr., et al., 2013) is: 21.2%    Values used to calculate the score:      Age: 64 years      Sex: Female      Is Non- : Yes      Diabetic: Yes      Tobacco smoker: Yes      Systolic Blood Pressure: 114 mmHg      Is BP treated: Yes      HDL Cholesterol: 64 mg/dL  ·     Total Cholesterol: 182 mg/dL    · Is also on a baby aspirin and simvastatin 20mg daily.    · Last CBC: 1/2020  · Last eGFR: >60 on 1/2020  · Last fasting glucose or a1c:  "today  · Last lipid panel: 2019  · Last microalbumin: 2019   · Last EK2018  · Last echocardiogram:       Review of systems:  See above.       Current Outpatient Medications:   •  glimepiride (AMARYL) 2 MG Tab, Take 1 Tab by mouth every morning., Disp: 90 Tab, Rfl: 3  •  hydroCHLOROthiazide (HYDRODIURIL) 12.5 MG tablet, TAKE 1 TABLET BY MOUTH ONCE DAILY, Disp: 90 Tab, Rfl: 1  •  ibuprofen (MOTRIN) 200 MG Tab, Take 2 Tabs by mouth at bedtime as needed., Disp: , Rfl:   •  simvastatin (ZOCOR) 20 MG Tab, Take 1 Tab by mouth every evening., Disp: 90 Tab, Rfl: 3  •  metFORMIN ER (GLUCOPHAGE XR) 500 MG TABLET SR 24 HR, Take 2 Tabs by mouth 2 times a day., Disp: 360 Tab, Rfl: 1  •  methimazole (TAPAZOLE) 5 MG Tab, Take 1 Tab by mouth 3 times a day., Disp: 270 Tab, Rfl: 1  •  Lancets Misc, Use 1x/day and PRN for signs and symptoms of high or low blood sugar. Dx E 11.65, Disp: 100 Each, Rfl: 3  •  Blood Glucose Monitoring Suppl Kit, Test blood sugar as recommended by provider.  Dx E 11.65, Disp: 1 Kit, Rfl: PRN  •  Blood Glucose Monitoring Suppl SUPPLIES Misc, For test strips: Use 1x/day and PRN for signs and symptoms of high or low blood sugar. Dx E 11.65, Disp: 100 Each, Rfl: 3  •  albuterol (PROVENTIL HFA) 108 (90 BASE) MCG/ACT Aero Soln inhalation aerosol, Inhale 2 Puffs by mouth every four hours as needed for Shortness of Breath., Disp: 1 Inhaler, Rfl: 3  •  nitroglycerin (NITROSTAT) 0.4 MG SL Tab, Place 1 Tab under tongue as needed for Chest Pain., Disp: 25 Tab, Rfl: 1  •  ASPIRIN 81 MG PO TBEC, Take  by mouth. qd , Disp: , Rfl:     Allergies, past medical history, past surgical history, family history, social history reviewed and updated    Objective:     Vitals: /70   Pulse 74   Temp 36.4 °C (97.6 °F)   Resp 16   Ht 1.702 m (5' 7\")   Wt 72.1 kg (159 lb)   SpO2 96%   BMI 24.90 kg/m²   General: Alert, pleasant, NAD  HEENT: Normocephalic.    Psych:  Affect/mood is normal, judgement is good, " memory is intact, grooming is appropriate.    Assessment/Plan:     Gia was seen today for follow-up.    Diagnoses and all orders for this visit:    Type 2 diabetes mellitus without complication, without long-term current use of insulin (HCC)  Uncontrolled.  We discussed her medications, she plans to resume her metformin.  Her A1c's were well controlled in the past so we will make no other medication changes.  Follow-up in 3 months.  -     POCT Hemoglobin A1C    Graves' disease  Recent TSH was low, she continues on the methimazole.  She has not followed up with endocrinology in over a year as her previous provider left, new referral placed.  -     REFERRAL TO ENDOCRINOLOGY    Essential hypertension  Stable, continue current medications    Dyslipidemia  Stable, continue simvastatin    Tobacco use  Advised to quit, patient is not interested        Return in about 3 months (around 6/13/2020) for routine follow up.

## 2020-05-18 RX ORDER — HYDROCHLOROTHIAZIDE 12.5 MG/1
12.5 CAPSULE, GELATIN COATED ORAL DAILY
Qty: 90 CAP | Refills: 3 | Status: SHIPPED | OUTPATIENT
Start: 2020-05-18 | End: 2021-07-06

## 2020-06-18 NOTE — PROGRESS NOTES
cc:  diabetes    Subjective:     Gia Coronel is a 64 y.o. female presenting for:      1. Diabetes, HLD:    --asa: takes 81mg daily, no stomach or stool issues  --a1c:  Today is 8.0, was 8.3 3/2020.  Is currently taking glimepiride 2 mg daily, metformin ER 1000mg BID.  Denies any hypoglycemic episodes.  No polyuria, polydipsia.    She has been exercising more, walking her dog more frequently.  However, she has not been watching her diet.  --LDL: last checked 6/2019.  Is on zocor 20 mg daily.  No myalgias.  --tobacco: smoking, about 2-3 cig/day. is working on cutting down and quitting.  She got new dentures.  --eye exam: last exam 12/2016.  No vision changes.  Declines a retinal screen or referral, still having trouble with copays  --foot exam: last exam 7/2019.  No numbness/tingling.  She has noticed a white patch on her right big toe that is been stable for the past 2 months.  Denies any pain, redness, swelling  --microalbumin: last checked 6/2019.  No urinary symptoms.       2. Hypertension: has a hx of arterial hypertension, has been stable.  Is currently on hctz 12.5mg daily.  Denies any side effects.  Denies any shortness of breath, lightheadedness, dizziness, headaches, leg swelling.  · BP today at goal: yes   · BPs at home: not checked  · Sodium intake: discussed   · DASH diet: discussed   · Physical activity: Walking her dog, discussed  Body mass index is 24.59 kg/m².  · Alcohol use: rare   · Tobacco use: smoker, is trying to cut down  · The 10-year ASCVD risk score (Cool JIE Jr., et al., 2013) is: 21.2%  ·   Values used to calculate the score:  ·     Age: 64 years  ·     Sex: Female  ·     Is Non- : Yes  ·     Diabetic: Yes  ·     Tobacco smoker: Yes  ·     Systolic Blood Pressure: 114 mmHg  ·     Is BP treated: Yes  ·     HDL Cholesterol: 64 mg/dL  ·     Total Cholesterol: 182 mg/dL    ? Is also on a baby aspirin and simvastatin 20mg daily.     · Last CBC: 1/2020  · Last  eGFR: >60 on 2020  · Last fasting glucose or a1c: today  · Last lipid panel: 2019  · Last microalbumin: 2019   · Last EK2018  · Last echocardiogram:   ·   Review of systems:  See above.       Current Outpatient Medications:   •  glimepiride (AMARYL) 4 MG Tab, Take 1 Tab by mouth every morning., Disp: 90 Tab, Rfl: 1  •  clotrimazole-betamethasone (LOTRISONE) 1-0.05 % Cream, Apply thin layer to affected area twice a day as needed for rash, do not use longer than 14 days at a time., Disp: 15 g, Rfl: 1  •  hydrochlorothiazide (MICROZIDE) 12.5 MG capsule, Take 1 Cap by mouth every day., Disp: 90 Cap, Rfl: 3  •  ibuprofen (MOTRIN) 200 MG Tab, Take 2 Tabs by mouth at bedtime as needed., Disp: , Rfl:   •  simvastatin (ZOCOR) 20 MG Tab, Take 1 Tab by mouth every evening., Disp: 90 Tab, Rfl: 3  •  metFORMIN ER (GLUCOPHAGE XR) 500 MG TABLET SR 24 HR, Take 2 Tabs by mouth 2 times a day., Disp: 360 Tab, Rfl: 1  •  methimazole (TAPAZOLE) 5 MG Tab, Take 1 Tab by mouth 3 times a day., Disp: 270 Tab, Rfl: 1  •  Lancets Misc, Use 1x/day and PRN for signs and symptoms of high or low blood sugar. Dx E 11.65, Disp: 100 Each, Rfl: 3  •  Blood Glucose Monitoring Suppl Kit, Test blood sugar as recommended by provider.  Dx E 11.65, Disp: 1 Kit, Rfl: PRN  •  Blood Glucose Monitoring Suppl SUPPLIES Misc, For test strips: Use 1x/day and PRN for signs and symptoms of high or low blood sugar. Dx E 11.65, Disp: 100 Each, Rfl: 3  •  albuterol (PROVENTIL HFA) 108 (90 BASE) MCG/ACT Aero Soln inhalation aerosol, Inhale 2 Puffs by mouth every four hours as needed for Shortness of Breath., Disp: 1 Inhaler, Rfl: 3  •  nitroglycerin (NITROSTAT) 0.4 MG SL Tab, Place 1 Tab under tongue as needed for Chest Pain., Disp: 25 Tab, Rfl: 1  •  ASPIRIN 81 MG PO TBEC, Take  by mouth. qd , Disp: , Rfl:     Allergies, past medical history, past surgical history, family history, social history reviewed and updated    Objective:     Vitals: /74    "Pulse 74   Temp 36.4 °C (97.6 °F)   Resp 16   Ht 1.702 m (5' 7\")   Wt 71.2 kg (157 lb)   SpO2 94%   BMI 24.59 kg/m²   General: Alert, pleasant, NAD  HEENT: Normocephalic.   Skin: Warm, dry.  Faint, slightly hypopigmented macule on the inner aspect of the right first toe  Musculoskeletal: Gait is normal.  Moves all extremities well.  Extremities: No leg edema.  Psych:  Affect/mood is normal, judgement is good, memory is intact, grooming is appropriate.    Assessment/Plan:     Gia was seen today for follow-up.    Diagnoses and all orders for this visit:    Type 2 diabetes mellitus without complication, without long-term current use of insulin (HCC)  Uncontrolled.  We discussed increasing her glimepiride.  Follow-up in 3 months  -     POCT Hemoglobin A1C  -     MICROALBUMIN CREAT RATIO URINE; Future  -     Basic Metabolic Panel; Future  -     glimepiride (AMARYL) 4 MG Tab; Take 1 Tab by mouth every morning.    Essential hypertension  Stable, continue current medications  -     Basic Metabolic Panel; Future    Dyslipidemia  Stable, continue current medications  -     Lipid Profile; Future  -     Basic Metabolic Panel; Future    Graves' disease  Last TSH was slightly abnormal, she has not followed up with endocrinology yet.  Gave her information on how to schedule.  We will recheck labs  -     TSH; Future  -     TRIIDOTHYRONINE; Future    Tobacco use  Advised to quit, patient is working on this    Rash  New problem, Will try clotrimazole-betamethasone cream  -     clotrimazole-betamethasone (LOTRISONE) 1-0.05 % Cream; Apply thin layer to affected area twice a day as needed for rash, do not use longer than 14 days at a time.    Screen for colon cancer  -     COLOGUARD (FIT DNA)      Return in about 3 months (around 9/22/2020).        "

## 2020-06-22 ENCOUNTER — OFFICE VISIT (OUTPATIENT)
Dept: MEDICAL GROUP | Facility: MEDICAL CENTER | Age: 65
End: 2020-06-22
Payer: MEDICARE

## 2020-06-22 VITALS
HEART RATE: 74 BPM | OXYGEN SATURATION: 94 % | HEIGHT: 67 IN | TEMPERATURE: 97.6 F | SYSTOLIC BLOOD PRESSURE: 122 MMHG | BODY MASS INDEX: 24.64 KG/M2 | WEIGHT: 157 LBS | DIASTOLIC BLOOD PRESSURE: 74 MMHG | RESPIRATION RATE: 16 BRPM

## 2020-06-22 DIAGNOSIS — Z72.0 TOBACCO USE: ICD-10-CM

## 2020-06-22 DIAGNOSIS — E05.00 GRAVES' DISEASE: ICD-10-CM

## 2020-06-22 DIAGNOSIS — I10 ESSENTIAL HYPERTENSION: ICD-10-CM

## 2020-06-22 DIAGNOSIS — R21 RASH: ICD-10-CM

## 2020-06-22 DIAGNOSIS — E11.9 TYPE 2 DIABETES MELLITUS WITHOUT COMPLICATION, WITHOUT LONG-TERM CURRENT USE OF INSULIN (HCC): ICD-10-CM

## 2020-06-22 DIAGNOSIS — Z12.11 SCREEN FOR COLON CANCER: ICD-10-CM

## 2020-06-22 DIAGNOSIS — E78.5 DYSLIPIDEMIA: ICD-10-CM

## 2020-06-22 LAB
HBA1C MFR BLD: 8 % (ref 0–5.6)
INT CON NEG: NEGATIVE
INT CON POS: POSITIVE

## 2020-06-22 PROCEDURE — 99214 OFFICE O/P EST MOD 30 MIN: CPT | Performed by: FAMILY MEDICINE

## 2020-06-22 PROCEDURE — 83036 HEMOGLOBIN GLYCOSYLATED A1C: CPT | Performed by: FAMILY MEDICINE

## 2020-06-22 RX ORDER — GLIMEPIRIDE 4 MG/1
4 TABLET ORAL EVERY MORNING
Qty: 90 TAB | Refills: 1 | Status: SHIPPED | OUTPATIENT
Start: 2020-06-22 | End: 2021-02-08

## 2020-06-22 RX ORDER — CLOTRIMAZOLE AND BETAMETHASONE DIPROPIONATE 10; .64 MG/G; MG/G
CREAM TOPICAL
Qty: 15 G | Refills: 1 | Status: SHIPPED | OUTPATIENT
Start: 2020-06-22 | End: 2022-11-09

## 2020-06-22 ASSESSMENT — FIBROSIS 4 INDEX: FIB4 SCORE: 0.79

## 2020-06-23 DIAGNOSIS — E78.5 DYSLIPIDEMIA: ICD-10-CM

## 2020-06-23 RX ORDER — SIMVASTATIN 20 MG
20 TABLET ORAL EVERY EVENING
Qty: 90 TAB | Refills: 3 | Status: SHIPPED | OUTPATIENT
Start: 2020-06-23 | End: 2021-07-06

## 2020-07-02 ENCOUNTER — TELEPHONE (OUTPATIENT)
Dept: MEDICAL GROUP | Facility: MEDICAL CENTER | Age: 65
End: 2020-07-02

## 2020-07-02 RX ORDER — NITROFURANTOIN 25; 75 MG/1; MG/1
100 CAPSULE ORAL 2 TIMES DAILY
Qty: 10 CAP | Refills: 0 | Status: SHIPPED | OUTPATIENT
Start: 2020-07-02 | End: 2020-07-07

## 2020-07-02 NOTE — TELEPHONE ENCOUNTER
Pt called stating she has been having UTI symptoms and asked for an Rx for antibiotics.    Please advise

## 2020-07-27 RX ORDER — ALBUTEROL SULFATE 90 UG/1
2 AEROSOL, METERED RESPIRATORY (INHALATION) EVERY 4 HOURS PRN
Qty: 2 G | Refills: 3 | Status: SHIPPED | OUTPATIENT
Start: 2020-07-27 | End: 2021-07-30 | Stop reason: SDUPTHER

## 2020-07-27 NOTE — TELEPHONE ENCOUNTER
Received request via: Patient    Was the patient seen in the last year in this department? Yes    Does the patient have an active prescription (recently filled or refills available) for medication(s) requested? No     ** pt asking for 2 inhalers per refill**

## 2020-09-16 ENCOUNTER — HOSPITAL ENCOUNTER (OUTPATIENT)
Dept: LAB | Facility: MEDICAL CENTER | Age: 65
End: 2020-09-16
Attending: FAMILY MEDICINE
Payer: MEDICARE

## 2020-09-16 DIAGNOSIS — E05.00 GRAVES' DISEASE: ICD-10-CM

## 2020-09-16 DIAGNOSIS — E11.9 TYPE 2 DIABETES MELLITUS WITHOUT COMPLICATION, WITHOUT LONG-TERM CURRENT USE OF INSULIN (HCC): ICD-10-CM

## 2020-09-16 DIAGNOSIS — E78.5 DYSLIPIDEMIA: ICD-10-CM

## 2020-09-16 DIAGNOSIS — I10 ESSENTIAL HYPERTENSION: ICD-10-CM

## 2020-09-16 LAB
ANION GAP SERPL CALC-SCNC: 14 MMOL/L (ref 7–16)
BUN SERPL-MCNC: 14 MG/DL (ref 8–22)
CALCIUM SERPL-MCNC: 9.1 MG/DL (ref 8.5–10.5)
CHLORIDE SERPL-SCNC: 100 MMOL/L (ref 96–112)
CHOLEST SERPL-MCNC: 229 MG/DL (ref 100–199)
CO2 SERPL-SCNC: 25 MMOL/L (ref 20–33)
CREAT SERPL-MCNC: 0.83 MG/DL (ref 0.5–1.4)
CREAT UR-MCNC: 159.36 MG/DL
FASTING STATUS PATIENT QL REPORTED: NORMAL
GLUCOSE SERPL-MCNC: 161 MG/DL (ref 65–99)
HDLC SERPL-MCNC: 74 MG/DL
LDLC SERPL CALC-MCNC: 128 MG/DL
MICROALBUMIN UR-MCNC: <1.2 MG/DL
MICROALBUMIN/CREAT UR: NORMAL MG/G (ref 0–30)
POTASSIUM SERPL-SCNC: 3.7 MMOL/L (ref 3.6–5.5)
SODIUM SERPL-SCNC: 139 MMOL/L (ref 135–145)
T3 SERPL-MCNC: 114 NG/DL (ref 60–181)
TRIGL SERPL-MCNC: 133 MG/DL (ref 0–149)
TSH SERPL DL<=0.005 MIU/L-ACNC: 0.03 UIU/ML (ref 0.38–5.33)

## 2020-09-16 PROCEDURE — 84480 ASSAY TRIIODOTHYRONINE (T3): CPT

## 2020-09-16 PROCEDURE — 84443 ASSAY THYROID STIM HORMONE: CPT

## 2020-09-16 PROCEDURE — 36415 COLL VENOUS BLD VENIPUNCTURE: CPT

## 2020-09-16 PROCEDURE — 80048 BASIC METABOLIC PNL TOTAL CA: CPT

## 2020-09-16 PROCEDURE — 82570 ASSAY OF URINE CREATININE: CPT

## 2020-09-16 PROCEDURE — 80061 LIPID PANEL: CPT

## 2020-09-16 PROCEDURE — 82043 UR ALBUMIN QUANTITATIVE: CPT

## 2020-09-22 ENCOUNTER — OFFICE VISIT (OUTPATIENT)
Dept: MEDICAL GROUP | Facility: MEDICAL CENTER | Age: 65
End: 2020-09-22
Payer: MEDICARE

## 2020-09-22 VITALS
RESPIRATION RATE: 16 BRPM | OXYGEN SATURATION: 96 % | BODY MASS INDEX: 24.64 KG/M2 | DIASTOLIC BLOOD PRESSURE: 70 MMHG | HEIGHT: 67 IN | TEMPERATURE: 97.6 F | SYSTOLIC BLOOD PRESSURE: 124 MMHG | HEART RATE: 64 BPM | WEIGHT: 157 LBS

## 2020-09-22 DIAGNOSIS — E05.90 HYPERTHYROIDISM: ICD-10-CM

## 2020-09-22 DIAGNOSIS — E11.9 TYPE 2 DIABETES MELLITUS WITHOUT COMPLICATION, WITHOUT LONG-TERM CURRENT USE OF INSULIN (HCC): ICD-10-CM

## 2020-09-22 DIAGNOSIS — E78.5 DYSLIPIDEMIA: ICD-10-CM

## 2020-09-22 DIAGNOSIS — I10 ESSENTIAL HYPERTENSION: ICD-10-CM

## 2020-09-22 DIAGNOSIS — Z72.0 TOBACCO USE: ICD-10-CM

## 2020-09-22 DIAGNOSIS — E05.00 GRAVES DISEASE: ICD-10-CM

## 2020-09-22 LAB
HBA1C MFR BLD: 8.8 % (ref 0–5.6)
INT CON NEG: NEGATIVE
INT CON POS: POSITIVE

## 2020-09-22 PROCEDURE — 83036 HEMOGLOBIN GLYCOSYLATED A1C: CPT | Performed by: FAMILY MEDICINE

## 2020-09-22 PROCEDURE — 99214 OFFICE O/P EST MOD 30 MIN: CPT | Performed by: FAMILY MEDICINE

## 2020-09-22 RX ORDER — METHIMAZOLE 5 MG/1
5 TABLET ORAL 3 TIMES DAILY
Qty: 270 TAB | Refills: 1 | Status: SHIPPED | OUTPATIENT
Start: 2020-09-22 | End: 2023-06-22

## 2020-09-22 ASSESSMENT — FIBROSIS 4 INDEX: FIB4 SCORE: 0.8

## 2020-09-22 NOTE — PROGRESS NOTES
cc:  diabetes    Subjective:     Gia Coronel is a 64 y.o. female presenting for:      1. Diabetes, HLD:    --asa: takes 81mg daily, no stomach or stool issues  --a1c:  Today is 8.8, was 8.0 2020.  Is currently taking glimepiride 4 mg daily, metformin ER 1000mg BID.  Denies any hypoglycemic episodes.  No polyuria, polydipsia.  She continues to work on her diet, trying to minimize carbs.  She has been exercising/walking daily.  --LDL: last checked 2020.  Is on zocor 20 mg daily.  No myalgias.  --tobacco: smoking, about 2 cig/day. is working on cutting down and quitting.  She got new dentures.  --eye exam: last exam 2016.  No vision changes.  Would like a referral for an ophthalmologist  --foot exam: last exam 2019.  No numbness/tingling. Denies any foot issues  --microalbumin: last checked 2020.  No urinary symptoms.       2. Hypertension: has a hx of arterial hypertension, has been stable.  Is currently on hctz 12.5mg daily.  Denies any side effects.  Denies any shortness of breath, lightheadedness, dizziness, headaches, leg swelling.  · BP today at goal: yes   · BPs at home: not checked  · Sodium intake: discussed   · DASH diet: discussed   · Physical activity: Walking her dog, discussed  Body mass index is 24.59 kg/m².  · Alcohol use: rare   · Tobacco use: smoker, is trying to cut down    The 10-year ASCVD risk score (Ed JIE Jr., et al., 2013) is: 31.6%    Values used to calculate the score:      Age: 65 years      Sex: Female      Is Non- : Yes      Diabetic: Yes      Tobacco smoker: Yes      Systolic Blood Pressure: 124 mmHg      Is BP treated: Yes      HDL Cholesterol: 74 mg/dL      Total Cholesterol: 229 mg/dL    ? Is also on a baby aspirin and simvastatin 20mg daily.     · Last CBC: 2020  · Last eGFR: >60 on 2020  · Last fasting glucose or a1c: today  · Last lipid panel: 2020  · Last microalbumin: 2020  · Last EK2018  · Last echocardiogram:  2009    Review of systems:  See above.     3.  Graves' disease: She states that she has not been taking her methimazole 5 mg 3 times a day lately.  Her last TSH last week was quite low. Denies any heat/cold intolerance, diarrhea/constipation, abdominal pain, tremors, skin changes, palpitations.        Current Outpatient Medications:   •  methimazole (TAPAZOLE) 5 MG Tab, Take 1 Tab by mouth 3 times a day., Disp: 270 Tab, Rfl: 1  •  albuterol (PROVENTIL HFA) 108 (90 Base) MCG/ACT Aero Soln inhalation aerosol, Inhale 2 Puffs by mouth every four hours as needed for Shortness of Breath., Disp: 2 g, Rfl: 3  •  simvastatin (ZOCOR) 20 MG Tab, Take 1 Tab by mouth every evening., Disp: 90 Tab, Rfl: 3  •  glimepiride (AMARYL) 4 MG Tab, Take 1 Tab by mouth every morning., Disp: 90 Tab, Rfl: 1  •  clotrimazole-betamethasone (LOTRISONE) 1-0.05 % Cream, Apply thin layer to affected area twice a day as needed for rash, do not use longer than 14 days at a time., Disp: 15 g, Rfl: 1  •  hydrochlorothiazide (MICROZIDE) 12.5 MG capsule, Take 1 Cap by mouth every day., Disp: 90 Cap, Rfl: 3  •  ibuprofen (MOTRIN) 200 MG Tab, Take 2 Tabs by mouth at bedtime as needed., Disp: , Rfl:   •  metFORMIN ER (GLUCOPHAGE XR) 500 MG TABLET SR 24 HR, Take 2 Tabs by mouth 2 times a day., Disp: 360 Tab, Rfl: 1  •  Lancets Misc, Use 1x/day and PRN for signs and symptoms of high or low blood sugar. Dx E 11.65, Disp: 100 Each, Rfl: 3  •  Blood Glucose Monitoring Suppl Kit, Test blood sugar as recommended by provider.  Dx E 11.65, Disp: 1 Kit, Rfl: PRN  •  Blood Glucose Monitoring Suppl SUPPLIES Misc, For test strips: Use 1x/day and PRN for signs and symptoms of high or low blood sugar. Dx E 11.65, Disp: 100 Each, Rfl: 3  •  nitroglycerin (NITROSTAT) 0.4 MG SL Tab, Place 1 Tab under tongue as needed for Chest Pain., Disp: 25 Tab, Rfl: 1  •  ASPIRIN 81 MG PO TBEC, Take  by mouth. qd , Disp: , Rfl:     Allergies, past medical history, past surgical history,  "family history, social history reviewed and updated    Objective:     Vitals: /70   Pulse 64   Temp 36.4 °C (97.6 °F)   Resp 16   Ht 1.702 m (5' 7\")   Wt 71.2 kg (157 lb)   SpO2 96%   BMI 24.59 kg/m²   General: Alert, pleasant, NAD  HEENT: Normocephalic.    Heart: Regular rate and rhythm.  S1 and S2 normal.  No murmurs appreciated.  Respiratory: Normal respiratory effort.  Clear to auscultation bilaterally.  Abdomen: Non-distended, soft  Skin: Warm, dry, no rashes.  Musculoskeletal: Gait is normal.  Moves all extremities well.  Extremities: Distal pulses 2+ symmetric.  No edema, skin lesions; toe nails clean.  Normal monofilament.  Achilles reflexes 2+ symmetric.  Psych:  Affect/mood is normal, judgement is good, memory is intact, grooming is appropriate.    Assessment/Plan:     Gia was seen today for follow-up.    Diagnoses and all orders for this visit:    Type 2 diabetes mellitus without complication, without long-term current use of insulin (HCC)  Uncontrolled.  We discussed adjusting her medications, adding a medication as well, she was not interested.  She wants to continue working on lifestyle modification.  Follow-up in 3 months.  Foot exam was normal today.  Referral to ophthalmology placed  -     POCT Hemoglobin A1C  -     REFERRAL TO OPHTHALMOLOGY  -     Diabetic Monofilament Lower Extremity Exam    Dyslipidemia  Worsening.  She also reports that she was not taking her statin regularly.    Essential hypertension  Stable, continue current medications    Graves disease  Hyperthyroidism  Worsening.  We discussed restarting her medications.  She is not interested in a referral to endocrinology  -     methimazole (TAPAZOLE) 5 MG Tab; Take 1 Tab by mouth 3 times a day.  -     REFERRAL TO OPHTHALMOLOGY    Tobacco use  Advised to quit, patient is working on this        Return in about 3 months (around 12/22/2020) for Med check.      "

## 2021-02-08 RX ORDER — GLIMEPIRIDE 4 MG/1
4 TABLET ORAL EVERY MORNING
Qty: 90 TAB | Refills: 0 | Status: SHIPPED | OUTPATIENT
Start: 2021-02-08 | End: 2021-02-26 | Stop reason: SDUPTHER

## 2021-02-26 ENCOUNTER — OFFICE VISIT (OUTPATIENT)
Dept: MEDICAL GROUP | Facility: MEDICAL CENTER | Age: 66
End: 2021-02-26
Payer: MEDICARE

## 2021-02-26 VITALS
BODY MASS INDEX: 24.01 KG/M2 | TEMPERATURE: 97 F | SYSTOLIC BLOOD PRESSURE: 124 MMHG | WEIGHT: 153 LBS | OXYGEN SATURATION: 96 % | HEART RATE: 74 BPM | RESPIRATION RATE: 16 BRPM | HEIGHT: 67 IN | DIASTOLIC BLOOD PRESSURE: 70 MMHG

## 2021-02-26 DIAGNOSIS — E78.5 DYSLIPIDEMIA: ICD-10-CM

## 2021-02-26 DIAGNOSIS — Z72.0 TOBACCO USE: ICD-10-CM

## 2021-02-26 DIAGNOSIS — E11.9 TYPE 2 DIABETES MELLITUS WITHOUT COMPLICATION, WITHOUT LONG-TERM CURRENT USE OF INSULIN (HCC): ICD-10-CM

## 2021-02-26 DIAGNOSIS — I10 ESSENTIAL HYPERTENSION: ICD-10-CM

## 2021-02-26 LAB
HBA1C MFR BLD: 8.3 % (ref 0–5.6)
INT CON NEG: NEGATIVE
INT CON POS: POSITIVE

## 2021-02-26 PROCEDURE — 99214 OFFICE O/P EST MOD 30 MIN: CPT | Performed by: FAMILY MEDICINE

## 2021-02-26 PROCEDURE — 83036 HEMOGLOBIN GLYCOSYLATED A1C: CPT | Performed by: FAMILY MEDICINE

## 2021-02-26 RX ORDER — GLIMEPIRIDE 4 MG/1
4 TABLET ORAL EVERY MORNING
Qty: 30 TABLET | Refills: 11 | Status: SHIPPED
Start: 2021-02-26 | End: 2021-07-30 | Stop reason: SDUPTHER

## 2021-02-26 RX ORDER — PIOGLITAZONEHYDROCHLORIDE 30 MG/1
30 TABLET ORAL DAILY
Qty: 30 TABLET | Refills: 11 | Status: SHIPPED
Start: 2021-02-26 | End: 2022-03-20

## 2021-02-26 RX ORDER — PIOGLITAZONEHYDROCHLORIDE 30 MG/1
30 TABLET ORAL DAILY
Qty: 30 TABLET | Refills: 11 | Status: SHIPPED | OUTPATIENT
Start: 2021-02-26 | End: 2021-02-26 | Stop reason: SDUPTHER

## 2021-02-26 RX ORDER — METFORMIN HYDROCHLORIDE 500 MG/1
1000 TABLET, EXTENDED RELEASE ORAL 2 TIMES DAILY
Qty: 120 TABLET | Refills: 11 | Status: SHIPPED
Start: 2021-02-26 | End: 2021-07-30

## 2021-02-26 ASSESSMENT — FIBROSIS 4 INDEX: FIB4 SCORE: 0.8

## 2021-02-26 ASSESSMENT — PATIENT HEALTH QUESTIONNAIRE - PHQ9: CLINICAL INTERPRETATION OF PHQ2 SCORE: 0

## 2021-02-26 NOTE — PROGRESS NOTES
cc: Diabetes    Subjective:     Gia Coronel is a 65 y.o. female presenting for follow-up of her diabetes.  Since her last visit, she has not been watching her diet well.  She has not been exercising either.  She states that she has not been taking her Metformin regularly due to occasional GI upset.  She also states that she has been paying for her medications out-of-pocket as she does not have prescription drug coverage.  Would like to avoid any additional medications if possible.  Her A1c today is 8.3%      Review of systems:  See above.       Current Outpatient Medications:   •  metFORMIN ER (GLUCOPHAGE XR) 500 MG TABLET SR 24 HR, Take 2 Tablets by mouth 2 times a day., Disp: 120 tablet, Rfl: 11  •  pioglitazone (ACTOS) 30 MG Tab, Take 1 tablet by mouth every day., Disp: 30 tablet, Rfl: 11  •  glimepiride (AMARYL) 4 MG Tab, Take 1 tablet by mouth every morning., Disp: 30 tablet, Rfl: 11  •  methimazole (TAPAZOLE) 5 MG Tab, Take 1 Tab by mouth 3 times a day., Disp: 270 Tab, Rfl: 1  •  albuterol (PROVENTIL HFA) 108 (90 Base) MCG/ACT Aero Soln inhalation aerosol, Inhale 2 Puffs by mouth every four hours as needed for Shortness of Breath., Disp: 2 g, Rfl: 3  •  simvastatin (ZOCOR) 20 MG Tab, Take 1 Tab by mouth every evening., Disp: 90 Tab, Rfl: 3  •  clotrimazole-betamethasone (LOTRISONE) 1-0.05 % Cream, Apply thin layer to affected area twice a day as needed for rash, do not use longer than 14 days at a time., Disp: 15 g, Rfl: 1  •  hydrochlorothiazide (MICROZIDE) 12.5 MG capsule, Take 1 Cap by mouth every day., Disp: 90 Cap, Rfl: 3  •  ibuprofen (MOTRIN) 200 MG Tab, Take 2 Tabs by mouth at bedtime as needed., Disp: , Rfl:   •  Lancets Misc, Use 1x/day and PRN for signs and symptoms of high or low blood sugar. Dx E 11.65, Disp: 100 Each, Rfl: 3  •  Blood Glucose Monitoring Suppl Kit, Test blood sugar as recommended by provider.  Dx E 11.65, Disp: 1 Kit, Rfl: PRN  •  Blood Glucose Monitoring Suppl SUPPLIES  "Misc, For test strips: Use 1x/day and PRN for signs and symptoms of high or low blood sugar. Dx E 11.65, Disp: 100 Each, Rfl: 3  •  nitroglycerin (NITROSTAT) 0.4 MG SL Tab, Place 1 Tab under tongue as needed for Chest Pain., Disp: 25 Tab, Rfl: 1  •  ASPIRIN 81 MG PO TBEC, Take  by mouth. qd , Disp: , Rfl:     Allergies, past medical history, past surgical history, family history, social history reviewed and updated    Objective:     Vitals: /70   Pulse 74   Temp 36.1 °C (97 °F)   Resp 16   Ht 1.702 m (5' 7\")   Wt 69.4 kg (153 lb)   SpO2 96%   BMI 23.96 kg/m²   General: Alert, pleasant, NAD  HEENT: Normocephalic.   Heart: Regular rate and rhythm.  S1 and S2 normal.  No murmurs appreciated.  Respiratory: Normal respiratory effort.  Clear to auscultation bilaterally.  Abdomen: Non-distended, soft  Skin: Warm, dry, no rashes.  Musculoskeletal: Gait is normal.  Moves all extremities well.  Extremities: No leg edema.  Psych:  Affect/mood is normal, judgement is good, memory is intact, grooming is appropriate.    Assessment/Plan:     Gia was seen today for follow-up.    Diagnoses and all orders for this visit:    Type 2 diabetes mellitus without complication, without long-term current use of insulin (HCC)  Chronic, uncontrolled.  We discussed healthy diet, regular exercise.  We also discussed various medication options.  As cost is a concern for her, will add Actos, discussed potential side effects.  We also discussed trying to go to the care [facility] for some medications, prescriptions printed for patient to take there.  Follow-up in 4 months.  -     POCT Hemoglobin A1C  -     metFORMIN ER (GLUCOPHAGE XR) 500 MG TABLET SR 24 HR; Take 2 Tablets by mouth 2 times a day.  -     pioglitazone (ACTOS) 30 MG Tab; Take 1 tablet by mouth every day.  -     glimepiride (AMARYL) 4 MG Tab; Take 1 tablet by mouth every morning.    Essential hypertension  Chronic, stable, continue " hydrochlorothiazide    Dyslipidemia  Chronic, stable, continue simvastatin    Tobacco use  Advised to quit, patient is not interested        Return in about 4 months (around 6/26/2021) for routine follow up.

## 2021-04-14 ENCOUNTER — TELEPHONE (OUTPATIENT)
Dept: MEDICAL GROUP | Facility: MEDICAL CENTER | Age: 66
End: 2021-04-14

## 2021-04-14 NOTE — TELEPHONE ENCOUNTER
"Phone Number Called: [phone]    Call outcome: Spoke to patient regarding message below.    Message: Called to follow up with the patient regarding her voicemail left from the patient. Patient got the Ari and Ari vaccine on 04/10/2021. Patient wanted to know if she needs to be seen due to \"lots of people getting blood clots\". Patient states she has no symptoms. Informed patient she only needs to be seen if she starts to develop symptoms.     "

## 2021-07-06 DIAGNOSIS — E78.5 DYSLIPIDEMIA: ICD-10-CM

## 2021-07-06 RX ORDER — SIMVASTATIN 20 MG
20 TABLET ORAL EVERY EVENING
Qty: 90 TABLET | Refills: 1 | Status: SHIPPED | OUTPATIENT
Start: 2021-07-06 | End: 2022-05-16

## 2021-07-06 RX ORDER — HYDROCHLOROTHIAZIDE 12.5 MG/1
12.5 CAPSULE, GELATIN COATED ORAL DAILY
Qty: 90 CAPSULE | Refills: 1 | Status: SHIPPED | OUTPATIENT
Start: 2021-07-06 | End: 2021-10-12

## 2021-07-30 ENCOUNTER — OFFICE VISIT (OUTPATIENT)
Dept: MEDICAL GROUP | Facility: MEDICAL CENTER | Age: 66
End: 2021-07-30
Payer: MEDICARE

## 2021-07-30 VITALS
BODY MASS INDEX: 24.53 KG/M2 | HEIGHT: 67 IN | WEIGHT: 156.31 LBS | TEMPERATURE: 97.8 F | HEART RATE: 91 BPM | OXYGEN SATURATION: 95 % | SYSTOLIC BLOOD PRESSURE: 116 MMHG | DIASTOLIC BLOOD PRESSURE: 58 MMHG

## 2021-07-30 DIAGNOSIS — E78.5 DYSLIPIDEMIA: ICD-10-CM

## 2021-07-30 DIAGNOSIS — E11.65 TYPE 2 DIABETES MELLITUS WITH HYPERGLYCEMIA, WITHOUT LONG-TERM CURRENT USE OF INSULIN (HCC): ICD-10-CM

## 2021-07-30 DIAGNOSIS — E05.00 GRAVES' DISEASE: ICD-10-CM

## 2021-07-30 DIAGNOSIS — Z12.31 ENCOUNTER FOR SCREENING MAMMOGRAM FOR MALIGNANT NEOPLASM OF BREAST: ICD-10-CM

## 2021-07-30 DIAGNOSIS — Z12.11 SCREEN FOR COLON CANCER: ICD-10-CM

## 2021-07-30 DIAGNOSIS — I10 ESSENTIAL HYPERTENSION: ICD-10-CM

## 2021-07-30 DIAGNOSIS — Z72.0 TOBACCO USE: ICD-10-CM

## 2021-07-30 LAB
HBA1C MFR BLD: 8.3 % (ref 0–5.6)
INT CON NEG: NEGATIVE
INT CON POS: POSITIVE

## 2021-07-30 PROCEDURE — 99214 OFFICE O/P EST MOD 30 MIN: CPT | Performed by: FAMILY MEDICINE

## 2021-07-30 PROCEDURE — 83036 HEMOGLOBIN GLYCOSYLATED A1C: CPT | Performed by: FAMILY MEDICINE

## 2021-07-30 RX ORDER — GLIMEPIRIDE 4 MG/1
4 TABLET ORAL 2 TIMES DAILY
Qty: 60 TABLET | Refills: 11 | Status: SHIPPED
Start: 2021-07-30 | End: 2022-03-20

## 2021-07-30 RX ORDER — ALBUTEROL SULFATE 90 UG/1
2 AEROSOL, METERED RESPIRATORY (INHALATION) EVERY 4 HOURS PRN
Qty: 2 G | Refills: 3 | Status: SHIPPED
Start: 2021-07-30

## 2021-07-30 RX ORDER — METFORMIN HYDROCHLORIDE 500 MG/1
1000 TABLET, EXTENDED RELEASE ORAL 2 TIMES DAILY
COMMUNITY
Start: 2021-07-30 | End: 2022-05-16

## 2021-07-30 ASSESSMENT — FIBROSIS 4 INDEX: FIB4 SCORE: 0.8

## 2021-07-30 NOTE — PROGRESS NOTES
Subjective:     Gia Coronel is a 65 y.o. female presenting for follow-up of her diabetes.  She reports that she has not been watching her diet, has been eating more cheese cake lately.  Her A1c today is 8.3%.  She stopped her Metformin as it was giving her looser stools, but she would like to restart this.  She continues on the glimepiride 4 mg daily, pioglitazone 30 mg daily.  She is currently getting medications for free at [facility] chest is her finances are quite limited.  She continues on her other medications regularly, has no other issues.        Current Outpatient Medications:   •  metFORMIN ER (GLUCOPHAGE XR) 500 MG TABLET SR 24 HR, Take 2 Tablets by mouth 2 times a day., Disp: , Rfl:   •  glimepiride (AMARYL) 4 MG Tab, Take 1 tablet by mouth 2 times a day., Disp: 60 tablet, Rfl: 11  •  albuterol (PROVENTIL HFA) 108 (90 Base) MCG/ACT Aero Soln inhalation aerosol, Inhale 2 Puffs every four hours as needed for Shortness of Breath., Disp: 2 g, Rfl: 3  •  simvastatin (ZOCOR) 20 MG Tab, Take 1 tablet by mouth every evening., Disp: 90 tablet, Rfl: 1  •  hydrochlorothiazide (MICROZIDE) 12.5 MG capsule, Take 1 capsule by mouth every day., Disp: 90 capsule, Rfl: 1  •  pioglitazone (ACTOS) 30 MG Tab, Take 1 tablet by mouth every day., Disp: 30 tablet, Rfl: 11  •  methimazole (TAPAZOLE) 5 MG Tab, Take 1 Tab by mouth 3 times a day., Disp: 270 Tab, Rfl: 1  •  clotrimazole-betamethasone (LOTRISONE) 1-0.05 % Cream, Apply thin layer to affected area twice a day as needed for rash, do not use longer than 14 days at a time., Disp: 15 g, Rfl: 1  •  Lancets Misc, Use 1x/day and PRN for signs and symptoms of high or low blood sugar. Dx E 11.65, Disp: 100 Each, Rfl: 3  •  Blood Glucose Monitoring Suppl Kit, Test blood sugar as recommended by provider.  Dx E 11.65, Disp: 1 Kit, Rfl: PRN  •  Blood Glucose Monitoring Suppl SUPPLIES Misc, For test strips: Use 1x/day and PRN for signs and symptoms of high or low blood sugar.  "Dx E 11.65, Disp: 100 Each, Rfl: 3  •  nitroglycerin (NITROSTAT) 0.4 MG SL Tab, Place 1 Tab under tongue as needed for Chest Pain., Disp: 25 Tab, Rfl: 1    Objective:     Vitals: /58 (BP Location: Left arm, Patient Position: Sitting, BP Cuff Size: Adult)   Pulse 91   Temp 36.6 °C (97.8 °F) (Temporal)   Ht 1.702 m (5' 7\")   Wt 70.9 kg (156 lb 4.9 oz)   SpO2 95%   BMI 24.48 kg/m²   General: Alert  HEENT: Normocephalic.   Heart: Regular rate and rhythm.  S1 and S2 normal.  No murmurs appreciated.  Respiratory: Normal respiratory effort.  Clear to auscultation bilaterally.  Abdomen: Non-distended, soft  Extremities: No leg edema.    Assessment/Plan:     Gia was seen today for follow-up.    Diagnoses and all orders for this visit:    Type 2 diabetes mellitus with hyperglycemia, without long-term current use of insulin (HCC)  Chronic, progressing.  We will increase her glimepiride to twice a day.  Continue Metformin, pioglitazone.  Follow-up in 4 months  -     POCT Hemoglobin A1C  -     REFERRAL TO OPHTHALMOLOGY  -     glimepiride (AMARYL) 4 MG Tab; Take 1 tablet by mouth 2 times a day.  -     Comp Metabolic Panel; Future  -     MICROALBUMIN CREAT RATIO URINE; Future    Essential hypertension  Chronic, stable, continue hydrochlorothiazide  -     Comp Metabolic Panel; Future    Dyslipidemia  Chronic, stable, continue simvastatin  -     Lipid Profile; Future  -     Comp Metabolic Panel; Future    Graves' disease  Chronic, stable, patient plans to see endocrinology soon  -     TSH; Future    Tobacco use  Advised to quit, patient is working on this    Screen for colon cancer  -     REFERRAL TO GASTROENTEROLOGY    Encounter for screening mammogram for malignant neoplasm of breast  -     MA-SCREENING MAMMO BILAT W/TOMOSYNTHESIS W/CAD; Future    Other orders  -     albuterol (PROVENTIL HFA) 108 (90 Base) MCG/ACT Aero Soln inhalation aerosol; Inhale 2 Puffs every four hours as needed for Shortness of " Breath.          Return in about 4 months (around 11/30/2021) for routine follow up.

## 2021-08-11 ENCOUNTER — APPOINTMENT (OUTPATIENT)
Dept: RADIOLOGY | Facility: MEDICAL CENTER | Age: 66
End: 2021-08-11
Attending: FAMILY MEDICINE
Payer: MEDICARE

## 2021-08-20 ENCOUNTER — HOSPITAL ENCOUNTER (OUTPATIENT)
Dept: RADIOLOGY | Facility: MEDICAL CENTER | Age: 66
End: 2021-08-20
Attending: FAMILY MEDICINE
Payer: MEDICARE

## 2021-08-20 DIAGNOSIS — Z12.31 ENCOUNTER FOR SCREENING MAMMOGRAM FOR MALIGNANT NEOPLASM OF BREAST: ICD-10-CM

## 2021-08-20 PROCEDURE — 77063 BREAST TOMOSYNTHESIS BI: CPT

## 2021-10-12 RX ORDER — HYDROCHLOROTHIAZIDE 12.5 MG/1
12.5 CAPSULE, GELATIN COATED ORAL DAILY
Qty: 90 CAPSULE | Refills: 3 | Status: SHIPPED | OUTPATIENT
Start: 2021-10-12 | End: 2023-01-12 | Stop reason: SDUPTHER

## 2021-12-09 ENCOUNTER — APPOINTMENT (OUTPATIENT)
Dept: MEDICAL GROUP | Facility: MEDICAL CENTER | Age: 66
End: 2021-12-09
Payer: MEDICARE

## 2022-01-07 ENCOUNTER — HOSPITAL ENCOUNTER (OUTPATIENT)
Dept: LAB | Facility: MEDICAL CENTER | Age: 67
End: 2022-01-07
Attending: FAMILY MEDICINE
Payer: MEDICARE

## 2022-01-07 DIAGNOSIS — E11.65 TYPE 2 DIABETES MELLITUS WITH HYPERGLYCEMIA, WITHOUT LONG-TERM CURRENT USE OF INSULIN (HCC): ICD-10-CM

## 2022-01-07 DIAGNOSIS — E05.00 GRAVES' DISEASE: ICD-10-CM

## 2022-01-07 DIAGNOSIS — I10 ESSENTIAL HYPERTENSION: ICD-10-CM

## 2022-01-07 DIAGNOSIS — E78.5 DYSLIPIDEMIA: ICD-10-CM

## 2022-01-07 LAB
ALBUMIN SERPL BCP-MCNC: 4 G/DL (ref 3.2–4.9)
ALBUMIN/GLOB SERPL: 1.5 G/DL
ALP SERPL-CCNC: 79 U/L (ref 30–99)
ALT SERPL-CCNC: 12 U/L (ref 2–50)
ANION GAP SERPL CALC-SCNC: 14 MMOL/L (ref 7–16)
AST SERPL-CCNC: 18 U/L (ref 12–45)
BILIRUB SERPL-MCNC: 0.8 MG/DL (ref 0.1–1.5)
BUN SERPL-MCNC: 9 MG/DL (ref 8–22)
CALCIUM SERPL-MCNC: 8.8 MG/DL (ref 8.5–10.5)
CHLORIDE SERPL-SCNC: 103 MMOL/L (ref 96–112)
CHOLEST SERPL-MCNC: 215 MG/DL (ref 100–199)
CO2 SERPL-SCNC: 24 MMOL/L (ref 20–33)
CREAT SERPL-MCNC: 0.91 MG/DL (ref 0.5–1.4)
FASTING STATUS PATIENT QL REPORTED: NORMAL
GLOBULIN SER CALC-MCNC: 2.7 G/DL (ref 1.9–3.5)
GLUCOSE SERPL-MCNC: 97 MG/DL (ref 65–99)
HDLC SERPL-MCNC: 67 MG/DL
LDLC SERPL CALC-MCNC: 134 MG/DL
POTASSIUM SERPL-SCNC: 3.3 MMOL/L (ref 3.6–5.5)
PROT SERPL-MCNC: 6.7 G/DL (ref 6–8.2)
SODIUM SERPL-SCNC: 141 MMOL/L (ref 135–145)
TRIGL SERPL-MCNC: 72 MG/DL (ref 0–149)
TSH SERPL DL<=0.005 MIU/L-ACNC: 0.04 UIU/ML (ref 0.38–5.33)

## 2022-01-07 PROCEDURE — 84443 ASSAY THYROID STIM HORMONE: CPT

## 2022-01-07 PROCEDURE — 80053 COMPREHEN METABOLIC PANEL: CPT

## 2022-01-07 PROCEDURE — 36415 COLL VENOUS BLD VENIPUNCTURE: CPT

## 2022-01-07 PROCEDURE — 80061 LIPID PANEL: CPT

## 2022-01-10 ENCOUNTER — HOSPITAL ENCOUNTER (OUTPATIENT)
Facility: MEDICAL CENTER | Age: 67
End: 2022-01-10
Attending: FAMILY MEDICINE
Payer: MEDICARE

## 2022-01-10 LAB
CREAT UR-MCNC: 107.06 MG/DL
MICROALBUMIN UR-MCNC: <1.2 MG/DL
MICROALBUMIN/CREAT UR: NORMAL MG/G (ref 0–30)

## 2022-01-10 PROCEDURE — 82570 ASSAY OF URINE CREATININE: CPT

## 2022-01-10 PROCEDURE — 82043 UR ALBUMIN QUANTITATIVE: CPT

## 2022-01-11 ENCOUNTER — OFFICE VISIT (OUTPATIENT)
Dept: MEDICAL GROUP | Facility: MEDICAL CENTER | Age: 67
End: 2022-01-11
Payer: MEDICARE

## 2022-01-11 VITALS
DIASTOLIC BLOOD PRESSURE: 74 MMHG | WEIGHT: 148 LBS | TEMPERATURE: 97.6 F | HEIGHT: 67 IN | BODY MASS INDEX: 23.23 KG/M2 | SYSTOLIC BLOOD PRESSURE: 124 MMHG | HEART RATE: 74 BPM | OXYGEN SATURATION: 96 % | RESPIRATION RATE: 16 BRPM

## 2022-01-11 DIAGNOSIS — E78.5 DYSLIPIDEMIA: ICD-10-CM

## 2022-01-11 DIAGNOSIS — E05.00 GRAVES' DISEASE: ICD-10-CM

## 2022-01-11 DIAGNOSIS — Z72.0 TOBACCO USE: ICD-10-CM

## 2022-01-11 DIAGNOSIS — Z12.11 SCREEN FOR COLON CANCER: ICD-10-CM

## 2022-01-11 DIAGNOSIS — I10 ESSENTIAL HYPERTENSION: ICD-10-CM

## 2022-01-11 DIAGNOSIS — E11.65 TYPE 2 DIABETES MELLITUS WITH HYPERGLYCEMIA, WITHOUT LONG-TERM CURRENT USE OF INSULIN (HCC): ICD-10-CM

## 2022-01-11 LAB
HBA1C MFR BLD: 8.4 % (ref 0–5.6)
INT CON NEG: NEGATIVE
INT CON POS: POSITIVE

## 2022-01-11 PROCEDURE — 99214 OFFICE O/P EST MOD 30 MIN: CPT | Performed by: FAMILY MEDICINE

## 2022-01-11 PROCEDURE — 83036 HEMOGLOBIN GLYCOSYLATED A1C: CPT | Performed by: FAMILY MEDICINE

## 2022-01-11 ASSESSMENT — PATIENT HEALTH QUESTIONNAIRE - PHQ9: CLINICAL INTERPRETATION OF PHQ2 SCORE: 0

## 2022-01-11 ASSESSMENT — FIBROSIS 4 INDEX: FIB4 SCORE: 1.31

## 2022-01-11 NOTE — PROGRESS NOTES
Subjective:     Gia Farmer is a 66 y.o. female presenting for follow-up of diabetes.  Her A1c today is 8.4%.  She reports taking glimepiride 4 mg twice a day, metformin 1000 mg twice a day, pioglitazone 30 mg daily.  Denies any side effects.  She currently gets the cost of her medications covered by [facility].  Is willing to add another medication, but is concerned about cost.  She also reports a floater in her right eye for the past several months.  Has an appointment with an eye doctor next week.  She denies any numbness or tingling in her extremities.        Current Outpatient Medications:   •  hydrochlorothiazide (MICROZIDE) 12.5 MG capsule, Take 1 Capsule by mouth every day., Disp: 90 Capsule, Rfl: 3  •  metFORMIN ER (GLUCOPHAGE XR) 500 MG TABLET SR 24 HR, Take 2 Tablets by mouth 2 times a day., Disp: , Rfl:   •  glimepiride (AMARYL) 4 MG Tab, Take 1 tablet by mouth 2 times a day., Disp: 60 tablet, Rfl: 11  •  albuterol (PROVENTIL HFA) 108 (90 Base) MCG/ACT Aero Soln inhalation aerosol, Inhale 2 Puffs every four hours as needed for Shortness of Breath., Disp: 2 g, Rfl: 3  •  simvastatin (ZOCOR) 20 MG Tab, Take 1 tablet by mouth every evening., Disp: 90 tablet, Rfl: 1  •  pioglitazone (ACTOS) 30 MG Tab, Take 1 tablet by mouth every day., Disp: 30 tablet, Rfl: 11  •  methimazole (TAPAZOLE) 5 MG Tab, Take 1 Tab by mouth 3 times a day., Disp: 270 Tab, Rfl: 1  •  clotrimazole-betamethasone (LOTRISONE) 1-0.05 % Cream, Apply thin layer to affected area twice a day as needed for rash, do not use longer than 14 days at a time., Disp: 15 g, Rfl: 1  •  Lancets Misc, Use 1x/day and PRN for signs and symptoms of high or low blood sugar. Dx E 11.65, Disp: 100 Each, Rfl: 3  •  Blood Glucose Monitoring Suppl Kit, Test blood sugar as recommended by provider.  Dx E 11.65, Disp: 1 Kit, Rfl: PRN  •  Blood Glucose Monitoring Suppl SUPPLIES Misc, For test strips: Use 1x/day and PRN for signs and symptoms of high or low  "blood sugar. Dx E 11.65, Disp: 100 Each, Rfl: 3  •  nitroglycerin (NITROSTAT) 0.4 MG SL Tab, Place 1 Tab under tongue as needed for Chest Pain., Disp: 25 Tab, Rfl: 1    Objective:     Vitals: /74   Pulse 74   Temp 36.4 °C (97.6 °F)   Resp 16   Ht 1.702 m (5' 7\")   Wt 67.1 kg (148 lb)   SpO2 96%   BMI 23.18 kg/m²   General: Alert  HEENT: Normocephalic.   Extremities: Distal pulses 2+ symmetric.  No edema, skin lesions; toe nails clean.  Normal monofilament.  Achilles reflexes 2+ symmetric.    Assessment/Plan:     Gia was seen today for follow-up.    Diagnoses and all orders for this visit:    Type 2 diabetes mellitus with hyperglycemia, without long-term current use of insulin (HCC)   chronic, uncontrolled.  We will refer her to the pharmacotherapy service as her blood sugars remain above goal and cost is a concern for her.  Foot exam was normal today.  Is getting an eye exam next week.  Follow-up in 4 months  -     POCT Hemoglobin A1C  -     Diabetic Monofilament Lower Extremity Exam  -     Referral to Pharmacotherapy Service    Essential hypertension  Chronic, stable, continue current medications    Dyslipidemia  Chronic, stable, continue simvastatin    Tobacco use  Advised to quit    Graves' disease  Chronic, patient declines referral to endocrinology.    Screen for colon cancer  -     Referral to GI for Colonoscopy          Return in about 4 months (around 5/11/2022) for routine follow up.    "

## 2022-01-12 ENCOUNTER — DOCUMENTATION (OUTPATIENT)
Dept: VASCULAR LAB | Facility: MEDICAL CENTER | Age: 67
End: 2022-01-12

## 2022-01-12 NOTE — PROGRESS NOTES
[facility] Pharmacotherapy [facility]    Received pharmacotherapy referral for DMII from Dr. Gutierrez on 1-11-22.    LM for patient to call back to establish care.    Insurance: Medicare  PCP: [facility]  Locations to be seen: Any    [facility]ticoagulation/Pharmacotherapy [facility] at [phone], fax [phone]    Joe Rubio, KashifD, BCACP

## 2022-01-19 ENCOUNTER — TELEPHONE (OUTPATIENT)
Dept: VASCULAR LAB | Facility: MEDICAL CENTER | Age: 67
End: 2022-01-19

## 2022-01-19 NOTE — TELEPHONE ENCOUNTER
[facility] and Pharmacotherapy [facility]     Received pharmacotherapy referral for DMII from Dr. Gutierrez on 1-11-22.     2nd call  NP scheduled for 1/24     Insurance: Medicare  PCP: [facility]  Locations to be seen: Any     [facility] Anticoagulation/Pharmacotherapy Clinic at [phone], fax [phone]     Susan Moreno, PharmD

## 2022-01-24 ENCOUNTER — NON-PROVIDER VISIT (OUTPATIENT)
Dept: VASCULAR LAB | Facility: MEDICAL CENTER | Age: 67
End: 2022-01-24
Attending: INTERNAL MEDICINE
Payer: MEDICARE

## 2022-01-24 NOTE — NON-PROVIDER
Gia was referred to our pharmacotherapy clinic for uncontrolled diabetes management. Her initial appointment was scheduled for this morning, but she called and cancelled the appointment. I reached out to her via phone to discuss any concerns and attempt to reschedule her visit. Gia declined to be seen in our clinic expressing concerns for cost of the visits. I will remove her from our referral list so that she does not receive more calls for scheduling in our clinic.     Dontrell Gregory, PharmD

## 2022-03-18 DIAGNOSIS — E11.9 TYPE 2 DIABETES MELLITUS WITHOUT COMPLICATION, WITHOUT LONG-TERM CURRENT USE OF INSULIN (HCC): ICD-10-CM

## 2022-03-18 DIAGNOSIS — E11.65 TYPE 2 DIABETES MELLITUS WITH HYPERGLYCEMIA, WITHOUT LONG-TERM CURRENT USE OF INSULIN (HCC): ICD-10-CM

## 2022-03-20 RX ORDER — GLIMEPIRIDE 4 MG/1
4 TABLET ORAL EVERY MORNING
Qty: 90 TABLET | Refills: 1 | Status: SHIPPED | OUTPATIENT
Start: 2022-03-20 | End: 2022-06-01 | Stop reason: SDUPTHER

## 2022-03-20 RX ORDER — PIOGLITAZONEHYDROCHLORIDE 30 MG/1
30 TABLET ORAL DAILY
Qty: 90 TABLET | Refills: 1 | Status: SHIPPED | OUTPATIENT
Start: 2022-03-20 | End: 2023-01-09

## 2022-05-11 ENCOUNTER — APPOINTMENT (OUTPATIENT)
Dept: MEDICAL GROUP | Facility: MEDICAL CENTER | Age: 67
End: 2022-05-11
Payer: MEDICARE

## 2022-05-16 DIAGNOSIS — E11.65 TYPE 2 DIABETES MELLITUS WITH HYPERGLYCEMIA, WITHOUT LONG-TERM CURRENT USE OF INSULIN (HCC): ICD-10-CM

## 2022-05-16 DIAGNOSIS — E78.5 DYSLIPIDEMIA: ICD-10-CM

## 2022-05-16 RX ORDER — METFORMIN HYDROCHLORIDE 500 MG/1
1000 TABLET, EXTENDED RELEASE ORAL 2 TIMES DAILY
Qty: 360 TABLET | Refills: 1 | Status: SHIPPED | OUTPATIENT
Start: 2022-05-16 | End: 2022-08-04

## 2022-05-16 RX ORDER — SIMVASTATIN 20 MG
20 TABLET ORAL EVERY EVENING
Qty: 90 TABLET | Refills: 1 | Status: SHIPPED | OUTPATIENT
Start: 2022-05-16 | End: 2023-12-06 | Stop reason: SDUPTHER

## 2022-06-01 ENCOUNTER — OFFICE VISIT (OUTPATIENT)
Dept: MEDICAL GROUP | Facility: MEDICAL CENTER | Age: 67
End: 2022-06-01
Payer: MEDICARE

## 2022-06-01 VITALS
OXYGEN SATURATION: 96 % | SYSTOLIC BLOOD PRESSURE: 120 MMHG | RESPIRATION RATE: 16 BRPM | WEIGHT: 145 LBS | BODY MASS INDEX: 22.76 KG/M2 | HEIGHT: 67 IN | TEMPERATURE: 97.6 F | DIASTOLIC BLOOD PRESSURE: 72 MMHG | HEART RATE: 64 BPM

## 2022-06-01 DIAGNOSIS — Z72.0 TOBACCO USE: ICD-10-CM

## 2022-06-01 DIAGNOSIS — E78.5 DYSLIPIDEMIA: ICD-10-CM

## 2022-06-01 DIAGNOSIS — I10 ESSENTIAL HYPERTENSION: ICD-10-CM

## 2022-06-01 DIAGNOSIS — E11.65 TYPE 2 DIABETES MELLITUS WITH HYPERGLYCEMIA, WITHOUT LONG-TERM CURRENT USE OF INSULIN (HCC): ICD-10-CM

## 2022-06-01 LAB
HBA1C MFR BLD: 8.4 % (ref 0–5.6)
INT CON NEG: NEGATIVE
INT CON POS: POSITIVE

## 2022-06-01 PROCEDURE — 83036 HEMOGLOBIN GLYCOSYLATED A1C: CPT | Performed by: FAMILY MEDICINE

## 2022-06-01 PROCEDURE — 99214 OFFICE O/P EST MOD 30 MIN: CPT | Performed by: FAMILY MEDICINE

## 2022-06-01 RX ORDER — GLIMEPIRIDE 4 MG/1
4 TABLET ORAL 2 TIMES DAILY
Qty: 180 TABLET | Refills: 1 | Status: SHIPPED | OUTPATIENT
Start: 2022-06-01 | End: 2023-06-22

## 2022-06-01 RX ORDER — GLIMEPIRIDE 4 MG/1
4 TABLET ORAL 2 TIMES DAILY
Qty: 180 TABLET | Refills: 1 | Status: SHIPPED | OUTPATIENT
Start: 2022-06-01 | End: 2022-06-01

## 2022-06-01 NOTE — PROGRESS NOTES
Subjective:     Gia Farmer is a 66 y.o. female presenting for follow-up of diabetes.  Her A1c today is 8.4%.  She continues on glimepiride and pioglitazone regularly.  She reports that she has been taking her metformin 500 mg twice a day, did not realize she is to take 1000 mg twice a day.  Denies any side effects from medications.  She reports that she has not been eating healthy, has been eating more sugary foods lately.  She was unable to see the pharmacists as it was going to cost her $90.  She continues on her other medications regularly.  She continues to smoke        Current Outpatient Medications:   •  glimepiride (AMARYL) 4 MG Tab, Take 1 Tablet by mouth 2 times a day., Disp: 180 Tablet, Rfl: 1  •  simvastatin (ZOCOR) 20 MG Tab, Take 1 Tablet by mouth every evening., Disp: 90 Tablet, Rfl: 1  •  metFORMIN ER (GLUCOPHAGE XR) 500 MG TABLET SR 24 HR, Take 2 Tablets by mouth 2 times a day., Disp: 360 Tablet, Rfl: 1  •  pioglitazone (ACTOS) 30 MG Tab, Take 1 Tablet by mouth every day., Disp: 90 Tablet, Rfl: 1  •  hydrochlorothiazide (MICROZIDE) 12.5 MG capsule, Take 1 Capsule by mouth every day., Disp: 90 Capsule, Rfl: 3  •  albuterol (PROVENTIL HFA) 108 (90 Base) MCG/ACT Aero Soln inhalation aerosol, Inhale 2 Puffs every four hours as needed for Shortness of Breath., Disp: 2 g, Rfl: 3  •  methimazole (TAPAZOLE) 5 MG Tab, Take 1 Tab by mouth 3 times a day., Disp: 270 Tab, Rfl: 1  •  clotrimazole-betamethasone (LOTRISONE) 1-0.05 % Cream, Apply thin layer to affected area twice a day as needed for rash, do not use longer than 14 days at a time., Disp: 15 g, Rfl: 1  •  Lancets Misc, Use 1x/day and PRN for signs and symptoms of high or low blood sugar. Dx E 11.65, Disp: 100 Each, Rfl: 3  •  Blood Glucose Monitoring Suppl Kit, Test blood sugar as recommended by provider.  Dx E 11.65, Disp: 1 Kit, Rfl: PRN  •  Blood Glucose Monitoring Suppl SUPPLIES Misc, For test strips: Use 1x/day and PRN for signs and  "symptoms of high or low blood sugar. Dx E 11.65, Disp: 100 Each, Rfl: 3  •  nitroglycerin (NITROSTAT) 0.4 MG SL Tab, Place 1 Tab under tongue as needed for Chest Pain., Disp: 25 Tab, Rfl: 1    Objective:     Vitals: /72   Pulse 64   Temp 36.4 °C (97.6 °F)   Resp 16   Ht 1.702 m (5' 7\")   Wt 65.8 kg (145 lb)   SpO2 96%   BMI 22.71 kg/m²   General: Alert  HEENT: Normocephalic.     Assessment/Plan:     Gia was seen today for follow-up.    Diagnoses and all orders for this visit:    Type 2 diabetes mellitus with hyperglycemia, without long-term current use of insulin (HCC)  Chronic, uncontrolled.  We discussed taking her metformin 1000 mg twice a day.  We will increase her glimepiride to 4 mg twice a day.  Follow-up in 4 months  -     POCT Hemoglobin A1C  -     glimepiride (AMARYL) 4 MG Tab; Take 1 Tablet by mouth 2 times a day.    Essential hypertension  Chronic, stable, continue hydrochlorothiazide    Dyslipidemia  Chronic, stable, continue simvastatin    Tobacco use  Advised to quit, patient is working on this        Return in about 4 months (around 10/1/2022) for routine follow up.    "

## 2022-08-04 DIAGNOSIS — E11.65 TYPE 2 DIABETES MELLITUS WITH HYPERGLYCEMIA, WITHOUT LONG-TERM CURRENT USE OF INSULIN (HCC): ICD-10-CM

## 2022-08-04 RX ORDER — METFORMIN HYDROCHLORIDE 500 MG/1
1000 TABLET, EXTENDED RELEASE ORAL 2 TIMES DAILY
Qty: 360 TABLET | Refills: 3 | Status: SHIPPED | OUTPATIENT
Start: 2022-08-04 | End: 2023-01-12 | Stop reason: SDUPTHER

## 2022-10-05 ENCOUNTER — APPOINTMENT (OUTPATIENT)
Dept: MEDICAL GROUP | Facility: MEDICAL CENTER | Age: 67
End: 2022-10-05
Payer: MEDICARE

## 2022-10-11 ENCOUNTER — HOSPITAL ENCOUNTER (OUTPATIENT)
Dept: RADIOLOGY | Facility: MEDICAL CENTER | Age: 67
End: 2022-10-11
Attending: FAMILY MEDICINE
Payer: MEDICARE

## 2022-10-11 DIAGNOSIS — Z12.31 VISIT FOR SCREENING MAMMOGRAM: ICD-10-CM

## 2022-10-11 PROCEDURE — 77063 BREAST TOMOSYNTHESIS BI: CPT

## 2022-11-02 ENCOUNTER — APPOINTMENT (OUTPATIENT)
Dept: MEDICAL GROUP | Facility: MEDICAL CENTER | Age: 67
End: 2022-11-02
Payer: MEDICARE

## 2022-11-03 ENCOUNTER — APPOINTMENT (OUTPATIENT)
Dept: MEDICAL GROUP | Facility: MEDICAL CENTER | Age: 67
End: 2022-11-03
Payer: MEDICARE

## 2022-11-09 ENCOUNTER — OFFICE VISIT (OUTPATIENT)
Dept: MEDICAL GROUP | Facility: MEDICAL CENTER | Age: 67
End: 2022-11-09
Payer: MEDICARE

## 2022-11-09 VITALS
TEMPERATURE: 97.9 F | RESPIRATION RATE: 16 BRPM | WEIGHT: 146 LBS | SYSTOLIC BLOOD PRESSURE: 126 MMHG | HEIGHT: 67 IN | BODY MASS INDEX: 22.91 KG/M2 | OXYGEN SATURATION: 97 % | DIASTOLIC BLOOD PRESSURE: 72 MMHG | HEART RATE: 64 BPM

## 2022-11-09 DIAGNOSIS — Z72.0 TOBACCO USE: ICD-10-CM

## 2022-11-09 DIAGNOSIS — I10 ESSENTIAL HYPERTENSION: ICD-10-CM

## 2022-11-09 DIAGNOSIS — E11.65 TYPE 2 DIABETES MELLITUS WITH HYPERGLYCEMIA, WITHOUT LONG-TERM CURRENT USE OF INSULIN (HCC): ICD-10-CM

## 2022-11-09 DIAGNOSIS — E78.5 DYSLIPIDEMIA: ICD-10-CM

## 2022-11-09 DIAGNOSIS — E05.00 GRAVES' DISEASE: ICD-10-CM

## 2022-11-09 LAB
HBA1C MFR BLD: 8.4 % (ref 0–5.6)
INT CON NEG: NEGATIVE
INT CON POS: POSITIVE

## 2022-11-09 PROCEDURE — 83036 HEMOGLOBIN GLYCOSYLATED A1C: CPT | Performed by: FAMILY MEDICINE

## 2022-11-09 PROCEDURE — 99214 OFFICE O/P EST MOD 30 MIN: CPT | Performed by: FAMILY MEDICINE

## 2022-11-09 NOTE — PROGRESS NOTES
"  Subjective:     Gia Farmer is a 67 y.o. female presenting for a follow up          Current Outpatient Medications:     metFORMIN ER (GLUCOPHAGE XR) 500 MG TABLET SR 24 HR, Take 2 Tablets by mouth 2 times a day., Disp: 360 Tablet, Rfl: 3    glimepiride (AMARYL) 4 MG Tab, Take 1 Tablet by mouth 2 times a day., Disp: 180 Tablet, Rfl: 1    simvastatin (ZOCOR) 20 MG Tab, Take 1 Tablet by mouth every evening., Disp: 90 Tablet, Rfl: 1    pioglitazone (ACTOS) 30 MG Tab, Take 1 Tablet by mouth every day., Disp: 90 Tablet, Rfl: 1    hydrochlorothiazide (MICROZIDE) 12.5 MG capsule, Take 1 Capsule by mouth every day., Disp: 90 Capsule, Rfl: 3    albuterol (PROVENTIL HFA) 108 (90 Base) MCG/ACT Aero Soln inhalation aerosol, Inhale 2 Puffs every four hours as needed for Shortness of Breath., Disp: 2 g, Rfl: 3    methimazole (TAPAZOLE) 5 MG Tab, Take 1 Tab by mouth 3 times a day., Disp: 270 Tab, Rfl: 1    Lancets Misc, Use 1x/day and PRN for signs and symptoms of high or low blood sugar. Dx E 11.65, Disp: 100 Each, Rfl: 3    Blood Glucose Monitoring Suppl Kit, Test blood sugar as recommended by provider.  Dx E 11.65, Disp: 1 Kit, Rfl: PRN    Blood Glucose Monitoring Suppl SUPPLIES Misc, For test strips: Use 1x/day and PRN for signs and symptoms of high or low blood sugar. Dx E 11.65, Disp: 100 Each, Rfl: 3    nitroglycerin (NITROSTAT) 0.4 MG SL Tab, Place 1 Tab under tongue as needed for Chest Pain., Disp: 25 Tab, Rfl: 1    Objective:     Vitals: /72   Pulse 64   Temp 36.6 °C (97.9 °F)   Resp 16   Ht 1.702 m (5' 7\")   Wt 66.2 kg (146 lb)   SpO2 97%   BMI 22.87 kg/m²   General: Alert  HEENT: Normocephalic.     Assessment/Plan:     Gia was seen today for follow-up.    Diagnoses and all orders for this visit:    Type 2 diabetes mellitus with hyperglycemia, without long-term current use of insulin (HCC)  Chronic, uncontrolled.  She reports taking her medications regularly.  Her diet has not she has not been " watching her diet as carefully.  We discussed with our diabetes nurse about medication options, patient assistance, Farxiga may be an option for her.  Paperwork printed for patient to fill out  -     POCT Hemoglobin A1C  -     CBC WITHOUT DIFFERENTIAL; Future  -     Basic Metabolic Panel; Future  -     MICROALBUMIN CREAT RATIO URINE; Future    Essential hypertension  Chronic, stable, continue hydrochlorothiazide  -     CBC WITHOUT DIFFERENTIAL; Future  -     Basic Metabolic Panel; Future    Dyslipidemia  Chronic, stable, continue simvastatin. Consider switching to rosuvastatin at her next visit  -     Basic Metabolic Panel; Future  -     Lipid Profile; Future    Tobacco use  Advised to quit, patient is working on this    Graves' disease  Chronic, stable, she continues on methimazole.  She declines to see endocrinology.  -     TSH; Future        Return in about 4 months (around 3/9/2023) for routine follow up.

## 2022-11-15 ENCOUNTER — APPOINTMENT (OUTPATIENT)
Dept: MEDICAL GROUP | Facility: MEDICAL CENTER | Age: 67
End: 2022-11-15
Payer: MEDICARE

## 2022-12-08 DIAGNOSIS — E11.9 TYPE 2 DIABETES MELLITUS WITHOUT COMPLICATION, WITHOUT LONG-TERM CURRENT USE OF INSULIN (HCC): ICD-10-CM

## 2022-12-08 RX ORDER — DAPAGLIFLOZIN 10 MG/1
10 TABLET, FILM COATED ORAL DAILY
Qty: 90 TABLET | Refills: 3 | Status: SHIPPED | OUTPATIENT
Start: 2022-12-08 | End: 2022-12-08

## 2022-12-08 RX ORDER — DAPAGLIFLOZIN 10 MG/1
10 TABLET, FILM COATED ORAL DAILY
Qty: 90 TABLET | Refills: 3 | Status: SHIPPED
Start: 2022-12-08 | End: 2023-06-22

## 2023-01-06 DIAGNOSIS — E11.9 TYPE 2 DIABETES MELLITUS WITHOUT COMPLICATION, WITHOUT LONG-TERM CURRENT USE OF INSULIN (HCC): ICD-10-CM

## 2023-01-09 RX ORDER — PIOGLITAZONEHYDROCHLORIDE 30 MG/1
30 TABLET ORAL DAILY
Qty: 90 TABLET | Refills: 1 | Status: SHIPPED | OUTPATIENT
Start: 2023-01-06 | End: 2023-08-16

## 2023-01-12 DIAGNOSIS — E11.65 TYPE 2 DIABETES MELLITUS WITH HYPERGLYCEMIA, WITHOUT LONG-TERM CURRENT USE OF INSULIN (HCC): ICD-10-CM

## 2023-01-12 PROCEDURE — RXMED WILLOW AMBULATORY MEDICATION CHARGE: Performed by: FAMILY MEDICINE

## 2023-01-13 PROCEDURE — RXMED WILLOW AMBULATORY MEDICATION CHARGE: Performed by: FAMILY MEDICINE

## 2023-01-13 RX ORDER — HYDROCHLOROTHIAZIDE 12.5 MG/1
12.5 CAPSULE, GELATIN COATED ORAL DAILY
Qty: 90 CAPSULE | Refills: 3 | Status: SHIPPED | OUTPATIENT
Start: 2023-01-13 | End: 2023-12-06

## 2023-01-13 RX ORDER — METFORMIN HYDROCHLORIDE 500 MG/1
1000 TABLET, EXTENDED RELEASE ORAL 2 TIMES DAILY
Qty: 360 TABLET | Refills: 3 | Status: SHIPPED | OUTPATIENT
Start: 2023-01-13 | End: 2023-08-16

## 2023-01-20 ENCOUNTER — PHARMACY VISIT (OUTPATIENT)
Dept: PHARMACY | Facility: MEDICAL CENTER | Age: 68
End: 2023-01-20
Payer: COMMERCIAL

## 2023-03-21 ENCOUNTER — APPOINTMENT (OUTPATIENT)
Dept: MEDICAL GROUP | Facility: MEDICAL CENTER | Age: 68
End: 2023-03-21
Payer: MEDICARE

## 2023-04-12 ENCOUNTER — OFFICE VISIT (OUTPATIENT)
Dept: MEDICAL GROUP | Facility: MEDICAL CENTER | Age: 68
End: 2023-04-12
Payer: MEDICARE

## 2023-04-12 VITALS
RESPIRATION RATE: 16 BRPM | OXYGEN SATURATION: 97 % | BODY MASS INDEX: 19.93 KG/M2 | HEIGHT: 67 IN | DIASTOLIC BLOOD PRESSURE: 70 MMHG | WEIGHT: 127 LBS | TEMPERATURE: 97.9 F | SYSTOLIC BLOOD PRESSURE: 122 MMHG

## 2023-04-12 DIAGNOSIS — I10 ESSENTIAL HYPERTENSION: ICD-10-CM

## 2023-04-12 DIAGNOSIS — E78.5 DYSLIPIDEMIA: ICD-10-CM

## 2023-04-12 DIAGNOSIS — Z12.11 SCREEN FOR COLON CANCER: ICD-10-CM

## 2023-04-12 DIAGNOSIS — E05.00 GRAVES' DISEASE: ICD-10-CM

## 2023-04-12 DIAGNOSIS — E11.65 TYPE 2 DIABETES MELLITUS WITH HYPERGLYCEMIA, WITH LONG-TERM CURRENT USE OF INSULIN (HCC): ICD-10-CM

## 2023-04-12 DIAGNOSIS — Z79.4 TYPE 2 DIABETES MELLITUS WITH HYPERGLYCEMIA, WITH LONG-TERM CURRENT USE OF INSULIN (HCC): ICD-10-CM

## 2023-04-12 LAB
HBA1C MFR BLD: 10.9 % (ref ?–5.8)
POCT INT CON NEG: NEGATIVE
POCT INT CON POS: POSITIVE

## 2023-04-12 PROCEDURE — 99214 OFFICE O/P EST MOD 30 MIN: CPT | Performed by: FAMILY MEDICINE

## 2023-04-12 PROCEDURE — 83036 HEMOGLOBIN GLYCOSYLATED A1C: CPT | Performed by: FAMILY MEDICINE

## 2023-04-12 PROCEDURE — RXMED WILLOW AMBULATORY MEDICATION CHARGE: Performed by: FAMILY MEDICINE

## 2023-04-12 RX ORDER — LANCETS 30 GAUGE
EACH MISCELLANEOUS
Qty: 100 EACH | Refills: 3 | Status: SHIPPED | OUTPATIENT
Start: 2023-04-12

## 2023-04-12 RX ORDER — INSULIN GLARGINE 100 [IU]/ML
10 INJECTION, SOLUTION SUBCUTANEOUS EVERY EVENING
Qty: 9 ML | Refills: 0 | Status: SHIPPED | OUTPATIENT
Start: 2023-04-12 | End: 2023-06-15 | Stop reason: SDUPTHER

## 2023-04-12 RX ORDER — CALCIUM CITRATE/VITAMIN D3 200MG-6.25
TABLET ORAL
Qty: 300 STRIP | Refills: 11 | Status: SHIPPED | OUTPATIENT
Start: 2023-04-12

## 2023-04-12 ASSESSMENT — PATIENT HEALTH QUESTIONNAIRE - PHQ9: CLINICAL INTERPRETATION OF PHQ2 SCORE: 0

## 2023-04-12 NOTE — PROGRESS NOTES
Subjective:     Gia Farmer is a 67 y.o. female presenting for a follow-up.  She reports significant weight loss over the past several months, has been stabilized more recently.  She feels like it may be due to her thyroid.  Is willing to see endocrinology, needs a new referral.  Her diabetes remains elevated.  Her A1c today is 10.9%.  She continues on glimepiride, metformin, pioglitazone.  She never received the Farxiga.        Current Outpatient Medications:     insulin glargine (LANTUS SOLOSTAR) 100 UNIT/ML Solution Pen-injector injection, Inject 10 Units under the skin every evening., Disp: 9 mL, Rfl: 0    Blood Glucose Meter Kit, Test blood sugar as recommended by provider.  Dx E 11.65, Disp: 1 Kit, Rfl: PRN    Blood Glucose Test Strips, For test strips: Use 3x/day and PRN for signs and symptoms of high or low blood sugar. Dx E 11.65, Disp: 300 Strip, Rfl: 11    Lancets, Use 3x/day and PRN for signs and symptoms of high or low blood sugar. Dx E 11.65, Disp: 100 Each, Rfl: 3    hydrochlorothiazide (MICROZIDE) 12.5 MG capsule, Take 1 Capsule by mouth every day., Disp: 90 Capsule, Rfl: 3    metFORMIN ER (GLUCOPHAGE XR) 500 MG TABLET SR 24 HR, Take 2 Tablets by mouth 2 times a day., Disp: 360 Tablet, Rfl: 3    pioglitazone (ACTOS) 30 MG Tab, Take 1 Tablet by mouth every day., Disp: 90 Tablet, Rfl: 1    glimepiride (AMARYL) 4 MG Tab, Take 1 Tablet by mouth 2 times a day., Disp: 180 Tablet, Rfl: 1    simvastatin (ZOCOR) 20 MG Tab, Take 1 Tablet by mouth every evening., Disp: 90 Tablet, Rfl: 1    albuterol (PROVENTIL HFA) 108 (90 Base) MCG/ACT Aero Soln inhalation aerosol, Inhale 2 Puffs every four hours as needed for Shortness of Breath., Disp: 2 g, Rfl: 3    methimazole (TAPAZOLE) 5 MG Tab, Take 1 Tab by mouth 3 times a day., Disp: 270 Tab, Rfl: 1    nitroglycerin (NITROSTAT) 0.4 MG SL Tab, Place 1 Tab under tongue as needed for Chest Pain., Disp: 25 Tab, Rfl: 1    dapagliflozin propanediol (FARXIGA) 10 MG  "Tab, Take 1 Tablet by mouth every day., Disp: 90 Tablet, Rfl: 3    Objective:     Vitals: /70   Temp 36.6 °C (97.9 °F)   Resp 16   Ht 1.702 m (5' 7\")   Wt 57.6 kg (127 lb)   SpO2 97%   BMI 19.89 kg/m²   General: Alert  HEENT: Normocephalic.   Heart: Regular rate and rhythm.  S1 and S2 normal.  No murmurs appreciated.  Respiratory: Normal respiratory effort.  Clear to auscultation bilaterally.  Abdomen: Non-distended, soft  Extremities: Distal pulses 2+ symmetric.  No edema, skin lesions; toe nails clean.  Normal monofilament.  Achilles reflexes 2+ symmetric.      Assessment/Plan:     Diagnoses and all orders for this visit:    Type 2 diabetes mellitus with hyperglycemia, with long-term current use of insulin (HCC)  Chronic, uncontrolled, is worsening.  We will resubmit the paperwork for Farxiga again.  Continue metformin, pioglitazone, glimepiride.  We discussed starting Lantus.  We discussed potential for hypoglycemic episodes.  Foot exam done today, eye exam records requested.  Follow-up in a month  -     POCT Hemoglobin A1C  -     Diabetic Monofilament LE Exam  -     Referral to Endocrinology  -     Blood Glucose Meter Kit; Test blood sugar as recommended by provider.  Dx E 11.65  -     Blood Glucose Test Strips; For test strips: Use 3x/day and PRN for signs and symptoms of high or low blood sugar. Dx E 11.65  -     Lancets; Use 3x/day and PRN for signs and symptoms of high or low blood sugar. Dx E 11.65  -     insulin glargine (LANTUS SOLOSTAR) 100 UNIT/ML Solution Pen-injector injection; Inject 10 Units under the skin every evening.    Dyslipidemia  Chronic, stable, she plans to do her labs soon continue simvastatin    Essential hypertension  Chronic, stable, continue hydrochlorothiazide    Graves' disease  -     Referral to Endocrinology    Screen for colon cancer  -     JJ (FIT DNA)      Return in about 4 weeks (around 5/10/2023) for Med check.      "

## 2023-04-12 NOTE — LETTER
[facility]  Kindra Gutierrez M.D.  [address]  [place] [address]  Fax: [phone]   Authorization for Release/Disclosure of   Protected Health Information   Name: GIA FARMER : 1955 SSN: [id number]   Address: [facility] [address]  [place] [address] Phone:    [phone] (home)    I authorize the entity listed below to release/disclose the PHI below to:   [facility]/Kindra Gutierrez M.D. and Kindra Gutierrez M.D.   Provider or Entity Name:  Dr quintero   Address   City, State, [facility]   Phone:      Fax:     Reason for request: continuity of care   Information to be released:    [  ] LAST COLONOSCOPY,  including any PATH REPORT and follow-up  [  ] LAST FIT/COLOGUARD RESULT [  ] LAST DEXA  [  ] LAST MAMMOGRAM  [  ] LAST PAP  [  ] LAST LABS [ x] RETINA EXAM REPORT  [  ] IMMUNIZATION RECORDS  [  ] Release all info      [  ] Check here and initial the line next to each item to release ALL health information INCLUDING  _____ Care and treatment for drug and / or alcohol abuse  _____ HIV testing, infection status, or AIDS  _____ Genetic Testing    DATES OF SERVICE OR TIME PERIOD TO BE DISCLOSED: _____________  I understand and acknowledge that:  * This Authorization may be revoked at any time by you in writing, except if your health information has already been used or disclosed.  * Your health information that will be used or disclosed as a result of you signing this authorization could be re-disclosed by the recipient. If this occurs, your re-disclosed health information may no longer be protected by State or Federal laws.  * You may refuse to sign this Authorization. Your refusal will not affect your ability to obtain treatment.  * This Authorization becomes effective upon signing and will  on (date) __________.      If no date is indicated, this Authorization will  one (1) year from the signature date.    Name: Gia Farmer  Signature: Date:   2023     PLEASE FAX  REQUESTED RECORDS BACK TO: [phone]

## 2023-05-01 ENCOUNTER — TELEPHONE (OUTPATIENT)
Dept: MEDICAL GROUP | Facility: MEDICAL CENTER | Age: 68
End: 2023-05-01
Payer: MEDICARE

## 2023-05-19 ENCOUNTER — HOSPITAL ENCOUNTER (OUTPATIENT)
Dept: LAB | Facility: MEDICAL CENTER | Age: 68
End: 2023-05-19
Attending: FAMILY MEDICINE
Payer: MEDICARE

## 2023-05-19 DIAGNOSIS — I10 ESSENTIAL HYPERTENSION: ICD-10-CM

## 2023-05-19 DIAGNOSIS — E05.00 GRAVES' DISEASE: ICD-10-CM

## 2023-05-19 DIAGNOSIS — E11.65 TYPE 2 DIABETES MELLITUS WITH HYPERGLYCEMIA, WITHOUT LONG-TERM CURRENT USE OF INSULIN (HCC): ICD-10-CM

## 2023-05-19 DIAGNOSIS — E78.5 DYSLIPIDEMIA: ICD-10-CM

## 2023-05-19 LAB
ANION GAP SERPL CALC-SCNC: 13 MMOL/L (ref 7–16)
BUN SERPL-MCNC: 15 MG/DL (ref 8–22)
CALCIUM SERPL-MCNC: 9.1 MG/DL (ref 8.5–10.5)
CHLORIDE SERPL-SCNC: 105 MMOL/L (ref 96–112)
CHOLEST SERPL-MCNC: 179 MG/DL (ref 100–199)
CO2 SERPL-SCNC: 25 MMOL/L (ref 20–33)
CREAT SERPL-MCNC: 0.83 MG/DL (ref 0.5–1.4)
ERYTHROCYTE [DISTWIDTH] IN BLOOD BY AUTOMATED COUNT: 42.9 FL (ref 35.9–50)
FASTING STATUS PATIENT QL REPORTED: NORMAL
GFR SERPLBLD CREATININE-BSD FMLA CKD-EPI: 78 ML/MIN/1.73 M 2
GLUCOSE SERPL-MCNC: 126 MG/DL (ref 65–99)
HCT VFR BLD AUTO: 50.8 % (ref 37–47)
HDLC SERPL-MCNC: 67 MG/DL
HGB BLD-MCNC: 17.2 G/DL (ref 12–16)
LDLC SERPL CALC-MCNC: 102 MG/DL
MCH RBC QN AUTO: 31.6 PG (ref 27–33)
MCHC RBC AUTO-ENTMCNC: 33.9 G/DL (ref 33.6–35)
MCV RBC AUTO: 93.4 FL (ref 81.4–97.8)
PLATELET # BLD AUTO: 234 K/UL (ref 164–446)
PMV BLD AUTO: 11 FL (ref 9–12.9)
POTASSIUM SERPL-SCNC: 4 MMOL/L (ref 3.6–5.5)
RBC # BLD AUTO: 5.44 M/UL (ref 4.2–5.4)
SODIUM SERPL-SCNC: 143 MMOL/L (ref 135–145)
TRIGL SERPL-MCNC: 50 MG/DL (ref 0–149)
TSH SERPL DL<=0.005 MIU/L-ACNC: <0.005 UIU/ML (ref 0.38–5.33)
WBC # BLD AUTO: 4.7 K/UL (ref 4.8–10.8)

## 2023-05-19 PROCEDURE — 36415 COLL VENOUS BLD VENIPUNCTURE: CPT

## 2023-05-19 PROCEDURE — 80048 BASIC METABOLIC PNL TOTAL CA: CPT

## 2023-05-19 PROCEDURE — 80061 LIPID PANEL: CPT

## 2023-05-19 PROCEDURE — 85027 COMPLETE CBC AUTOMATED: CPT

## 2023-05-19 PROCEDURE — 84443 ASSAY THYROID STIM HORMONE: CPT

## 2023-05-22 ENCOUNTER — APPOINTMENT (OUTPATIENT)
Dept: MEDICAL GROUP | Facility: MEDICAL CENTER | Age: 68
End: 2023-05-22
Payer: MEDICARE

## 2023-05-23 DIAGNOSIS — R19.5 POSITIVE COLORECTAL CANCER SCREENING USING COLOGUARD TEST: ICD-10-CM

## 2023-05-25 NOTE — RESULT ENCOUNTER NOTE
Discussed all results with patient, smoking cessation and follow up referrals. Phone numbers given to make appointments.

## 2023-06-12 ENCOUNTER — PHARMACY VISIT (OUTPATIENT)
Dept: PHARMACY | Facility: MEDICAL CENTER | Age: 68
End: 2023-06-12
Payer: MEDICARE

## 2023-06-16 PROCEDURE — RXMED WILLOW AMBULATORY MEDICATION CHARGE: Performed by: FAMILY MEDICINE

## 2023-06-16 RX ORDER — INSULIN GLARGINE 100 [IU]/ML
10 INJECTION, SOLUTION SUBCUTANEOUS EVERY EVENING
Qty: 9 ML | Refills: 0 | Status: SHIPPED | OUTPATIENT
Start: 2023-06-16 | End: 2023-09-19 | Stop reason: SDUPTHER

## 2023-06-20 ENCOUNTER — PHARMACY VISIT (OUTPATIENT)
Dept: PHARMACY | Facility: MEDICAL CENTER | Age: 68
End: 2023-06-20
Payer: MEDICARE

## 2023-06-22 ENCOUNTER — OFFICE VISIT (OUTPATIENT)
Dept: MEDICAL GROUP | Facility: MEDICAL CENTER | Age: 68
End: 2023-06-22
Payer: MEDICARE

## 2023-06-22 ENCOUNTER — HOSPITAL ENCOUNTER (OUTPATIENT)
Facility: MEDICAL CENTER | Age: 68
End: 2023-06-22
Attending: FAMILY MEDICINE
Payer: MEDICARE

## 2023-06-22 VITALS
OXYGEN SATURATION: 98 % | WEIGHT: 130 LBS | DIASTOLIC BLOOD PRESSURE: 72 MMHG | RESPIRATION RATE: 16 BRPM | HEART RATE: 68 BPM | BODY MASS INDEX: 20.4 KG/M2 | SYSTOLIC BLOOD PRESSURE: 124 MMHG | HEIGHT: 67 IN | TEMPERATURE: 97.6 F

## 2023-06-22 DIAGNOSIS — E78.5 DYSLIPIDEMIA: ICD-10-CM

## 2023-06-22 DIAGNOSIS — E11.65 TYPE 2 DIABETES MELLITUS WITH HYPERGLYCEMIA, WITH LONG-TERM CURRENT USE OF INSULIN (HCC): ICD-10-CM

## 2023-06-22 DIAGNOSIS — E05.00 GRAVES' DISEASE: ICD-10-CM

## 2023-06-22 DIAGNOSIS — R19.5 POSITIVE COLORECTAL CANCER SCREENING USING COLOGUARD TEST: ICD-10-CM

## 2023-06-22 DIAGNOSIS — Z79.4 TYPE 2 DIABETES MELLITUS WITH HYPERGLYCEMIA, WITH LONG-TERM CURRENT USE OF INSULIN (HCC): ICD-10-CM

## 2023-06-22 DIAGNOSIS — I10 ESSENTIAL HYPERTENSION: ICD-10-CM

## 2023-06-22 LAB — AMBIGUOUS DTTM AMBI4: NORMAL

## 2023-06-22 PROCEDURE — 92250 FUNDUS PHOTOGRAPHY W/I&R: CPT | Mod: TC | Performed by: FAMILY MEDICINE

## 2023-06-22 PROCEDURE — 99214 OFFICE O/P EST MOD 30 MIN: CPT | Performed by: FAMILY MEDICINE

## 2023-06-22 PROCEDURE — 82043 UR ALBUMIN QUANTITATIVE: CPT

## 2023-06-22 PROCEDURE — 82570 ASSAY OF URINE CREATININE: CPT

## 2023-06-22 PROCEDURE — 3078F DIAST BP <80 MM HG: CPT | Performed by: FAMILY MEDICINE

## 2023-06-22 PROCEDURE — 3074F SYST BP LT 130 MM HG: CPT | Performed by: FAMILY MEDICINE

## 2023-06-22 RX ORDER — METHIMAZOLE 10 MG/1
10 TABLET ORAL 2 TIMES DAILY
Qty: 180 TABLET | Refills: 1 | Status: SHIPPED
Start: 2023-06-22 | End: 2023-06-22

## 2023-06-22 RX ORDER — METHIMAZOLE 10 MG/1
10 TABLET ORAL 2 TIMES DAILY
Qty: 180 TABLET | Refills: 1 | Status: SHIPPED | OUTPATIENT
Start: 2023-06-22 | End: 2023-08-16 | Stop reason: SDUPTHER

## 2023-06-22 RX ORDER — GLIMEPIRIDE 4 MG/1
4 TABLET ORAL EVERY MORNING
COMMUNITY
Start: 2023-06-22 | End: 2023-08-16

## 2023-06-22 ASSESSMENT — FIBROSIS 4 INDEX: FIB4 SCORE: 1.443375672974064411

## 2023-06-22 NOTE — PROGRESS NOTES
"  Subjective:     Gia Farmer is a 67 y.o. female presenting for a follow-up        Current Outpatient Medications:     glimepiride (AMARYL) 4 MG Tab, Take 1 Tablet by mouth every morning., Disp: , Rfl:     methimazole (TAPAZOLE) 10 MG Tab, Take 1 Tablet by mouth 2 times a day., Disp: 180 Tablet, Rfl: 1    methimazole (TAPAZOLE) 10 MG Tab, Take 1 Tablet by mouth 2 times a day., Disp: 180 Tablet, Rfl: 1    insulin glargine (LANTUS SOLOSTAR) 100 UNIT/ML Solution Pen-injector injection, Inject 10 Units under the skin every evening., Disp: 9 mL, Rfl: 0    Blood Glucose Monitoring Suppl (TRUE METRIX METER) w/Device Kit, Test blood sugar as recommended by provider.  Dx E 11.65, Disp: 1 Kit, Rfl: PRN    glucose blood (TRUE METRIX BLOOD GLUCOSE TEST) strip, For test strips: Use 3x/day and as needed for signs and symptoms of high or low blood sugar. Dx E 11.65, Disp: 300 Strip, Rfl: 11    Lancets, Use 3 times daily and as needed for signs and symptoms of high or low blood sugar. Dx E 11.65, Disp: 100 Each, Rfl: 3    hydrochlorothiazide (MICROZIDE) 12.5 MG capsule, Take 1 Capsule by mouth every day., Disp: 90 Capsule, Rfl: 3    metFORMIN ER (GLUCOPHAGE XR) 500 MG TABLET SR 24 HR, Take 2 Tablets by mouth 2 times a day., Disp: 360 Tablet, Rfl: 3    pioglitazone (ACTOS) 30 MG Tab, Take 1 Tablet by mouth every day., Disp: 90 Tablet, Rfl: 1    simvastatin (ZOCOR) 20 MG Tab, Take 1 Tablet by mouth every evening., Disp: 90 Tablet, Rfl: 1    albuterol (PROVENTIL HFA) 108 (90 Base) MCG/ACT Aero Soln inhalation aerosol, Inhale 2 Puffs every four hours as needed for Shortness of Breath., Disp: 2 g, Rfl: 3    nitroglycerin (NITROSTAT) 0.4 MG SL Tab, Place 1 Tab under tongue as needed for Chest Pain., Disp: 25 Tab, Rfl: 1    Objective:     Vitals: /72   Pulse 68   Temp 36.4 °C (97.6 °F)   Resp 16   Ht 1.702 m (5' 7\")   Wt 59 kg (130 lb)   SpO2 98%   BMI 20.36 kg/m²   General: Alert  HEENT: Normocephalic.   Heart: " Regular rate and rhythm.  S1 and S2 normal.  No murmurs appreciated.  Respiratory: Normal respiratory effort.  Clear to auscultation bilaterally.  Abdomen: Non-distended, soft  Extremities: No leg edema.    Assessment/Plan:     Diagnoses and all orders for this visit:    Type 2 diabetes mellitus with hyperglycemia, with long-term current use of insulin (HCC)  Chronic, stable.  She reports that her sugars have improved, is taking glimepiride, Lantus, metformin, pioglitazone.  She never received Farxiga.  She has had 2 episodes of hypoglycemic episodes.  We discussed reducing her glimepiride to 4 mg once a day.  Follow-up in a month  -     MICROALBUMIN CREAT RATIO URINE; Future  -     POCT Retinal Eye Exam    Essential hypertension  Chronic, stable, continue hydrochlorothiazide    Dyslipidemia  Chronic, stable, continue simvastatin    Graves' disease  Chronic, uncontrolled.  She reports that she had not been taking her methimazole.  New prescription sent.  She plans to schedule with endocrinology soon.  Follow-up in a month  -     methimazole (TAPAZOLE) 10 MG Tab; Take 1 Tablet by mouth 2 times a day.    Positive colorectal cancer screening using Cologuard test  Patient reports that she has an appointment with GI in July.        Return in about 4 weeks (around 7/20/2023) for routine follow up.

## 2023-06-23 LAB
CREAT UR-MCNC: 115.59 MG/DL
MICROALBUMIN UR-MCNC: <1.2 MG/DL
MICROALBUMIN/CREAT UR: NORMAL MG/G (ref 0–30)

## 2023-07-13 PROCEDURE — RXMED WILLOW AMBULATORY MEDICATION CHARGE

## 2023-07-17 ENCOUNTER — PHARMACY VISIT (OUTPATIENT)
Dept: PHARMACY | Facility: MEDICAL CENTER | Age: 68
End: 2023-07-17
Payer: MEDICARE

## 2023-07-25 ENCOUNTER — APPOINTMENT (OUTPATIENT)
Dept: MEDICAL GROUP | Facility: MEDICAL CENTER | Age: 68
End: 2023-07-25
Payer: MEDICARE

## 2023-08-04 ENCOUNTER — APPOINTMENT (OUTPATIENT)
Dept: MEDICAL GROUP | Facility: MEDICAL CENTER | Age: 68
End: 2023-08-04
Payer: MEDICARE

## 2023-08-15 ENCOUNTER — APPOINTMENT (OUTPATIENT)
Dept: MEDICAL GROUP | Facility: MEDICAL CENTER | Age: 68
End: 2023-08-15
Payer: MEDICARE

## 2023-08-15 PROBLEM — R19.5 POSITIVE COLORECTAL CANCER SCREENING USING COLOGUARD TEST: Status: RESOLVED | Noted: 2023-05-23 | Resolved: 2023-08-15

## 2023-08-16 ENCOUNTER — OFFICE VISIT (OUTPATIENT)
Dept: MEDICAL GROUP | Facility: MEDICAL CENTER | Age: 68
End: 2023-08-16
Payer: MEDICARE

## 2023-08-16 VITALS
OXYGEN SATURATION: 97 % | HEIGHT: 67 IN | TEMPERATURE: 97.3 F | HEART RATE: 105 BPM | BODY MASS INDEX: 19.81 KG/M2 | DIASTOLIC BLOOD PRESSURE: 74 MMHG | SYSTOLIC BLOOD PRESSURE: 122 MMHG | WEIGHT: 126.2 LBS | RESPIRATION RATE: 18 BRPM

## 2023-08-16 DIAGNOSIS — E11.9 TYPE 2 DIABETES MELLITUS WITHOUT COMPLICATION, WITHOUT LONG-TERM CURRENT USE OF INSULIN (HCC): ICD-10-CM

## 2023-08-16 DIAGNOSIS — E78.5 DYSLIPIDEMIA: ICD-10-CM

## 2023-08-16 DIAGNOSIS — R19.5 POSITIVE COLORECTAL CANCER SCREENING USING COLOGUARD TEST: ICD-10-CM

## 2023-08-16 DIAGNOSIS — I10 ESSENTIAL HYPERTENSION: ICD-10-CM

## 2023-08-16 DIAGNOSIS — E05.00 GRAVES' DISEASE: ICD-10-CM

## 2023-08-16 LAB
HBA1C MFR BLD: 7.1 % (ref ?–5.8)
POCT INT CON NEG: NEGATIVE
POCT INT CON POS: POSITIVE

## 2023-08-16 PROCEDURE — 83036 HEMOGLOBIN GLYCOSYLATED A1C: CPT | Performed by: FAMILY MEDICINE

## 2023-08-16 PROCEDURE — 3074F SYST BP LT 130 MM HG: CPT | Performed by: FAMILY MEDICINE

## 2023-08-16 PROCEDURE — 3078F DIAST BP <80 MM HG: CPT | Performed by: FAMILY MEDICINE

## 2023-08-16 PROCEDURE — 99214 OFFICE O/P EST MOD 30 MIN: CPT | Performed by: FAMILY MEDICINE

## 2023-08-16 RX ORDER — METHIMAZOLE 10 MG/1
10 TABLET ORAL 2 TIMES DAILY
Qty: 180 TABLET | Refills: 1 | Status: SHIPPED
Start: 2023-08-16 | End: 2023-10-12 | Stop reason: SDUPTHER

## 2023-08-16 ASSESSMENT — FIBROSIS 4 INDEX: FIB4 SCORE: 1.443375672974064411

## 2023-08-16 NOTE — PROGRESS NOTES
"  Subjective:     Gia Farmer is a 67 y.o. female presenting for a follow up          Current Outpatient Medications:     methimazole (TAPAZOLE) 10 MG Tab, Take 1 Tablet by mouth 2 times a day., Disp: 180 Tablet, Rfl: 1    Misc. Devices Misc, One glucerna daily, Disp: 90 Each, Rfl: 3    insulin glargine (LANTUS SOLOSTAR) 100 UNIT/ML Solution Pen-injector injection, Inject 10 Units under the skin every evening., Disp: 9 mL, Rfl: 0    Blood Glucose Monitoring Suppl (TRUE METRIX METER) w/Device Kit, Test blood sugar as recommended by provider.  Dx E 11.65, Disp: 1 Kit, Rfl: PRN    glucose blood (TRUE METRIX BLOOD GLUCOSE TEST) strip, For test strips: Use 3x/day and as needed for signs and symptoms of high or low blood sugar. Dx E 11.65, Disp: 300 Strip, Rfl: 11    Lancets, Use 3 times daily and as needed for signs and symptoms of high or low blood sugar. Dx E 11.65, Disp: 100 Each, Rfl: 3    hydrochlorothiazide (MICROZIDE) 12.5 MG capsule, Take 1 Capsule by mouth every day., Disp: 90 Capsule, Rfl: 3    simvastatin (ZOCOR) 20 MG Tab, Take 1 Tablet by mouth every evening., Disp: 90 Tablet, Rfl: 1    albuterol (PROVENTIL HFA) 108 (90 Base) MCG/ACT Aero Soln inhalation aerosol, Inhale 2 Puffs every four hours as needed for Shortness of Breath., Disp: 2 g, Rfl: 3    nitroglycerin (NITROSTAT) 0.4 MG SL Tab, Place 1 Tab under tongue as needed for Chest Pain., Disp: 25 Tab, Rfl: 1    Objective:     Vitals: /74 (BP Location: Left arm, Patient Position: Sitting, BP Cuff Size: Adult)   Pulse (!) 105   Temp 36.3 °C (97.3 °F) (Temporal)   Resp 18   Ht 1.702 m (5' 7\")   Wt 57.2 kg (126 lb 3.2 oz)   SpO2 97%   BMI 19.77 kg/m²   General: Alert  HEENT: Normocephalic.       Assessment/Plan:     Diagnoses and all orders for this visit:    Type 2 diabetes mellitus without complication, without long-term current use of insulin (HCC)  Chronic, stable.  Her A1c improved to 7.1%.  She reports she has been taking Lantus " daily.  She stopped the glimepiride, metformin, pioglitazone.  We will continue with Lantus.  Follow-up in 3-4 months  -     POCT Hemoglobin A1C    Essential hypertension  Chronic, stable, continue hydrochlorothiazide    Dyslipidemia  Chronic, stable, continue simvastatin.    Graves' disease  Chronic, uncontrolled.  She has not been taking her methimazole regularly due to cost.  Coupon printed for patient to see if that may help.  -     methimazole (TAPAZOLE) 10 MG Tab; Take 1 Tablet by mouth 2 times a day.    Positive colorectal cancer screening using Cologuard test  She recently underwent a colonoscopy with GI, she reports that she will need another colonoscopy soon as there was 1 large polyp that was unable to be removed.        Return in about 4 months (around 12/16/2023) for a routine follow up.

## 2023-09-04 PROCEDURE — RXMED WILLOW AMBULATORY MEDICATION CHARGE

## 2023-09-19 PROCEDURE — RXMED WILLOW AMBULATORY MEDICATION CHARGE: Performed by: FAMILY MEDICINE

## 2023-09-19 RX ORDER — INSULIN GLARGINE 100 [IU]/ML
10 INJECTION, SOLUTION SUBCUTANEOUS EVERY EVENING
Qty: 9 ML | Refills: 0 | Status: SHIPPED | OUTPATIENT
Start: 2023-09-19 | End: 2023-12-18 | Stop reason: SDUPTHER

## 2023-09-20 ENCOUNTER — PHARMACY VISIT (OUTPATIENT)
Dept: PHARMACY | Facility: MEDICAL CENTER | Age: 68
End: 2023-09-20
Payer: MEDICARE

## 2023-10-12 ENCOUNTER — OFFICE VISIT (OUTPATIENT)
Dept: MEDICAL GROUP | Facility: MEDICAL CENTER | Age: 68
End: 2023-10-12
Payer: MEDICARE

## 2023-10-12 VITALS
OXYGEN SATURATION: 96 % | SYSTOLIC BLOOD PRESSURE: 156 MMHG | TEMPERATURE: 97.9 F | DIASTOLIC BLOOD PRESSURE: 92 MMHG | HEART RATE: 91 BPM | BODY MASS INDEX: 18.87 KG/M2 | WEIGHT: 120.2 LBS | HEIGHT: 67 IN

## 2023-10-12 DIAGNOSIS — Z12.31 ENCOUNTER FOR SCREENING MAMMOGRAM FOR MALIGNANT NEOPLASM OF BREAST: ICD-10-CM

## 2023-10-12 DIAGNOSIS — I10 ESSENTIAL HYPERTENSION: ICD-10-CM

## 2023-10-12 DIAGNOSIS — R00.2 PALPITATIONS: ICD-10-CM

## 2023-10-12 DIAGNOSIS — E05.00 GRAVES' DISEASE: ICD-10-CM

## 2023-10-12 PROCEDURE — 3077F SYST BP >= 140 MM HG: CPT

## 2023-10-12 PROCEDURE — 3080F DIAST BP >= 90 MM HG: CPT

## 2023-10-12 PROCEDURE — RXMED WILLOW AMBULATORY MEDICATION CHARGE

## 2023-10-12 PROCEDURE — 99214 OFFICE O/P EST MOD 30 MIN: CPT

## 2023-10-12 RX ORDER — ATENOLOL 25 MG/1
25 TABLET ORAL DAILY
Qty: 90 TABLET | Refills: 3 | Status: SHIPPED | OUTPATIENT
Start: 2023-10-12

## 2023-10-12 RX ORDER — METHIMAZOLE 10 MG/1
10 TABLET ORAL 2 TIMES DAILY
Qty: 180 TABLET | Refills: 3 | Status: SHIPPED | OUTPATIENT
Start: 2023-10-12

## 2023-10-12 ASSESSMENT — FIBROSIS 4 INDEX: FIB4 SCORE: 1.47

## 2023-10-12 ASSESSMENT — ENCOUNTER SYMPTOMS
CHILLS: 0
COUGH: 0
SHORTNESS OF BREATH: 0
PALPITATIONS: 0
FEVER: 0
ORTHOPNEA: 0

## 2023-10-12 NOTE — PROGRESS NOTES
Subjective:     CC: Establish Care (Referral for thyroid. )      HPI:   Gia is a 68 y.o. female who presents today for:    Graves disease/ palpitations: she is having weight loss, she is down 6 pounds from her previous visit. She never picked up methimazole when it was prescribed. She feels like her heart rate is racing all the time. She has previously been on PTU, not currently taking. She is excessively hungry, rapid speech.  She has not established with endocrinology, and is requesting a new referral today.    Hypertension: Patient blood pressure is elevated today, 156/92.  She states that she did take her hydrochlorothiazide this morning.  She denies chest pain, or pressure.    Diabetes: has not been checking her sugar. Last A1C came down to 7.1% last month. She was taking her     Allergies: Patient has no known allergies.     Medications:   Current Outpatient Medications:     methimazole (TAPAZOLE) 10 MG Tab, Take 1 tablet by mouth 2 times a day., Disp: 180 Tablet, Rfl: 3    atenolol (TENORMIN) 25 MG Tab, Take 1 tablet by mouth every day., Disp: 90 Tablet, Rfl: 3    insulin glargine (LANTUS SOLOSTAR) 100 UNIT/ML Solution Pen-injector injection, Inject 10 Units under the skin every evening., Disp: 9 mL, Rfl: 0    polyethylene glycol-electrolytes (GOLYTELY) 236 GM Recon Soln, Use as instructed by office., Disp: 4000 mL, Rfl: 0    Misc. Devices Misc, One glucerna daily, Disp: 90 Each, Rfl: 3    Blood Glucose Monitoring Suppl (TRUE METRIX METER) w/Device Kit, Test blood sugar as recommended by provider.  Dx E 11.65, Disp: 1 Kit, Rfl: PRN    glucose blood (TRUE METRIX BLOOD GLUCOSE TEST) strip, For test strips: Use 3x/day and as needed for signs and symptoms of high or low blood sugar. Dx E 11.65, Disp: 300 Strip, Rfl: 11    Lancets, Use 3 times daily and as needed for signs and symptoms of high or low blood sugar. Dx E 11.65, Disp: 100 Each, Rfl: 3    hydrochlorothiazide (MICROZIDE) 12.5 MG capsule, Take 1  "Capsule by mouth every day., Disp: 90 Capsule, Rfl: 3    simvastatin (ZOCOR) 20 MG Tab, Take 1 Tablet by mouth every evening., Disp: 90 Tablet, Rfl: 1    albuterol (PROVENTIL HFA) 108 (90 Base) MCG/ACT Aero Soln inhalation aerosol, Inhale 2 Puffs every four hours as needed for Shortness of Breath., Disp: 2 g, Rfl: 3    nitroglycerin (NITROSTAT) 0.4 MG SL Tab, Place 1 Tab under tongue as needed for Chest Pain., Disp: 25 Tab, Rfl: 1      ROS:  Review of Systems   Constitutional:  Negative for chills and fever.   Respiratory:  Negative for cough and shortness of breath.    Cardiovascular:  Negative for chest pain, palpitations, orthopnea and leg swelling.       Objective:     Exam:  BP (!) 156/92   Pulse 91   Temp 36.6 °C (97.9 °F) (Temporal)   Ht 1.702 m (5' 7\")   Wt 54.5 kg (120 lb 3.2 oz)   SpO2 96%   BMI 18.83 kg/m²  Body mass index is 18.83 kg/m².    Physical Exam  Constitutional:       Appearance: Normal appearance.   Eyes:      Pupils: Pupils are equal, round, and reactive to light.   Cardiovascular:      Rate and Rhythm: Normal rate and regular rhythm.      Pulses: Normal pulses.      Heart sounds: Normal heart sounds.   Pulmonary:      Effort: Pulmonary effort is normal.      Breath sounds: Normal breath sounds.   Abdominal:      General: Bowel sounds are normal.      Palpations: Abdomen is soft.   Neurological:      Mental Status: She is alert and oriented to person, place, and time.   Psychiatric:         Mood and Affect: Mood normal.         Behavior: Behavior normal.           Assessment & Plan:     Gia blevins 68 y.o. female with the following -     1. Graves' disease  2. Palpitations  Chronic, unstable  Instructed patient to begin taking methimaole - Referral to Endocrinology  - methimazole (TAPAZOLE) 10 MG Tab; Take 1 tablet by mouth 2 times a day.  Dispense: 180 Tablet; Refill: 3  - TSH; Future  - FREE THYROXINE; Future  - TRIIDOTHYRONINE; Future  - THYROID PEROXIDASE  (TPO) AB; Future  - atenolol " (TENORMIN) 25 MG Tab; Take 1 tablet by mouth every day.  Dispense: 90 Tablet; Refill: 3    3. Essential hypertension  Chronic, unstable  BP elevated today. Instructed her to continue HCTZ. Will add atenolol for grave's disease/ palpations. This will help with BP as well. May need to increase HCTZ at next apt if BP remains elevated. Follow up in 3 weeks.  - atenolol (TENORMIN) 25 MG Tab; Take 1 tablet by mouth every day.  Dispense: 90 Tablet; Refill: 3    4. Encounter for screening mammogram for malignant neoplasm of breast  - MA-SCREENING MAMMO BILAT W/TOMOSYNTHESIS W/CAD; Future        Anticipatory guidance included the following: Patient counseled about skin care, diet, supplements, smoking, drugs/alcohol use, safe sex and exercise.     Return in about 2 weeks (around 10/26/2023).    Please note that this dictation was created using voice recognition software. I have made every reasonable attempt to correct obvious errors, but I expect that there are errors of grammar and possibly content that I did not discover before finalizing the note.

## 2023-10-16 ENCOUNTER — PHARMACY VISIT (OUTPATIENT)
Dept: PHARMACY | Facility: MEDICAL CENTER | Age: 68
End: 2023-10-16
Payer: MEDICARE

## 2023-10-17 ENCOUNTER — DOCUMENTATION (OUTPATIENT)
Dept: HEALTH INFORMATION MANAGEMENT | Facility: OTHER | Age: 68
End: 2023-10-17
Payer: MEDICARE

## 2023-10-18 ENCOUNTER — APPOINTMENT (OUTPATIENT)
Dept: ADMISSIONS | Facility: MEDICAL CENTER | Age: 68
End: 2023-10-18
Attending: INTERNAL MEDICINE
Payer: MEDICARE

## 2023-10-18 ENCOUNTER — HOSPITAL ENCOUNTER (OUTPATIENT)
Dept: RADIOLOGY | Facility: MEDICAL CENTER | Age: 68
End: 2023-10-18
Payer: MEDICARE

## 2023-10-18 DIAGNOSIS — Z12.31 ENCOUNTER FOR SCREENING MAMMOGRAM FOR MALIGNANT NEOPLASM OF BREAST: ICD-10-CM

## 2023-10-18 PROCEDURE — 77063 BREAST TOMOSYNTHESIS BI: CPT

## 2023-10-31 ENCOUNTER — APPOINTMENT (OUTPATIENT)
Dept: MEDICAL GROUP | Facility: MEDICAL CENTER | Age: 68
End: 2023-10-31
Payer: MEDICARE

## 2023-11-01 ENCOUNTER — APPOINTMENT (OUTPATIENT)
Dept: ADMISSIONS | Facility: MEDICAL CENTER | Age: 68
End: 2023-11-01
Attending: INTERNAL MEDICINE
Payer: MEDICARE

## 2023-11-09 ENCOUNTER — PRE-ADMISSION TESTING (OUTPATIENT)
Dept: ADMISSIONS | Facility: MEDICAL CENTER | Age: 68
End: 2023-11-09
Attending: INTERNAL MEDICINE
Payer: MEDICARE

## 2023-11-09 VITALS — BODY MASS INDEX: 18.83 KG/M2 | HEIGHT: 67 IN

## 2023-11-09 DIAGNOSIS — Z01.812 PRE-OPERATIVE LABORATORY EXAMINATION: ICD-10-CM

## 2023-11-09 NOTE — PREPROCEDURE INSTRUCTIONS
Pre-admit telephone appointment completed. Reviewed the Preparing for your procedure handout with patient over the phone. Patient instructed per pharmacy guidelines regarding taking, holding, or contacting provider for instructions on regularly prescribed medications before surgery. Instructed to take the following medications the day of surgery with a sip of water per pharmacy medication guidelines: Atenolol, Methimazole    Denies anesthesia complications  Pt states she has prep and instructions for colonoscopy

## 2023-11-14 ENCOUNTER — PRE-ADMISSION TESTING (OUTPATIENT)
Dept: ADMISSIONS | Facility: MEDICAL CENTER | Age: 68
End: 2023-11-14
Attending: INTERNAL MEDICINE
Payer: MEDICARE

## 2023-11-14 ENCOUNTER — HOSPITAL ENCOUNTER (OUTPATIENT)
Dept: LAB | Facility: MEDICAL CENTER | Age: 68
End: 2023-11-14
Attending: INTERNAL MEDICINE
Payer: MEDICARE

## 2023-11-14 DIAGNOSIS — E05.00 GRAVES' DISEASE: ICD-10-CM

## 2023-11-14 DIAGNOSIS — Z01.812 PRE-OPERATIVE LABORATORY EXAMINATION: ICD-10-CM

## 2023-11-14 LAB
ANION GAP SERPL CALC-SCNC: 14 MMOL/L (ref 7–16)
BUN SERPL-MCNC: 9 MG/DL (ref 8–22)
CALCIUM SERPL-MCNC: 8.9 MG/DL (ref 8.4–10.2)
CHLORIDE SERPL-SCNC: 103 MMOL/L (ref 96–112)
CO2 SERPL-SCNC: 23 MMOL/L (ref 20–33)
CREAT SERPL-MCNC: 0.68 MG/DL (ref 0.5–1.4)
EKG IMPRESSION: NORMAL
GFR SERPLBLD CREATININE-BSD FMLA CKD-EPI: 96 ML/MIN/1.73 M 2
GLUCOSE SERPL-MCNC: 148 MG/DL (ref 65–99)
POTASSIUM SERPL-SCNC: 3.7 MMOL/L (ref 3.6–5.5)
SODIUM SERPL-SCNC: 140 MMOL/L (ref 135–145)
T3 SERPL-MCNC: 145 NG/DL (ref 60–181)
T4 FREE SERPL-MCNC: 1.48 NG/DL (ref 0.93–1.7)
THYROPEROXIDASE AB SERPL-ACNC: 230 IU/ML (ref 0–9)
TSH SERPL DL<=0.005 MIU/L-ACNC: <0.005 UIU/ML (ref 0.38–5.33)

## 2023-11-14 PROCEDURE — 93010 ELECTROCARDIOGRAM REPORT: CPT | Performed by: INTERNAL MEDICINE

## 2023-11-14 PROCEDURE — 86376 MICROSOMAL ANTIBODY EACH: CPT

## 2023-11-14 PROCEDURE — 84439 ASSAY OF FREE THYROXINE: CPT

## 2023-11-14 PROCEDURE — 84480 ASSAY TRIIODOTHYRONINE (T3): CPT

## 2023-11-14 PROCEDURE — 36415 COLL VENOUS BLD VENIPUNCTURE: CPT

## 2023-11-14 PROCEDURE — 80048 BASIC METABOLIC PNL TOTAL CA: CPT

## 2023-11-14 PROCEDURE — 93005 ELECTROCARDIOGRAM TRACING: CPT

## 2023-11-14 PROCEDURE — 84443 ASSAY THYROID STIM HORMONE: CPT

## 2023-11-20 ENCOUNTER — OFFICE VISIT (OUTPATIENT)
Dept: MEDICAL GROUP | Facility: MEDICAL CENTER | Age: 68
End: 2023-11-20
Payer: MEDICARE

## 2023-11-20 VITALS
WEIGHT: 124 LBS | BODY MASS INDEX: 19.46 KG/M2 | OXYGEN SATURATION: 97 % | HEART RATE: 66 BPM | TEMPERATURE: 97.3 F | SYSTOLIC BLOOD PRESSURE: 138 MMHG | HEIGHT: 67 IN | DIASTOLIC BLOOD PRESSURE: 74 MMHG

## 2023-11-20 DIAGNOSIS — I25.2 HISTORY OF MI (MYOCARDIAL INFARCTION): ICD-10-CM

## 2023-11-20 DIAGNOSIS — Z01.818 PREOPERATIVE CLEARANCE: ICD-10-CM

## 2023-11-20 DIAGNOSIS — E05.00 GRAVES' DISEASE: ICD-10-CM

## 2023-11-20 DIAGNOSIS — R94.31 ABNORMAL EKG: ICD-10-CM

## 2023-11-20 DIAGNOSIS — R56.9 SEIZURES (HCC): ICD-10-CM

## 2023-11-20 PROCEDURE — 99214 OFFICE O/P EST MOD 30 MIN: CPT

## 2023-11-20 PROCEDURE — 3075F SYST BP GE 130 - 139MM HG: CPT

## 2023-11-20 PROCEDURE — 3078F DIAST BP <80 MM HG: CPT

## 2023-11-20 ASSESSMENT — ENCOUNTER SYMPTOMS
FEVER: 0
SHORTNESS OF BREATH: 0
CHILLS: 0
ORTHOPNEA: 0
PALPITATIONS: 0
COUGH: 0

## 2023-11-20 ASSESSMENT — FIBROSIS 4 INDEX: FIB4 SCORE: 1.47

## 2023-11-20 NOTE — PROGRESS NOTES
Subjective:     CC: Follow-Up (Pt needs a cardiologist referral.)      HPI:   Gia is a 68 y.o. female who presents today for:    Graves: she was told to resume methimazole and atenolol, she has resumed it. She has gained 4 lbs. She has been feeling better. She has not been to endocrinology yet.     Preoperative clearance/ Hx of MI: patient was scheduled to have a colonoscopy for a positive cologuard. Her preoperative EKG showed sinus bradycardia with possible anteroseptal infarct and she was told she needs cardiac clearance before proceeding. Patient requesting referral to cardiology today. She denies any known history of MI.     Allergies: Patient has no known allergies.     Medications:   Current Outpatient Medications:     methimazole (TAPAZOLE) 10 MG Tab, Take 1 tablet by mouth 2 times a day., Disp: 180 Tablet, Rfl: 3    atenolol (TENORMIN) 25 MG Tab, Take 1 tablet by mouth every day., Disp: 90 Tablet, Rfl: 3    insulin glargine (LANTUS SOLOSTAR) 100 UNIT/ML Solution Pen-injector injection, Inject 10 Units under the skin every evening. (Patient taking differently: Inject 10 Units under the skin every morning.), Disp: 9 mL, Rfl: 0    polyethylene glycol-electrolytes (GOLYTELY) 236 GM Recon Soln, Use as instructed by office., Disp: 4000 mL, Rfl: 0    Misc. Devices Misc, One glucerna daily, Disp: 90 Each, Rfl: 3    Blood Glucose Monitoring Suppl (TRUE METRIX METER) w/Device Kit, Test blood sugar as recommended by provider.  Dx E 11.65, Disp: 1 Kit, Rfl: PRN    glucose blood (TRUE METRIX BLOOD GLUCOSE TEST) strip, For test strips: Use 3x/day and as needed for signs and symptoms of high or low blood sugar. Dx E 11.65, Disp: 300 Strip, Rfl: 11    Lancets, Use 3 times daily and as needed for signs and symptoms of high or low blood sugar. Dx E 11.65, Disp: 100 Each, Rfl: 3    hydrochlorothiazide (MICROZIDE) 12.5 MG capsule, Take 1 Capsule by mouth every day., Disp: 90 Capsule, Rfl: 3    simvastatin (ZOCOR) 20 MG  "Tab, Take 1 Tablet by mouth every evening., Disp: 90 Tablet, Rfl: 1    albuterol (PROVENTIL HFA) 108 (90 Base) MCG/ACT Aero Soln inhalation aerosol, Inhale 2 Puffs every four hours as needed for Shortness of Breath., Disp: 2 g, Rfl: 3    nitroglycerin (NITROSTAT) 0.4 MG SL Tab, Place 1 Tab under tongue as needed for Chest Pain., Disp: 25 Tab, Rfl: 1      ROS:  Review of Systems   Constitutional:  Negative for chills and fever.   Respiratory:  Negative for cough and shortness of breath.    Cardiovascular:  Negative for chest pain, palpitations, orthopnea and leg swelling.       Objective:     Exam:  /74   Pulse 66   Temp 36.3 °C (97.3 °F) (Temporal)   Ht 1.702 m (5' 7\")   Wt 56.2 kg (124 lb)   SpO2 97%   BMI 19.42 kg/m²  Body mass index is 19.42 kg/m².    Physical Exam  Constitutional:       Appearance: Normal appearance.   Eyes:      Pupils: Pupils are equal, round, and reactive to light.   Cardiovascular:      Rate and Rhythm: Normal rate and regular rhythm.      Pulses: Normal pulses.      Heart sounds: Normal heart sounds.   Pulmonary:      Effort: Pulmonary effort is normal.      Breath sounds: Normal breath sounds.   Abdominal:      General: Bowel sounds are normal.      Palpations: Abdomen is soft.   Neurological:      Mental Status: She is alert and oriented to person, place, and time.   Psychiatric:         Mood and Affect: Mood normal.         Behavior: Behavior normal.           Assessment & Plan:     Gia a 68 y.o. female with the following -     1. Graves' disease  Chronic, stable  She has been taking her medication. She is gaining weight. Less palpations. Repeat labs before next apt.   - TSH; Future  - FREE THYROXINE; Future  - TRIIDOTHYRONINE; Future  - continue methimazole (TAPAZOLE) 10 MG Tab, Take 1 tablet by mouth 2 times a day., Disp: 180 Tablet, Rfl: 3  - continue atenolol (TENORMIN) 25 MG Tab, Take 1 tablet by mouth every day., Disp: 90 Tablet, Rfl: 3    2. Abnormal EKG  3. " Preoperative clearance  4. History of MI (myocardial infarction)  Undiagnosed problem with uncertain prognosis  Recent EKG showed possible anteroseptal infarct. GI requesting cardiology clearance prior to colonoscopy.   - REFERRAL TO CARDIOLOGY    5. Seizures (HCC)  Chronic, stable.   Continue with current defined treatment plan: not on medication for seizures. No recent seizures. Follow-up at least annually.      Anticipatory guidance included the following: Patient counseled about skin care, diet, supplements, smoking, drugs/alcohol use, safe sex and exercise.     Return in about 3 months (around 2/20/2024).    Please note that this dictation was created using voice recognition software. I have made every reasonable attempt to correct obvious errors, but I expect that there are errors of grammar and possibly content that I did not discover before finalizing the note.

## 2023-12-01 ENCOUNTER — TELEPHONE (OUTPATIENT)
Dept: CARDIOLOGY | Facility: MEDICAL CENTER | Age: 68
End: 2023-12-01
Payer: MEDICARE

## 2023-12-01 NOTE — TELEPHONE ENCOUNTER
Called patient in regards to records for NP appointment with Dr. NICHOLS To review most recent lab results, ekg, any cardiac testing or ,if patient has been treated by a cardiologist. No answer, left voicemail to call back

## 2023-12-06 ENCOUNTER — OFFICE VISIT (OUTPATIENT)
Dept: CARDIOLOGY | Facility: MEDICAL CENTER | Age: 68
End: 2023-12-06
Payer: MEDICARE

## 2023-12-06 VITALS
OXYGEN SATURATION: 97 % | SYSTOLIC BLOOD PRESSURE: 150 MMHG | RESPIRATION RATE: 16 BRPM | WEIGHT: 123 LBS | HEART RATE: 68 BPM | HEIGHT: 67 IN | DIASTOLIC BLOOD PRESSURE: 80 MMHG | BODY MASS INDEX: 19.3 KG/M2

## 2023-12-06 DIAGNOSIS — I25.2 HISTORY OF MI (MYOCARDIAL INFARCTION): ICD-10-CM

## 2023-12-06 DIAGNOSIS — E05.00 GRAVES' DISEASE: ICD-10-CM

## 2023-12-06 DIAGNOSIS — R00.2 PALPITATIONS: ICD-10-CM

## 2023-12-06 DIAGNOSIS — E78.5 DYSLIPIDEMIA: ICD-10-CM

## 2023-12-06 DIAGNOSIS — Z01.818 PREOPERATIVE CLEARANCE: ICD-10-CM

## 2023-12-06 DIAGNOSIS — R94.31 ABNORMAL EKG: ICD-10-CM

## 2023-12-06 DIAGNOSIS — I10 ESSENTIAL HYPERTENSION: ICD-10-CM

## 2023-12-06 PROCEDURE — 3077F SYST BP >= 140 MM HG: CPT | Performed by: INTERNAL MEDICINE

## 2023-12-06 PROCEDURE — 3079F DIAST BP 80-89 MM HG: CPT | Performed by: INTERNAL MEDICINE

## 2023-12-06 PROCEDURE — 99204 OFFICE O/P NEW MOD 45 MIN: CPT | Performed by: INTERNAL MEDICINE

## 2023-12-06 PROCEDURE — 99213 OFFICE O/P EST LOW 20 MIN: CPT | Performed by: INTERNAL MEDICINE

## 2023-12-06 RX ORDER — ATENOLOL 25 MG/1
25 TABLET ORAL DAILY
Qty: 90 TABLET | Refills: 3 | Status: SHIPPED | OUTPATIENT
Start: 2023-12-06 | End: 2024-02-20

## 2023-12-06 RX ORDER — SIMVASTATIN 20 MG
20 TABLET ORAL EVERY EVENING
Qty: 90 TABLET | Refills: 3 | Status: SHIPPED | OUTPATIENT
Start: 2023-12-06 | End: 2024-02-12

## 2023-12-06 RX ORDER — LISINOPRIL AND HYDROCHLOROTHIAZIDE 12.5; 1 MG/1; MG/1
1 TABLET ORAL DAILY
Qty: 90 TABLET | Refills: 3 | Status: SHIPPED | OUTPATIENT
Start: 2023-12-06

## 2023-12-06 RX ORDER — LISINOPRIL AND HYDROCHLOROTHIAZIDE 12.5; 1 MG/1; MG/1
1 TABLET ORAL DAILY
Qty: 90 TABLET | Refills: 3 | Status: SHIPPED | OUTPATIENT
Start: 2023-12-06 | End: 2023-12-06 | Stop reason: SDUPTHER

## 2023-12-06 ASSESSMENT — ENCOUNTER SYMPTOMS
CHILLS: 0
FOCAL WEAKNESS: 0
DIZZINESS: 0
FEVER: 0
BRUISES/BLEEDS EASILY: 0
CLAUDICATION: 0
NAUSEA: 0
PALPITATIONS: 0
WEAKNESS: 0
SORE THROAT: 0
WEIGHT LOSS: 1
COUGH: 0
SHORTNESS OF BREATH: 0
BLURRED VISION: 1
ABDOMINAL PAIN: 0
PND: 0
FALLS: 0

## 2023-12-06 ASSESSMENT — FIBROSIS 4 INDEX: FIB4 SCORE: 1.47

## 2023-12-06 NOTE — LETTER
PROCEDURE/SURGERY CLEARANCE FORM      Encounter Date: 12/6/2023    Patient: Gia Farmer  YOB: 1955    CARDIOLOGIST:  William Ornelas M.D.    REFERRING DOCTOR:  Dipti Rob A., Dr López        The above patient is cleared to have the following procedure/surgery: colonoscopy                                           Additional comments: She can proceed with the proposed procedure or surgery from a cardiac standpoint, no modifiable cardiovascular risk, no further cardiac testing required.  She is scheduled for echocardiogram for thoroughness but this is not required for clearance.    It is my pleasure to participate in the care of Ms. Farmer.  Please do not hesitate to contact me with questions or concerns. [facility] is available 24/7 for consultative services at [phone] in the perioperative period.    Electronically Signed    William Ornelas MD PhD [facility]  Cardiologist [facility]    Please note that this dictation was created using voice recognition software. There may be errors I did not discover before finalizing the note.                    MD Signature   William Ornelas M.D.       I cannot release the above patient for the following procedure/surgery without a cardiology evaluation:                                MD Carey Ornelas M.D.

## 2023-12-07 NOTE — PROGRESS NOTES
Chief Complaint   Patient presents with    Hypertension    Dyslipidemia       Subjective     Gia Farmer is a 68 y.o. female who presents today  in consultation from ALON Garland for evaluation of abnormal stress test and preoperative risk assessment.  She has longstanding diabetes and is anticipating colonoscopy for adenoma but was seen to have possible infarct on automated read of her EKG looking back over her records this was always present going back to 2008, she had a stress test in 2009 and echocardiogram that were normal.  She denies any cardiac symptoms      Past Medical History:   Diagnosis Date    Anxiety     Arrhythmia     Chronic headache     Dental disorder     upper and lower dentures    Depression     Diabetes (HCC)     Grave's disease     High cholesterol     Hypertension     Meningitis 1950    Seizures (HCC)     No longer an issue.     Past Surgical History:   Procedure Laterality Date    ACHILLES TENDON REPAIR Right     TUBAL LIGATION       Family History   Problem Relation Age of Onset    Diabetes Mother     Heart Disease Mother         MI    Hypertension Mother     Stroke Father     Hypertension Father     Diabetes Sister     Colon Cancer Maternal Grandmother 80     Social History     Socioeconomic History    Marital status: Single     Spouse name: Not on file    Number of children: Not on file    Years of education: Not on file    Highest education level: Not on file   Occupational History    Not on file   Tobacco Use    Smoking status: Every Day     Current packs/day: 0.25     Average packs/day: 0.3 packs/day for 26.9 years (6.7 ttl pk-yrs)     Types: Cigarettes     Start date: 1997    Smokeless tobacco: Never    Tobacco comments:     started at 42/3 cigs/day   Vaping Use    Vaping Use: Never used   Substance and Sexual Activity    Alcohol use: Not Currently     Alcohol/week: 0.0 oz     Comment: occ    Drug use: No    Sexual activity: Never     Partners: Male   Other  Topics Concern    Not on file   Social History Narrative    Not on file     Social Determinants of Health     Financial Resource Strain: Not on file   Food Insecurity: Not on file   Transportation Needs: Not on file   Physical Activity: Not on file   Stress: Not on file   Social Connections: Not on file   Intimate Partner Violence: Not on file   Housing Stability: Not on file     No Known Allergies  Outpatient Encounter Medications as of 12/6/2023   Medication Sig Dispense Refill    lisinopril-hydrochlorothiazide (PRINZIDE) 10-12.5 MG per tablet Take 1 Tablet by mouth every day. 90 Tablet 3    simvastatin (ZOCOR) 20 MG Tab Take 1 Tablet by mouth every evening. 90 Tablet 3    atenolol (TENORMIN) 25 MG Tab Take 1 tablet by mouth every day. 90 Tablet 3    methimazole (TAPAZOLE) 10 MG Tab Take 1 tablet by mouth 2 times a day. 180 Tablet 3    insulin glargine (LANTUS SOLOSTAR) 100 UNIT/ML Solution Pen-injector injection Inject 10 Units under the skin every evening. (Patient taking differently: Inject 10 Units under the skin every morning.) 9 mL 0    polyethylene glycol-electrolytes (GOLYTELY) 236 GM Recon Soln Use as instructed by office. 4000 mL 0    Misc. Devices Misc One glucerna daily 90 Each 3    Blood Glucose Monitoring Suppl (TRUE METRIX METER) w/Device Kit Test blood sugar as recommended by provider.  Dx E 11.65 1 Kit PRN    glucose blood (TRUE METRIX BLOOD GLUCOSE TEST) strip For test strips: Use 3x/day and as needed for signs and symptoms of high or low blood sugar. Dx E 11.65 300 Strip 11    Lancets Use 3 times daily and as needed for signs and symptoms of high or low blood sugar. Dx E 11.65 100 Each 3    albuterol (PROVENTIL HFA) 108 (90 Base) MCG/ACT Aero Soln inhalation aerosol Inhale 2 Puffs every four hours as needed for Shortness of Breath. 2 g 3    [DISCONTINUED] lisinopril-hydrochlorothiazide (PRINZIDE) 10-12.5 MG per tablet Take 1 Tablet by mouth every day. 90 Tablet 3    [DISCONTINUED] atenolol  "(TENORMIN) 25 MG Tab Take 1 tablet by mouth every day. 90 Tablet 3    [DISCONTINUED] hydrochlorothiazide (MICROZIDE) 12.5 MG capsule Take 1 Capsule by mouth every day. 90 Capsule 3    [DISCONTINUED] simvastatin (ZOCOR) 20 MG Tab Take 1 Tablet by mouth every evening. 90 Tablet 1    [DISCONTINUED] nitroglycerin (NITROSTAT) 0.4 MG SL Tab Place 1 Tab under tongue as needed for Chest Pain. 25 Tab 1     No facility-administered encounter medications on file as of 12/6/2023.     Review of Systems   Constitutional:  Positive for weight loss. Negative for chills and fever.   HENT:  Negative for sore throat.    Eyes:  Positive for blurred vision.   Respiratory:  Negative for cough and shortness of breath.    Cardiovascular:  Positive for leg swelling. Negative for chest pain, palpitations, claudication and PND.   Gastrointestinal:  Negative for abdominal pain and nausea.   Musculoskeletal:  Negative for falls and joint pain.   Skin:  Negative for rash.   Neurological:  Negative for dizziness, focal weakness and weakness.   Endo/Heme/Allergies:  Positive for environmental allergies. Does not bruise/bleed easily.              Objective     BP (!) 150/80 (BP Location: Left arm, Patient Position: Sitting, BP Cuff Size: Adult)   Pulse 68   Resp 16   Ht 1.702 m (5' 7\")   Wt 55.8 kg (123 lb)   SpO2 97%   BMI 19.26 kg/m²     Physical Exam  Constitutional:       General: She is not in acute distress.     Appearance: She is not diaphoretic.   Eyes:      General: No scleral icterus.  Neck:      Vascular: No JVD.   Cardiovascular:      Rate and Rhythm: Normal rate.      Heart sounds: Normal heart sounds. No murmur heard.     No friction rub. No gallop.   Pulmonary:      Effort: No respiratory distress.      Breath sounds: No wheezing or rales.   Abdominal:      General: Bowel sounds are normal.      Palpations: Abdomen is soft.   Musculoskeletal:      Right lower leg: No edema.      Left lower leg: No edema.   Skin:     Findings: " No rash.   Neurological:      Mental Status: She is alert. Mental status is at baseline.   Psychiatric:         Mood and Affect: Mood normal.            We reviewed in person the most recent labs  Recent Results (from the past 5040 hour(s))   TSH    Collection Time: 05/19/23  7:41 AM   Result Value Ref Range    TSH <0.005 (L) 0.380 - 5.330 uIU/mL   Lipid Profile    Collection Time: 05/19/23  7:41 AM   Result Value Ref Range    Cholesterol,Tot 179 100 - 199 mg/dL    Triglycerides 50 0 - 149 mg/dL    HDL 67 >=40 mg/dL     (H) <100 mg/dL   Basic Metabolic Panel    Collection Time: 05/19/23  7:41 AM   Result Value Ref Range    Sodium 143 135 - 145 mmol/L    Potassium 4.0 3.6 - 5.5 mmol/L    Chloride 105 96 - 112 mmol/L    Co2 25 20 - 33 mmol/L    Glucose 126 (H) 65 - 99 mg/dL    Bun 15 8 - 22 mg/dL    Creatinine 0.83 0.50 - 1.40 mg/dL    Calcium 9.1 8.5 - 10.5 mg/dL    Anion Gap 13.0 7.0 - 16.0   CBC WITHOUT DIFFERENTIAL    Collection Time: 05/19/23  7:41 AM   Result Value Ref Range    WBC 4.7 (L) 4.8 - 10.8 K/uL    RBC 5.44 (H) 4.20 - 5.40 M/uL    Hemoglobin 17.2 (H) 12.0 - 16.0 g/dL    Hematocrit 50.8 (H) 37.0 - 47.0 %    MCV 93.4 81.4 - 97.8 fL    MCH 31.6 27.0 - 33.0 pg    MCHC 33.9 33.6 - 35.0 g/dL    RDW 42.9 35.9 - 50.0 fL    Platelet Count 234 164 - 446 K/uL    MPV 11.0 9.0 - 12.9 fL   FASTING STATUS    Collection Time: 05/19/23  7:41 AM   Result Value Ref Range    Fasting Status Fasting    ESTIMATED GFR    Collection Time: 05/19/23  7:41 AM   Result Value Ref Range    GFR (CKD-EPI) 78 >60 mL/min/1.73 m 2   MICROALBUMIN CREAT RATIO URINE    Collection Time: 06/22/23 12:00 PM   Result Value Ref Range    Creatinine, Urine 115.59 mg/dL    Microalbumin, Urine Random <1.2 mg/dL    Micro Alb Creat Ratio see below 0 - 30 mg/g   AMBIGUOUS DATE/TIME    Collection Time: 06/22/23  8:25 PM   Result Value Ref Range    Ambiguous Date/Time See Below:    POCT Hemoglobin A1C    Collection Time: 08/16/23  1:23 PM   Result  Value Ref Range    Glycohemoglobin 7.1 (A) 5.8 %    Internal Control Positive Positive     Internal Control Negative Negative    TSH    Collection Time: 23  7:53 AM   Result Value Ref Range    TSH <0.005 (L) 0.380 - 5.330 uIU/mL   FREE THYROXINE    Collection Time: 23  7:53 AM   Result Value Ref Range    Free T-4 1.48 0.93 - 1.70 ng/dL   TRIIDOTHYRONINE    Collection Time: 23  7:53 AM   Result Value Ref Range    T3 145.0 60.0 - 181.0 ng/dL   THYROID PEROXIDASE  (TPO) AB    Collection Time: 23  7:53 AM   Result Value Ref Range    Microsomal -Tpo- Abs 230.0 (H) 0.0 - 9.0 IU/mL   Basic Metabolic Panel    Collection Time: 23  9:37 AM   Result Value Ref Range    Sodium 140 135 - 145 mmol/L    Potassium 3.7 3.6 - 5.5 mmol/L    Chloride 103 96 - 112 mmol/L    Co2 23 20 - 33 mmol/L    Glucose 148 (H) 65 - 99 mg/dL    Bun 9 8 - 22 mg/dL    Creatinine 0.68 0.50 - 1.40 mg/dL    Calcium 8.9 8.4 - 10.2 mg/dL    Anion Gap 14.0 7.0 - 16.0   ESTIMATED GFR    Collection Time: 23  9:37 AM   Result Value Ref Range    GFR (CKD-EPI) 96 >60 mL/min/1.73 m 2   ECG    Collection Time: 23  9:43 AM   Result Value Ref Range    Report       [facility]    Test Date:  2023  Pt Name:    MONE KUHN               Department: [facility]  MRN:        [id number]                      Room:  Gender:     Female                       Technician:   :        [id number]                   Requested By:JOEL VALDEZ  Order #:    [id number]                    Reading MD: Octavia Pereira MD    Measurements  Intervals                                Axis  Rate:       61                           P:          82  DC:         139                          QRS:        23  QRSD:       80                           T:          50  QT:         460  QTc:        450    Interpretive Statements  Sinus bradycardia  Atrial premature complex  Probable left atrial enlargement  Anteroseptal infarct, age indeterminate  Compared  to ECG 08/05/2016 10:13:02  Atrial premature complex(es) now present  Myocardial infarct finding now present  Sinus rhythm no longer present  Ventricular premature complex(es) no longer present  Electronically Signed On 11 - 19:21:52 PST by Octavia Pereira MD           Assessment & Plan     1. Preoperative clearance        2. History of MI (myocardial infarction)        3. Abnormal EKG  EC-ECHOCARDIOGRAM COMPLETE W/O CONT      4. Graves' disease  atenolol (TENORMIN) 25 MG Tab      5. Palpitations  atenolol (TENORMIN) 25 MG Tab      6. Essential hypertension  lisinopril-hydrochlorothiazide (PRINZIDE) 10-12.5 MG per tablet    DISCONTINUED: lisinopril-hydrochlorothiazide (PRINZIDE) 10-12.5 MG per tablet      7. Dyslipidemia  simvastatin (ZOCOR) 20 MG Tab          Medical Decision Making: Today's Assessment/Status/Plan:        It was my pleasure to meet with Ms. Farmer.    We addressed the management of hypertension at today's visit. Blood pressure is well controlled.  We specifically assessed the labs on hypertension treatment  I switched her to lisinopril hydrochlorothiazide given diabetes    We addressed the management of dyslipidemia and atherosclerosis at today's visit. She is on appropriate statin.    For colonoscopy or other surgeries she is low risk and does not require further testing    We will obtain a nonurgent echocardiogram for update to her prior echocardiogram with abnormal EKG though I suspect this is only due to body habitus without any evidence of infarct.  Specifically the echocardiogram is not required for preoperative assessment but rather for general health assessment    We will follow up with Ms. Farmer on the results of [facility] or over the phone. We will determine further follow-up from there.    It is my pleasure to participate in the care of Ms. Farmer.  Please do not hesitate to contact me with questions or concerns.    William Ornelas MD PhD FAC  Cardiologist  [facility]    Please note that this dictation was created using voice recognition software. There may be errors I did not discover before finalizing the note.     12/6/2023  6:04 PM

## 2023-12-18 DIAGNOSIS — E11.9 TYPE 2 DIABETES MELLITUS WITHOUT COMPLICATION, WITHOUT LONG-TERM CURRENT USE OF INSULIN (HCC): ICD-10-CM

## 2023-12-18 PROCEDURE — RXMED WILLOW AMBULATORY MEDICATION CHARGE

## 2023-12-18 RX ORDER — INSULIN GLARGINE 100 [IU]/ML
10 INJECTION, SOLUTION SUBCUTANEOUS EVERY EVENING
Qty: 9 ML | Refills: 5 | Status: SHIPPED | OUTPATIENT
Start: 2023-12-18 | End: 2024-02-20 | Stop reason: SDUPTHER

## 2023-12-19 ENCOUNTER — PHARMACY VISIT (OUTPATIENT)
Dept: PHARMACY | Facility: MEDICAL CENTER | Age: 68
End: 2023-12-19
Payer: MEDICARE

## 2023-12-19 PROCEDURE — RXMED WILLOW AMBULATORY MEDICATION CHARGE

## 2023-12-28 ENCOUNTER — APPOINTMENT (OUTPATIENT)
Dept: ADMISSIONS | Facility: MEDICAL CENTER | Age: 68
End: 2023-12-28
Attending: INTERNAL MEDICINE
Payer: MEDICARE

## 2024-01-03 ENCOUNTER — APPOINTMENT (OUTPATIENT)
Dept: ADMISSIONS | Facility: MEDICAL CENTER | Age: 69
End: 2024-01-03
Attending: INTERNAL MEDICINE
Payer: MEDICARE

## 2024-01-08 ENCOUNTER — PRE-ADMISSION TESTING (OUTPATIENT)
Dept: ADMISSIONS | Facility: MEDICAL CENTER | Age: 69
End: 2024-01-08
Attending: INTERNAL MEDICINE
Payer: MEDICARE

## 2024-01-08 VITALS — HEIGHT: 67 IN | BODY MASS INDEX: 19.93 KG/M2 | WEIGHT: 127 LBS

## 2024-01-08 DIAGNOSIS — Z01.812 PRE-OPERATIVE LABORATORY EXAMINATION: ICD-10-CM

## 2024-01-08 RX ORDER — HYDROCHLOROTHIAZIDE 25 MG/1
12.5 TABLET ORAL PRN
COMMUNITY

## 2024-01-08 RX ORDER — PIOGLITAZONEHYDROCHLORIDE 30 MG/1
30 TABLET ORAL DAILY
COMMUNITY

## 2024-01-08 NOTE — PREPROCEDURE INSTRUCTIONS
PreAdmit Telephone Appointment completed with pt, including pt HX and medication review.  Reviewed the Preparing for your procedure handout with patient over the phone. Patient instructed per pharmacy guidelines regarding taking or holding regularly prescribed medications before surgery. Instructed to take the following medications the day of surgery with a sip of water per pharmacy medication guidelines: Atenolol, methimazole.Pt verbalizes understanding of all medication and preadmit instruction.     Pt instructed to report any flu/cold sx to surgeon. METS >4.  Pt insists to get labs done 1/15 at [facility] lab closest to her on [address] due to transportation.

## 2024-01-23 ENCOUNTER — APPOINTMENT (OUTPATIENT)
Dept: ADMISSIONS | Facility: MEDICAL CENTER | Age: 69
End: 2024-01-23
Attending: INTERNAL MEDICINE
Payer: MEDICARE

## 2024-01-26 ENCOUNTER — PRE-ADMISSION TESTING (OUTPATIENT)
Dept: ADMISSIONS | Facility: MEDICAL CENTER | Age: 69
End: 2024-01-26
Attending: INTERNAL MEDICINE
Payer: MEDICARE

## 2024-01-26 VITALS — HEIGHT: 67 IN | BODY MASS INDEX: 19.89 KG/M2

## 2024-01-26 DIAGNOSIS — Z01.812 PRE-OPERATIVE LABORATORY EXAMINATION: ICD-10-CM

## 2024-01-26 NOTE — PREPROCEDURE INSTRUCTIONS
Pt preadmitted via phone, instructions emailed. Questions answered.Patient instructed per pharmacy guidelines regarding taking, holding or contacting provider for instructions on regularly prescribed medications before surgery. Instructed to take the following medications the day of surgery with a sip of water per pharmacy medication guidelines- atenolol, albuterol inh if needed and tapazole. Also aware of taking insulin per anesthesia protocol. METs score >4.

## 2024-01-31 ENCOUNTER — TELEPHONE (OUTPATIENT)
Dept: HEALTH INFORMATION MANAGEMENT | Facility: OTHER | Age: 69
End: 2024-01-31

## 2024-02-12 DIAGNOSIS — E78.5 DYSLIPIDEMIA: ICD-10-CM

## 2024-02-12 PROCEDURE — RXMED WILLOW AMBULATORY MEDICATION CHARGE

## 2024-02-12 RX ORDER — SIMVASTATIN 20 MG
20 TABLET ORAL EVERY EVENING
Qty: 90 TABLET | Refills: 3 | Status: SHIPPED | OUTPATIENT
Start: 2024-02-12

## 2024-02-12 NOTE — TELEPHONE ENCOUNTER
Received request via: Pharmacy    Was the patient seen in the last year in this department? Yes    Does the patient have an active prescription (recently filled or refills available) for medication(s) requested? No    Pharmacy Name: [facility]    Does the patient have retirement Plus and need 100 day supply (blood pressure, diabetes and cholesterol meds only)? Yes, quantity updated to 100 days

## 2024-02-14 PROCEDURE — RXMED WILLOW AMBULATORY MEDICATION CHARGE

## 2024-02-16 ENCOUNTER — PHARMACY VISIT (OUTPATIENT)
Dept: PHARMACY | Facility: MEDICAL CENTER | Age: 69
End: 2024-02-16
Payer: COMMERCIAL

## 2024-02-16 PROCEDURE — RXMED WILLOW AMBULATORY MEDICATION CHARGE

## 2024-02-20 ENCOUNTER — OFFICE VISIT (OUTPATIENT)
Dept: MEDICAL GROUP | Facility: MEDICAL CENTER | Age: 69
End: 2024-02-20
Payer: MEDICARE

## 2024-02-20 ENCOUNTER — HOSPITAL ENCOUNTER (OUTPATIENT)
Dept: LAB | Facility: MEDICAL CENTER | Age: 69
End: 2024-02-20
Payer: MEDICARE

## 2024-02-20 VITALS
OXYGEN SATURATION: 97 % | TEMPERATURE: 97.1 F | WEIGHT: 122.6 LBS | HEART RATE: 73 BPM | SYSTOLIC BLOOD PRESSURE: 142 MMHG | HEIGHT: 67 IN | DIASTOLIC BLOOD PRESSURE: 86 MMHG | BODY MASS INDEX: 19.24 KG/M2

## 2024-02-20 DIAGNOSIS — E05.00 GRAVES' DISEASE: ICD-10-CM

## 2024-02-20 DIAGNOSIS — I10 ESSENTIAL HYPERTENSION: ICD-10-CM

## 2024-02-20 DIAGNOSIS — F41.9 ANXIETY: ICD-10-CM

## 2024-02-20 DIAGNOSIS — E11.9 TYPE 2 DIABETES MELLITUS WITHOUT COMPLICATION, WITHOUT LONG-TERM CURRENT USE OF INSULIN (HCC): ICD-10-CM

## 2024-02-20 LAB
HBA1C MFR BLD: 8.8 % (ref ?–5.8)
POCT INT CON NEG: NEGATIVE
POCT INT CON POS: POSITIVE
T3 SERPL-MCNC: 133 NG/DL (ref 60–181)
T4 FREE SERPL-MCNC: 1.31 NG/DL (ref 0.93–1.7)
TSH SERPL DL<=0.005 MIU/L-ACNC: <0.005 UIU/ML (ref 0.38–5.33)

## 2024-02-20 PROCEDURE — 83036 HEMOGLOBIN GLYCOSYLATED A1C: CPT

## 2024-02-20 PROCEDURE — 84443 ASSAY THYROID STIM HORMONE: CPT

## 2024-02-20 PROCEDURE — 3077F SYST BP >= 140 MM HG: CPT

## 2024-02-20 PROCEDURE — 84439 ASSAY OF FREE THYROXINE: CPT

## 2024-02-20 PROCEDURE — 36415 COLL VENOUS BLD VENIPUNCTURE: CPT

## 2024-02-20 PROCEDURE — 84480 ASSAY TRIIODOTHYRONINE (T3): CPT

## 2024-02-20 PROCEDURE — 99214 OFFICE O/P EST MOD 30 MIN: CPT

## 2024-02-20 PROCEDURE — RXMED WILLOW AMBULATORY MEDICATION CHARGE

## 2024-02-20 PROCEDURE — 3079F DIAST BP 80-89 MM HG: CPT

## 2024-02-20 RX ORDER — INSULIN GLARGINE 100 [IU]/ML
INJECTION, SOLUTION SUBCUTANEOUS
Qty: 15 ML | Refills: 5 | Status: SHIPPED | OUTPATIENT
Start: 2024-02-20

## 2024-02-20 ASSESSMENT — ENCOUNTER SYMPTOMS
ORTHOPNEA: 0
SHORTNESS OF BREATH: 0
PALPITATIONS: 0
CHILLS: 0
FEVER: 0
COUGH: 0

## 2024-02-20 ASSESSMENT — PATIENT HEALTH QUESTIONNAIRE - PHQ9: CLINICAL INTERPRETATION OF PHQ2 SCORE: 0

## 2024-02-20 NOTE — LETTER
February 20, 2024    To Whom It May Concern:         This is confirmation that Gia Farmer attended her scheduled appointment with ALON Garland on 2/20/24. Please allow her to have her dog Pa, on the bus with her. He is her emotional support animal who helps her with her anxiety.          If you have any questions please do not hesitate to call me at the phone number listed below.    Sincerely,          MIO Garland.  [phone]

## 2024-02-20 NOTE — PROGRESS NOTES
Subjective:     CC: Follow-Up      HPI:   Gia is a 68 y.o. female who presents today for:    Diabetes: Patient's A1c today is 8.8.  She is taking her Lantus and her pioglitazone    Graves disease: she has not called Endo yet.  She is taking her methimazole.  Weight has been stable.  No palpitations today.    Anxiety: requesting an STEVEN letter for her dog. She is planning to take a trip to [place] to visit her family. Her dog give her support when she is anxious. Her dog's name is .     Hypertension: Patient only took her atenolol this morning.  She has not been taking her lisinopril - hydrochlorothiazide.    Allergies: Patient has no known allergies.     Medications:   Current Outpatient Medications:     insulin glargine (LANTUS SOLOSTAR) 100 UNIT/ML Solution Pen-injector injection, If morning fasting blood sugar is over 160 inject 14 units subcutaneously once daily.  If fasting blood sugar is between 120-159 inject 12 unit subcutaneously once daily. If fasting blood sugar is below 120 inject 10 units subcutaneously once daily., Disp: 15 mL, Rfl: 5    simvastatin (ZOCOR) 20 MG Tab, Take 1 Tablet by mouth every evening., Disp: 90 Tablet, Rfl: 3    polyethylene glycol-electrolytes (NULYTELY) 420 GM solution, Use as instructed by office, Disp: 4000 mL, Rfl: 0    hydroCHLOROthiazide 25 MG Tab, Take 12.5 mg by mouth as needed (increased BP)., Disp: , Rfl:     pioglitazone (ACTOS) 30 MG Tab, Take 30 mg by mouth every day. Takes PRN now, Disp: , Rfl:     GOLYTELY 236 g Recon Soln, Use as instructed by office, Disp: 4000 mL, Rfl: 0    lisinopril-hydrochlorothiazide (PRINZIDE) 10-12.5 MG per tablet, Take 1 Tablet by mouth every day. (Patient taking differently: Take 1 Tablet by mouth every day. Takes PRN), Disp: 90 Tablet, Rfl: 3    methimazole (TAPAZOLE) 10 MG Tab, Take 1 tablet by mouth 2 times a day., Disp: 180 Tablet, Rfl: 3    atenolol (TENORMIN) 25 MG Tab, Take 1 tablet by mouth every day., Disp: 90 Tablet, Rfl: 3     "polyethylene glycol-electrolytes (GOLYTELY) 236 GM Recon Soln, Use as instructed by office., Disp: 4000 mL, Rfl: 0    Misc. Devices Misc, One glucerna daily, Disp: 90 Each, Rfl: 3    Blood Glucose Monitoring Suppl (TRUE METRIX METER) w/Device Kit, Test blood sugar as recommended by provider.  Dx E 11.65, Disp: 1 Kit, Rfl: PRN    glucose blood (TRUE METRIX BLOOD GLUCOSE TEST) strip, For test strips: Use 3x/day and as needed for signs and symptoms of high or low blood sugar. Dx E 11.65, Disp: 300 Strip, Rfl: 11    Lancets, Use 3 times daily and as needed for signs and symptoms of high or low blood sugar. Dx E 11.65, Disp: 100 Each, Rfl: 3    albuterol (PROVENTIL HFA) 108 (90 Base) MCG/ACT Aero Soln inhalation aerosol, Inhale 2 Puffs every four hours as needed for Shortness of Breath., Disp: 2 g, Rfl: 3      ROS:  Review of Systems   Constitutional:  Negative for chills and fever.   Respiratory:  Negative for cough and shortness of breath.    Cardiovascular:  Negative for chest pain, palpitations, orthopnea and leg swelling.       Objective:     Exam:  BP (!) 142/86   Pulse 73   Temp 36.2 °C (97.1 °F) (Temporal)   Ht 1.702 m (5' 7\")   Wt 55.6 kg (122 lb 9.6 oz)   SpO2 97%   BMI 19.20 kg/m²  Body mass index is 19.2 kg/m².    Physical Exam  Constitutional:       Appearance: Normal appearance.   Eyes:      Pupils: Pupils are equal, round, and reactive to light.   Cardiovascular:      Rate and Rhythm: Normal rate and regular rhythm.      Pulses: Normal pulses.      Heart sounds: Normal heart sounds.   Pulmonary:      Effort: Pulmonary effort is normal.      Breath sounds: Normal breath sounds.   Abdominal:      General: Bowel sounds are normal.      Palpations: Abdomen is soft.   Neurological:      Mental Status: She is alert and oriented to person, place, and time.   Psychiatric:         Mood and Affect: Mood normal.         Behavior: Behavior normal.           Assessment & Plan:     Gia blevins 68 y.o. female with " the following -     1. Type 2 diabetes mellitus without complication, without long-term current use of insulin (HCC)  Chronic, unstable  Continue to work on diet and exercise to help with blood sugar control.  Will adjust insulin today.  - POCT  A1C  - insulin glargine (LANTUS SOLOSTAR) 100 UNIT/ML Solution Pen-injector injection; If morning fasting blood sugar is over 160 inject 14 units subcutaneously once daily.  If fasting blood sugar is between 120-159 inject 12 unit subcutaneously once daily. If fasting blood sugar is below 120 inject 10 units subcutaneously once daily.  Dispense: 15 mL; Refill: 5  -Continue pioglitazone (ACTOS) 30 MG Tab, Take 30 mg by mouth every day. Takes PRN now, Disp: , Rfl:     2. Graves' disease  Chronic, stable  -Continue methimazole (TAPAZOLE) 10 MG Tab, Take 1 tablet by mouth 2 times a day., Disp: 180 Tablet, Rfl: 3  - follow up with endocrinology    3. Anxiety  Chronic, stable  STEVEN letter provided to patient. She feels like her symptoms are well controlled as long as she has her dog.      4. Essential hypertension  Chronic, stable  -Continue hydroCHLOROthiazide 25 MG Tab, Take 12.5 mg by mouth as needed (increased BP)., Disp: , Rfl:   -Continue  lisinopril-hydrochlorothiazide (PRINZIDE) 10-12.5 MG per tablet, Take 1 Tablet by mouth every day. (Patient taking differently: Take 1 Tablet by mouth every day. Takes PRN), Disp: 90 Tablet, Rfl: 3  -Continue atenolol (TENORMIN) 25 MG Tab, Take 1 tablet by mouth every day., Disp: 90 Tablet, Rfl: 3    HCC Gap Form    Diagnosis: R56.9 - Seizures (HCC)  Assessment and plan: Chronic, stable. Continue with current defined treatment plan: no seizures in several years. Follow-up at least annually.  Last edited 02/20/24 13:07 PST by DAVID [email or web address].         Anticipatory guidance included the following: Patient counseled about skin care, diet, supplements, smoking, drugs/alcohol use, safe sex and exercise.     Return in about 3 months  (around 5/20/2024).    Please note that this dictation was created using voice recognition software. I have made every reasonable attempt to correct obvious errors, but I expect that there are errors of grammar and possibly content that I did not discover before finalizing the note.      I have placed the below orders and discussed them with an approved delegating provider.  The MA is performing the below orders under the direction of Dr. Lee.

## 2024-02-21 DIAGNOSIS — E05.00 GRAVES' DISEASE: ICD-10-CM

## 2024-02-22 ENCOUNTER — HOSPITAL ENCOUNTER (OUTPATIENT)
Dept: LAB | Facility: MEDICAL CENTER | Age: 69
End: 2024-02-22
Attending: INTERNAL MEDICINE
Payer: MEDICARE

## 2024-02-22 DIAGNOSIS — Z01.812 PRE-OPERATIVE LABORATORY EXAMINATION: ICD-10-CM

## 2024-02-22 LAB
ANION GAP SERPL CALC-SCNC: 10 MMOL/L (ref 7–16)
BUN SERPL-MCNC: 11 MG/DL (ref 8–22)
CALCIUM SERPL-MCNC: 8.7 MG/DL (ref 8.5–10.5)
CHLORIDE SERPL-SCNC: 100 MMOL/L (ref 96–112)
CO2 SERPL-SCNC: 26 MMOL/L (ref 20–33)
CREAT SERPL-MCNC: 0.87 MG/DL (ref 0.5–1.4)
GFR SERPLBLD CREATININE-BSD FMLA CKD-EPI: 73 ML/MIN/1.73 M 2
GLUCOSE SERPL-MCNC: 222 MG/DL (ref 65–99)
POTASSIUM SERPL-SCNC: 3.9 MMOL/L (ref 3.6–5.5)
SODIUM SERPL-SCNC: 136 MMOL/L (ref 135–145)

## 2024-02-22 PROCEDURE — 36415 COLL VENOUS BLD VENIPUNCTURE: CPT

## 2024-02-22 PROCEDURE — 80048 BASIC METABOLIC PNL TOTAL CA: CPT

## 2024-02-29 ENCOUNTER — ANESTHESIA EVENT (OUTPATIENT)
Dept: SURGERY | Facility: MEDICAL CENTER | Age: 69
End: 2024-02-29
Payer: MEDICARE

## 2024-02-29 ENCOUNTER — ANESTHESIA (OUTPATIENT)
Dept: SURGERY | Facility: MEDICAL CENTER | Age: 69
End: 2024-02-29
Payer: MEDICARE

## 2024-02-29 ENCOUNTER — HOSPITAL ENCOUNTER (OUTPATIENT)
Facility: MEDICAL CENTER | Age: 69
End: 2024-02-29
Attending: INTERNAL MEDICINE | Admitting: INTERNAL MEDICINE
Payer: MEDICARE

## 2024-02-29 VITALS
HEIGHT: 67 IN | WEIGHT: 121.69 LBS | TEMPERATURE: 97 F | RESPIRATION RATE: 16 BRPM | DIASTOLIC BLOOD PRESSURE: 72 MMHG | HEART RATE: 60 BPM | OXYGEN SATURATION: 94 % | SYSTOLIC BLOOD PRESSURE: 148 MMHG | BODY MASS INDEX: 19.1 KG/M2

## 2024-02-29 LAB
GLUCOSE BLD STRIP.AUTO-MCNC: 100 MG/DL (ref 65–99)
PATHOLOGY CONSULT NOTE: NORMAL

## 2024-02-29 PROCEDURE — 160025 RECOVERY II MINUTES (STATS): Performed by: INTERNAL MEDICINE

## 2024-02-29 PROCEDURE — 160002 HCHG RECOVERY MINUTES (STAT): Performed by: INTERNAL MEDICINE

## 2024-02-29 PROCEDURE — 160203 HCHG ENDO MINUTES - 1ST 30 MINS LEVEL 4: Performed by: INTERNAL MEDICINE

## 2024-02-29 PROCEDURE — 82962 GLUCOSE BLOOD TEST: CPT

## 2024-02-29 PROCEDURE — 88341 IMHCHEM/IMCYTCHM EA ADD ANTB: CPT | Mod: 91

## 2024-02-29 PROCEDURE — 700111 HCHG RX REV CODE 636 W/ 250 OVERRIDE (IP): Performed by: ANESTHESIOLOGY

## 2024-02-29 PROCEDURE — 160208 HCHG ENDO MINUTES - EA ADDL 1 MIN LEVEL 4: Performed by: INTERNAL MEDICINE

## 2024-02-29 PROCEDURE — 160009 HCHG ANES TIME/MIN: Performed by: INTERNAL MEDICINE

## 2024-02-29 PROCEDURE — 88342 IMHCHEM/IMCYTCHM 1ST ANTB: CPT

## 2024-02-29 PROCEDURE — 160035 HCHG PACU - 1ST 60 MINS PHASE I: Performed by: INTERNAL MEDICINE

## 2024-02-29 PROCEDURE — 160048 HCHG OR STATISTICAL LEVEL 1-5: Performed by: INTERNAL MEDICINE

## 2024-02-29 PROCEDURE — 88305 TISSUE EXAM BY PATHOLOGIST: CPT | Mod: 59

## 2024-02-29 PROCEDURE — 160046 HCHG PACU - 1ST 60 MINS PHASE II: Performed by: INTERNAL MEDICINE

## 2024-02-29 PROCEDURE — 700105 HCHG RX REV CODE 258: Performed by: ANESTHESIOLOGY

## 2024-02-29 PROCEDURE — 700101 HCHG RX REV CODE 250: Performed by: ANESTHESIOLOGY

## 2024-02-29 PROCEDURE — C1889 IMPLANT/INSERT DEVICE, NOC: HCPCS | Performed by: INTERNAL MEDICINE

## 2024-02-29 RX ORDER — HYDROMORPHONE HYDROCHLORIDE 1 MG/ML
0.4 INJECTION, SOLUTION INTRAMUSCULAR; INTRAVENOUS; SUBCUTANEOUS
Status: DISCONTINUED | OUTPATIENT
Start: 2024-02-29 | End: 2024-02-29 | Stop reason: HOSPADM

## 2024-02-29 RX ORDER — HALOPERIDOL 5 MG/ML
1 INJECTION INTRAMUSCULAR
Status: DISCONTINUED | OUTPATIENT
Start: 2024-02-29 | End: 2024-02-29 | Stop reason: HOSPADM

## 2024-02-29 RX ORDER — HYDROMORPHONE HYDROCHLORIDE 1 MG/ML
0.1 INJECTION, SOLUTION INTRAMUSCULAR; INTRAVENOUS; SUBCUTANEOUS
Status: DISCONTINUED | OUTPATIENT
Start: 2024-02-29 | End: 2024-02-29 | Stop reason: HOSPADM

## 2024-02-29 RX ORDER — HYDRALAZINE HYDROCHLORIDE 20 MG/ML
INJECTION INTRAMUSCULAR; INTRAVENOUS PRN
Status: DISCONTINUED | OUTPATIENT
Start: 2024-02-29 | End: 2024-02-29 | Stop reason: SURG

## 2024-02-29 RX ORDER — METOPROLOL TARTRATE 1 MG/ML
1 INJECTION, SOLUTION INTRAVENOUS
Status: DISCONTINUED | OUTPATIENT
Start: 2024-02-29 | End: 2024-02-29 | Stop reason: HOSPADM

## 2024-02-29 RX ORDER — SODIUM CHLORIDE, SODIUM LACTATE, POTASSIUM CHLORIDE, CALCIUM CHLORIDE 600; 310; 30; 20 MG/100ML; MG/100ML; MG/100ML; MG/100ML
INJECTION, SOLUTION INTRAVENOUS CONTINUOUS
Status: DISCONTINUED | OUTPATIENT
Start: 2024-02-29 | End: 2024-02-29 | Stop reason: HOSPADM

## 2024-02-29 RX ORDER — HYDRALAZINE HYDROCHLORIDE 20 MG/ML
5 INJECTION INTRAMUSCULAR; INTRAVENOUS
Status: DISCONTINUED | OUTPATIENT
Start: 2024-02-29 | End: 2024-02-29 | Stop reason: HOSPADM

## 2024-02-29 RX ORDER — LIDOCAINE HYDROCHLORIDE 20 MG/ML
INJECTION, SOLUTION EPIDURAL; INFILTRATION; INTRACAUDAL; PERINEURAL PRN
Status: DISCONTINUED | OUTPATIENT
Start: 2024-02-29 | End: 2024-02-29 | Stop reason: SURG

## 2024-02-29 RX ORDER — HYDROMORPHONE HYDROCHLORIDE 1 MG/ML
0.2 INJECTION, SOLUTION INTRAMUSCULAR; INTRAVENOUS; SUBCUTANEOUS
Status: DISCONTINUED | OUTPATIENT
Start: 2024-02-29 | End: 2024-02-29 | Stop reason: HOSPADM

## 2024-02-29 RX ORDER — ONDANSETRON 2 MG/ML
4 INJECTION INTRAMUSCULAR; INTRAVENOUS
Status: DISCONTINUED | OUTPATIENT
Start: 2024-02-29 | End: 2024-02-29 | Stop reason: HOSPADM

## 2024-02-29 RX ORDER — SODIUM CHLORIDE, SODIUM LACTATE, POTASSIUM CHLORIDE, CALCIUM CHLORIDE 600; 310; 30; 20 MG/100ML; MG/100ML; MG/100ML; MG/100ML
INJECTION, SOLUTION INTRAVENOUS
Status: DISCONTINUED | OUTPATIENT
Start: 2024-02-29 | End: 2024-02-29 | Stop reason: SURG

## 2024-02-29 RX ORDER — EPHEDRINE SULFATE 50 MG/ML
5 INJECTION, SOLUTION INTRAVENOUS
Status: DISCONTINUED | OUTPATIENT
Start: 2024-02-29 | End: 2024-02-29 | Stop reason: HOSPADM

## 2024-02-29 RX ORDER — LABETALOL HYDROCHLORIDE 5 MG/ML
5 INJECTION, SOLUTION INTRAVENOUS
Status: DISCONTINUED | OUTPATIENT
Start: 2024-02-29 | End: 2024-02-29 | Stop reason: HOSPADM

## 2024-02-29 RX ADMIN — LIDOCAINE HYDROCHLORIDE 50 MG: 20 INJECTION, SOLUTION EPIDURAL; INFILTRATION; INTRACAUDAL at 08:22

## 2024-02-29 RX ADMIN — PROPOFOL 20 MG: 10 INJECTION, EMULSION INTRAVENOUS at 08:24

## 2024-02-29 RX ADMIN — HYDRALAZINE HYDROCHLORIDE 10 MG: 20 INJECTION, SOLUTION INTRAMUSCULAR; INTRAVENOUS at 08:35

## 2024-02-29 RX ADMIN — PROPOFOL 40 MG: 10 INJECTION, EMULSION INTRAVENOUS at 08:22

## 2024-02-29 RX ADMIN — PROPOFOL 40 MG: 10 INJECTION, EMULSION INTRAVENOUS at 08:30

## 2024-02-29 RX ADMIN — PROPOFOL 40 MG: 10 INJECTION, EMULSION INTRAVENOUS at 08:34

## 2024-02-29 RX ADMIN — PROPOFOL 40 MG: 10 INJECTION, EMULSION INTRAVENOUS at 08:38

## 2024-02-29 RX ADMIN — PROPOFOL 40 MG: 10 INJECTION, EMULSION INTRAVENOUS at 08:42

## 2024-02-29 RX ADMIN — FENTANYL CITRATE 50 MCG: 50 INJECTION, SOLUTION INTRAMUSCULAR; INTRAVENOUS at 08:30

## 2024-02-29 RX ADMIN — PROPOFOL 40 MG: 10 INJECTION, EMULSION INTRAVENOUS at 08:46

## 2024-02-29 RX ADMIN — PROPOFOL 40 MG: 10 INJECTION, EMULSION INTRAVENOUS at 08:52

## 2024-02-29 RX ADMIN — FENTANYL CITRATE 50 MCG: 50 INJECTION, SOLUTION INTRAMUSCULAR; INTRAVENOUS at 08:22

## 2024-02-29 RX ADMIN — SODIUM CHLORIDE, POTASSIUM CHLORIDE, SODIUM LACTATE AND CALCIUM CHLORIDE: 600; 310; 30; 20 INJECTION, SOLUTION INTRAVENOUS at 08:18

## 2024-02-29 RX ADMIN — PROPOFOL 20 MG: 10 INJECTION, EMULSION INTRAVENOUS at 08:26

## 2024-02-29 ASSESSMENT — PAIN DESCRIPTION - PAIN TYPE: TYPE: CHRONIC PAIN

## 2024-02-29 ASSESSMENT — PAIN SCALES - GENERAL: PAIN_LEVEL: 1

## 2024-02-29 NOTE — DISCHARGE INSTRUCTIONS
If any questions arise, call your provider.  If your provider is not available, please feel free to call the Surgical Center at [phone].    MEDICATIONS: Resume taking daily medication.  Take prescribed pain medication with food.  If no medication is prescribed, you may take non-aspirin pain medication if needed.  PAIN MEDICATION CAN BE VERY CONSTIPATING.  Take a stool softener or laxative such as senokot, pericolace, or milk of magnesia if needed.    Last pain medication given: None      ENDOSCOPY HOME CARE INSTRUCTIONS      COLONOSCOPY OR FLEXIBLE SIGMOIDOSCOPY  1. If you received a barium enema, take a mild laxative such as dulcolax, Coral's M.O., or Milk of Magnesia to clean out the barium.  2. Drink plenty of fluids. Eat a diet high in fiber (whole-grain breads, fresh fruit and vegetables).  3. You may notice a few drops of blood with your first bowel movement. If you develop any large amount of bleeding, black stools, a fever, or abdominal pain, call your doctor right away.  4. Avoid aspirin and all NSAIDs for 5 days.    Dr. Rivero [phone]    You should call 911 if you develop problems with breathing or chest pain.  If any questions arise, call your doctor. If your doctor is not available, please feel free to call [phone].

## 2024-02-29 NOTE — ANESTHESIA POSTPROCEDURE EVALUATION
Patient: Gia Farmer    Procedure Summary       Date: 02/29/24 Room / Location:  ENDOSCOPIC ULTRASOUND ROOM / SURGERY [facility]    Anesthesia Start: 0818 Anesthesia Stop: 0904    Procedure: COLONOSCOPY WITH BIOPSY (Abdomen) Diagnosis: (POSITIVE COLOGUARD, COLON POLYPS)    Surgeons: Edwin Rivero M.D. Responsible Provider: Pablo Hale D.O.    Anesthesia Type: MAC ASA Status: 2            Final Anesthesia Type: MAC  Last vitals  BP   Blood Pressure : (!) 140/65    Temp   36.1 °C (97 °F)    Pulse   (!) 59   Resp   20    SpO2   95 %      Anesthesia Post Evaluation    Patient location during evaluation: PACU  Patient participation: complete - patient participated  Level of consciousness: awake and alert  Pain score: 1    Airway patency: patent  Anesthetic complications: no  Cardiovascular status: hemodynamically stable  Respiratory status: acceptable  Hydration status: euvolemic    PONV: none          No notable events documented.     Nurse Pain Score: 1 (NPRS)

## 2024-02-29 NOTE — ANESTHESIA TIME REPORT
Anesthesia Start and Stop Event Times       Date Time Event    2/29/2024 0816 Ready for Procedure     0818 Anesthesia Start     0904 Anesthesia Stop          Responsible Staff  02/29/24      Name Role Begin End    Pablo Hale D.O. Anesth 0818 0904          Overtime Reason:  no overtime (within assigned shift)    Comments:

## 2024-02-29 NOTE — OR NURSING
0901: To PACU post colonoscopy w/ biopsy. Pt sleeping. Breathing is spontaneous and unlabored.    0910: Pt is awake and alert. Has pain of 1/10, no nausea.    0920: Dr. Rivero at bedside.    0934: Pain remains 1/10 w/o nausea. Meets criteria for stage ll.

## 2024-02-29 NOTE — OR NURSING
0745   Pt allergies and NPO status verified.  Home medications reconciled.  Belongings secured.  Pt verbalizes understanding of pain scale, expected course of stay, and plan of care.  Surgical site verified with pt.  IV access established.  All questions answered.  Bed in low position.  Call light in reach.

## 2024-02-29 NOTE — OP REPORT
DATE OF SERVICE:  02/29/2024     GASTROENTEROLOGY PROCEDURE NOTE     PHYSICIAN:  Edwin Rivero MD     ANESTHESIOLOGIST:  Dr. Hale.     MEDICATION:  Deep sedation.     PREOPERATIVE DIAGNOSIS:  Colon polyps.     POSTOPERATIVE DIAGNOSES:  Colonoscopy with endoscopic mucosal resection with   removal of polyps from the sigmoid colon, descending colon and hepatic flexure   utilizing combination of saline injection, hot snare polypectomy, control of   bleeding with clip and biopsy.     INDICATIONS:  Procedure risks and benefits reviewed thoroughly with the   patient.  Risks included to bleeding, perforation, side effects of medication   were informed.  The patient voiced understanding and agreed to proceed.     DESCRIPTION OF PROCEDURE:  The patient was placed in the left lateral   decubitus position.  Rectal examination revealed no palpable abnormalities and   a colonoscope was advanced into the sigmoid colon where a 20 mm to 30 mm   polyp on a stalk was appreciated.  The stalk was broad base and was injected   with 5 mL of normal saline with adequate lift, after which a 15 mm snare was   utilized to transect the entire lesion on single approach.  Minimal bleeding   was appreciated and was controlled utilizing placement of clip.  The polyp was   collected and then brought out through the anus.  Scope was readvanced to the   descending colon where a 10-15 mm polyp was appreciated next to a 10 mm   polyp, both these polyps were injected and then hot snared.  No appreciation   of any blood loss was appreciated.  The scope was then advanced to the cecum   and at the hepatic flexure, a 15 mm polyp with a 20 mm base was appreciated.    Approximately 10-15 mL of normal saline was injected first proximally and then   distally to allow for complete lift. This polyp was then resected in a   piecemeal fashion.  Approximately 95% of the polyp was removed upon single   first approach and residual polyp was then removed  subsequently.  There was   minimal residual polyp that was then removed by biopsy forceps.  Adjacent to   the hepatic flexure polyp, an additional 5 mm polyp was appreciated.  This was   biopsied and removed.  The polyp was then brought out through the anus.  All   pieces were collected.  Please note prior to removal of this large polyp, 2   clips were placed for complete closure, there was minimal oozing that was   controlled with clip closure.  The scope was removed.     COMPLICATIONS:  None.     BLOOD LOSS:  5-10 mL.     SPECIMENS:  Obtained.     RECOMMENDATIONS:  The patient has number of polyps, at least 6 polyps were   removed today.  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  Repeat colonoscopy in 3 months' time with me at [facility] for reevaluation and removal of any additional polyps and reevaluation   of endoscopic mucosal resection sites.  The patient is an avid smoker and was   advised to avoid smoking for at least 4 weeks to allow for adequate healing.    Importance of dietary changes, hydration, importance of nutrition were   discussed with the patient.  Avoidance of all NSAIDs, aspirin, ibuprofen,   Motrin, Advil, Aleve, etc. were also advised for 4 weeks.  The patient and her   friend, relative who was present voiced understanding of all these   recommendations and agreed to comply.        ______________________________  MD CORY Toro/JACKSON    DD:  02/29/2024 09:37  DT:  02/29/2024 09:54    Job#:  [id number]

## 2024-02-29 NOTE — ANESTHESIA PREPROCEDURE EVALUATION
Case: [id number] Date/Time: 02/29/24 0815    Procedure: COLONOSCOPY WITH BIOPSY (Abdomen)    Anesthesia type: General    Pre-op diagnosis: POSITIVE COLOGUARD, COLON POLYPS    Location:  ENDOSCOPIC ULTRASOUND ROOM / SURGERY [facility]    Surgeons: Edwin Rivero M.D.            Relevant Problems   NEURO   (positive) Chronic post-traumatic headache, not intractable   (positive) Seizures (HCC)      CARDIAC   (positive) Essential hypertension      ENDO   (positive) Type 2 diabetes mellitus without complication, without long-term current use of insulin (HCC)       Physical Exam    Airway   Mallampati: II  TM distance: >3 FB  Neck ROM: full       Cardiovascular - normal exam  Rhythm: regular  Rate: normal  (-) murmur     Dental - normal exam           Pulmonary - normal exam  Breath sounds clear to auscultation     Abdominal    Neurological - normal exam                   Anesthesia Plan    ASA 2       Plan - MAC               Induction: intravenous      Pertinent diagnostic labs and testing reviewed    Informed Consent:    Anesthetic plan and risks discussed with patient.    Use of blood products discussed with: patient whom consented to blood products.

## 2024-03-18 PROBLEM — Z85.038 HISTORY OF COLON CANCER: Status: ACTIVE | Noted: 2024-03-18

## 2024-03-18 PROBLEM — Z87.898 HISTORY OF SEIZURE: Status: ACTIVE | Noted: 2024-03-18

## 2024-03-18 PROBLEM — E11.65 TYPE 2 DIABETES MELLITUS WITH HYPERGLYCEMIA, WITHOUT LONG-TERM CURRENT USE OF INSULIN (HCC): Status: ACTIVE | Noted: 2017-10-23

## 2024-04-29 PROBLEM — Z79.4 TYPE 2 DIABETES MELLITUS WITH HYPERGLYCEMIA, WITH LONG-TERM CURRENT USE OF INSULIN (HCC): Status: ACTIVE | Noted: 2017-10-23

## 2024-05-09 DIAGNOSIS — I10 ESSENTIAL HYPERTENSION: ICD-10-CM

## 2024-05-09 RX ORDER — HYDROCHLOROTHIAZIDE 12.5 MG/1
12.5 CAPSULE, GELATIN COATED ORAL DAILY
Qty: 90 CAPSULE | Refills: 3 | OUTPATIENT
Start: 2024-05-09

## 2024-05-09 RX ORDER — LISINOPRIL AND HYDROCHLOROTHIAZIDE 12.5; 1 MG/1; MG/1
1 TABLET ORAL DAILY
Qty: 90 TABLET | Refills: 3 | Status: SHIPPED | OUTPATIENT
Start: 2024-05-09

## 2024-05-09 NOTE — TELEPHONE ENCOUNTER
Received request via: Pharmacy    Was the patient seen in the last year in this department? Yes    Does the patient have an active prescription (recently filled or refills available) for medication(s) requested? No    Pharmacy Name: [facility] [place]     Does the patient have custodial Plus and need 100 day supply (blood pressure, diabetes and cholesterol meds only)? Yes, quantity updated to 100 days

## 2024-05-13 ENCOUNTER — OFFICE VISIT (OUTPATIENT)
Dept: FAMILY PLANNING/WOMEN'S HEALTH CLINIC | Facility: PHYSICIAN GROUP | Age: 69
End: 2024-05-13
Payer: MEDICARE

## 2024-05-13 VITALS
HEIGHT: 67 IN | DIASTOLIC BLOOD PRESSURE: 84 MMHG | BODY MASS INDEX: 19.62 KG/M2 | WEIGHT: 125 LBS | SYSTOLIC BLOOD PRESSURE: 152 MMHG

## 2024-05-13 DIAGNOSIS — E11.65 TYPE 2 DIABETES MELLITUS WITH HYPERGLYCEMIA, WITH LONG-TERM CURRENT USE OF INSULIN (HCC): ICD-10-CM

## 2024-05-13 DIAGNOSIS — Z13.820 ENCOUNTER FOR OSTEOPOROSIS SCREENING IN ASYMPTOMATIC POSTMENOPAUSAL PATIENT: ICD-10-CM

## 2024-05-13 DIAGNOSIS — Z79.4 TYPE 2 DIABETES MELLITUS WITH HYPERGLYCEMIA, WITH LONG-TERM CURRENT USE OF INSULIN (HCC): ICD-10-CM

## 2024-05-13 DIAGNOSIS — Z78.0 ENCOUNTER FOR OSTEOPOROSIS SCREENING IN ASYMPTOMATIC POSTMENOPAUSAL PATIENT: ICD-10-CM

## 2024-05-13 DIAGNOSIS — I10 DIABETES MELLITUS WITH COINCIDENT HYPERTENSION (HCC): ICD-10-CM

## 2024-05-13 DIAGNOSIS — E11.69 TYPE 2 DIABETES MELLITUS WITH HYPERLIPIDEMIA (HCC): ICD-10-CM

## 2024-05-13 DIAGNOSIS — E05.00 GRAVES' DISEASE: ICD-10-CM

## 2024-05-13 DIAGNOSIS — E78.5 TYPE 2 DIABETES MELLITUS WITH HYPERLIPIDEMIA (HCC): ICD-10-CM

## 2024-05-13 DIAGNOSIS — E11.9 DIABETES MELLITUS WITH COINCIDENT HYPERTENSION (HCC): ICD-10-CM

## 2024-05-13 DIAGNOSIS — Z72.0 TOBACCO USE: ICD-10-CM

## 2024-05-13 DIAGNOSIS — Z87.898 HISTORY OF SEIZURE: ICD-10-CM

## 2024-05-13 PROBLEM — G44.329 CHRONIC POST-TRAUMATIC HEADACHE, NOT INTRACTABLE: Status: RESOLVED | Noted: 2017-03-23 | Resolved: 2024-05-13

## 2024-05-13 PROCEDURE — 3079F DIAST BP 80-89 MM HG: CPT

## 2024-05-13 PROCEDURE — 1126F AMNT PAIN NOTED NONE PRSNT: CPT

## 2024-05-13 PROCEDURE — 3077F SYST BP >= 140 MM HG: CPT

## 2024-05-13 PROCEDURE — RXMED WILLOW AMBULATORY MEDICATION CHARGE

## 2024-05-13 PROCEDURE — G0439 PPPS, SUBSEQ VISIT: HCPCS

## 2024-05-13 SDOH — ECONOMIC STABILITY: TRANSPORTATION INSECURITY
IN THE PAST 12 MONTHS, HAS LACK OF TRANSPORTATION KEPT YOU FROM MEETINGS, WORK, OR FROM GETTING THINGS NEEDED FOR DAILY LIVING?: NO

## 2024-05-13 SDOH — ECONOMIC STABILITY: HOUSING INSECURITY
IN THE LAST 12 MONTHS, WAS THERE A TIME WHEN YOU DID NOT HAVE A STEADY PLACE TO SLEEP OR SLEPT IN A SHELTER (INCLUDING NOW)?: NO

## 2024-05-13 SDOH — ECONOMIC STABILITY: TRANSPORTATION INSECURITY
IN THE PAST 12 MONTHS, HAS THE LACK OF TRANSPORTATION KEPT YOU FROM MEDICAL APPOINTMENTS OR FROM GETTING MEDICATIONS?: NO

## 2024-05-13 SDOH — ECONOMIC STABILITY: FOOD INSECURITY: WITHIN THE PAST 12 MONTHS, THE FOOD YOU BOUGHT JUST DIDN'T LAST AND YOU DIDN'T HAVE MONEY TO GET MORE.: OFTEN TRUE

## 2024-05-13 SDOH — ECONOMIC STABILITY: INCOME INSECURITY: IN THE LAST 12 MONTHS, WAS THERE A TIME WHEN YOU WERE NOT ABLE TO PAY THE MORTGAGE OR RENT ON TIME?: NO

## 2024-05-13 SDOH — ECONOMIC STABILITY: FOOD INSECURITY: WITHIN THE PAST 12 MONTHS, YOU WORRIED THAT YOUR FOOD WOULD RUN OUT BEFORE YOU GOT MONEY TO BUY MORE.: OFTEN TRUE

## 2024-05-13 SDOH — ECONOMIC STABILITY: INCOME INSECURITY: HOW HARD IS IT FOR YOU TO PAY FOR THE VERY BASICS LIKE FOOD, HOUSING, MEDICAL CARE, AND HEATING?: HARD

## 2024-05-13 SDOH — ECONOMIC STABILITY: HOUSING INSECURITY: IN THE LAST 12 MONTHS, HOW MANY PLACES HAVE YOU LIVED?: 1

## 2024-05-13 ASSESSMENT — ACTIVITIES OF DAILY LIVING (ADL): BATHING_REQUIRES_ASSISTANCE: 0

## 2024-05-13 ASSESSMENT — PAIN SCALES - GENERAL: PAINLEVEL: NO PAIN

## 2024-05-13 ASSESSMENT — PATIENT HEALTH QUESTIONNAIRE - PHQ9: CLINICAL INTERPRETATION OF PHQ2 SCORE: 0

## 2024-05-13 ASSESSMENT — ENCOUNTER SYMPTOMS: GENERAL WELL-BEING: FAIR

## 2024-05-13 NOTE — LETTER
Food is Medicine   Take this Food Prescription to any of the participating [facility] food pantries listed below for food assistance & resources. The food pantry will collect the tear-off section below.   With this prescription, you may visit each pantry once per week. There is no limit to the number of different pantries you can use.  Location Pantry Hours    1 [place] [facility]  [address], [place] Tuesdays 6pm-7pm  Wednesdays [id number]  Closed for June 2 [facility]  [address] [place] Wednesdays 10am-Noon; Saturdays 9-11am  4th Wednesday 5:30-7pm  (March-Sep ONLY)    3 [facility]  [address] [place] Thursdays 11am-3pm    4 [facility]  [address] [place] Tuesday & Wednesday  10am-12pm  Thursdays 4:30pm-6pm    [facility] ([place]  [address], [place] Monday- Friday  8a-5p  [facility] Patients ONLY    6 [facility] ([place])  [address], [place] Monday- Friday  8a-5p  [facility] Patients ONLY    7 [facility] Food  Pantry ([place])  [address] Monday - Friday  8am-5pm  [facility] Patients ONLY    8 [facility]  [address] [place], [place] Monday, Tuesday & Thursday 1-3pm  3rd Wednesday 5-7pm   Your prescription expires on 05/13/25  Please schedule an appointment with your doctor to renew your prescription.     ?-----------------------------------------------------------------------------------------------------------------------------  For Clinician and Food Pantry Use Only; to be removed by Food Pantry Personnel  Date: 5/13/2024  Patient Name: Gia Farmer  YOB: 1955  Referring Facility: [place] [id number]  Prescription Expiration Date: 05/13/25  PRESCRIPTION   (Only choose one; Diabetic and Heart Disease patients automatically receive additional food):  [] Additional Food Resources Only        [] Carbohydrate Controlled Diet         [] Heart Healthy Diet         Additional Resources Available   SNAP Application Assistance Kids Caf[employer] information Senior Food Resources   WIC Referrals  Kids Backpack program Employment Assistance   Medicaid Application Assistance Connection to other state programs    Healthy Food is Good Medicine  Healthy food is important for good health. Nutritious food helps our bodies heal, keeps our hearts strong, and gives us the energy we need to lead active lives. We know healthy food is not always easy to come by, that is why your doctor recommends using a Prescription Pantry. These pantries make sure you have the nutritious food you need to stay healthy.   Using a Prescription Pantry is simple:  See your doctor  Obtain a prescription for food  Take that prescription to one of eight pantries  Get healthy food and other assistance  You do not need to show proof of income. Your prescription will be kept on file so you can visit more than one pantry with a single prescription. There is no limit to the number of different pantries you can use and remember - you can visit a pantry each week.    This is a Food Bank [facility] program and not a Federal Assistance Program.    Your information will be kept private and not shared with any other entity.

## 2024-05-13 NOTE — ASSESSMENT & PLAN NOTE
Chronic, stable. Discussed with the patient the importance of checking her blood pressure at home, recording it and bringing the list with her to her next PCP on 5/22/2024.  Continue with current defined treatment plan: lisinopril-hydrochlorothiazide (PRINZIDE) 10-12.5 MG per tablet, atenolol (TENORMIN) 25 MG Tab . Follow-up at least annually.

## 2024-05-13 NOTE — ASSESSMENT & PLAN NOTE
Chronic, stable.  Discussed with the patient that she will start checking her blood pressure at home and will bring her readings to her next PCP appointment on 5/22/2024.  Continue with current defined treatment plan: lisinopril-hydrochlorothiazide (PRINZIDE) 10-12.5 MG per tablet, atenolol (TENORMIN) 25 MG Tab . Follow-up at least annually.

## 2024-05-13 NOTE — ASSESSMENT & PLAN NOTE
Discussed with the patient the importance of getting regular osteoporosis screenings. The patient verbalized understanding and agreement.  Orders for DS-BONE DENSITY STUDY (DEXA) placed.

## 2024-05-13 NOTE — ASSESSMENT & PLAN NOTE
Chronic, stable. The patient reports that she had a few seizures when she was in her 20's. She has not had any seizures since. No current treatment.  Follow-up at least annually.

## 2024-05-13 NOTE — LETTER
Food is Medicine   Take this Food Prescription to any of the participating [facility] food pantries listed below for food assistance & resources. The food pantry will collect the tear-off section below.   With this prescription, you may visit each pantry once per week. There is no limit to the number of different pantries you can use.  Location Pantry Hours    1 [place] [facility]  [address], [place] Tuesdays 6pm-7pm  Wednesdays [id number]  Closed for June 2 [facility]  [address] [place] Wednesdays 10am-Noon; Saturdays 9-11am  4th Wednesday 5:30-7pm  (March-Sep ONLY)    3 [facility]  [address] [place] Thursdays 11am-3pm    4 [facility]  [address] [place] Tuesday & Wednesday  10am-12pm  Thursdays 4:30pm-6pm    5 [facility] ([place])  [address], [place] Monday- Friday  8a-5p  [facility] Patients ONLY    6 [facility] ([facility]) Food Pantry ([place])  [address], [place] Monday- Friday  8a-5p  [facility] Patients ONLY    7 [facility] ([facility] Food  Pantry ([place])  [address] [place] [address] Monday - Friday  8am-5pm  [facility] Patients ONLY    8 [facility]  [address] [place], [place] Monday, Tuesday & Thursday 1-3pm  3rd Wednesday 5-7pm   Your prescription expires on 05/13/25  Please schedule an appointment with your doctor to renew your prescription.     ?-----------------------------------------------------------------------------------------------------------------------------  For Clinician and Food Pantry Use Only; to be removed by Food Pantry Personnel  Date: 5/13/2024  Patient Name: Gia Farmer  YOB: 1955  Referring Facility: [place] [id number]  Prescription Expiration Date: 05/13/25  PRESCRIPTION   (Only choose one; Diabetic and Heart Disease patients automatically receive additional food):  [] Additional Food Resources Only        [] Carbohydrate Controlled Diet         [] Heart Healthy Diet         Additional Resources Available   SNAP Application Assistance Kids Caf[employer] information Senior Food Resources   WIC Referrals  Kids Backpack program Employment Assistance   Medicaid Application Assistance Connection to other state programs    Healthy Food is Good Medicine  Healthy food is important for good health. Nutritious food helps our bodies heal, keeps our hearts strong, and gives us the energy we need to lead active lives. We know healthy food is not always easy to come by, that is why your doctor recommends using a Prescription Pantry. These pantries make sure you have the nutritious food you need to stay healthy.   Using a Prescription Pantry is simple:  See your doctor  Obtain a prescription for food  Take that prescription to one of eight pantries  Get healthy food and other assistance  You do not need to show proof of income. Your prescription will be kept on file so you can visit more than one pantry with a single prescription. There is no limit to the number of different pantries you can use and remember - you can visit a pantry each week.    This is a Food Bank [facility] program and not a Federal Assistance Program.    Your information will be kept private and not shared with any other entity.

## 2024-05-13 NOTE — ASSESSMENT & PLAN NOTE
Chronic, stable. Last A1c was 8.8 in February of 2024. Continue with current defined treatment plan: pioglitazone (ACTOS) 30 MG Tab, insulin glargine (LANTUS SOLOSTAR) 100 UNIT/ML Solution Pen-injector injection . Follow-up at least annually.

## 2024-05-13 NOTE — ASSESSMENT & PLAN NOTE
Chronic, stable.  The patient has an appointment with endocrinology on 7/16/2024.  Continue with current defined treatment plan: methimazole (TAPAZOLE) 10 MG Tab . Follow-up at least annually.

## 2024-05-13 NOTE — ASSESSMENT & PLAN NOTE
Chronic, stable. The patient reports that she has been smoking several cigarettes per day. She is not ready to quit at this time. Counseling provided.  Follow-up at least annually.

## 2024-05-13 NOTE — PROGRESS NOTES
Comprehensive Health Assessment Program     Gia Farmer is a 68 y.o. here for her comprehensive health assessment.    Patient Active Problem List    Diagnosis Date Noted    Diabetes mellitus with coincident hypertension ([facility]) 05/13/2024    Type 2 diabetes mellitus with hyperlipidemia ([facility]) 05/13/2024    Encounter for osteoporosis screening in asymptomatic postmenopausal patient 05/13/2024    History of seizure 03/18/2024    History of colon cancer 03/18/2024    Body mass index (BMI) 19.9 or less, adult 03/18/2024    Type 2 diabetes mellitus with hyperglycemia, with long-term current use of insulin ([facility]) 10/23/2017    Tobacco use 04/21/2017    Graves' disease 01/09/2014    Seizures ([facility])        Current Outpatient Medications   Medication Sig Dispense Refill    lisinopril-hydrochlorothiazide (PRINZIDE) 10-12.5 MG per tablet Take 1 Tablet by mouth every day. 90 Tablet 3    polyethylene glycol-electrolytes (GOLYTELY) 236 GM Recon Soln Mix and drink as directed. Follow directions on [facility] or call [phone] with questions. 4000 mL 0    insulin glargine (LANTUS SOLOSTAR) 100 UNIT/ML Solution Pen-injector injection If morning fasting blood sugar is over 160 inject 14 units subcutaneously once daily.  If fasting blood sugar is between 120-159 inject 12 unit subcutaneously once daily. If fasting blood sugar is below 120 inject 10 units subcutaneously once daily. 15 mL 5    simvastatin (ZOCOR) 20 MG Tab Take 1 Tablet by mouth every evening. 90 Tablet 3    polyethylene glycol-electrolytes (NULYTELY) 420 GM solution Use as instructed by office 4000 mL 0    pioglitazone (ACTOS) 30 MG Tab Take 30 mg by mouth every day. Takes PRN now      GOLYTELY 236 g Recon Soln Use as instructed by office 4000 mL 0    methimazole (TAPAZOLE) 10 MG Tab Take 1 tablet by mouth 2 times a day. 180 Tablet 3    atenolol (TENORMIN) 25 MG Tab Take 1 tablet by mouth every day. 90 Tablet 3    polyethylene glycol-electrolytes  (GOLYTELY) 236 GM Recon Soln Use as instructed by office. 4000 mL 0    Misc. Devices Misc One glucerna daily 90 Each 3    Blood Glucose Monitoring Suppl (TRUE METRIX METER) w/Device Kit Test blood sugar as recommended by provider.  Dx E 11.65 1 Kit PRN    glucose blood (TRUE METRIX BLOOD GLUCOSE TEST) strip For test strips: Use 3x/day and as needed for signs and symptoms of high or low blood sugar. Dx E 11.65 300 Strip 11    Lancets Use 3 times daily and as needed for signs and symptoms of high or low blood sugar. Dx E 11.65 100 Each 3    albuterol (PROVENTIL HFA) 108 (90 Base) MCG/ACT Aero Soln inhalation aerosol Inhale 2 Puffs every four hours as needed for Shortness of Breath. 2 g 3     No current facility-administered medications for this visit.          Current supplements as per medication list.     Allergies:   Patient has no known allergies.  Social History     Tobacco Use    Smoking status: Every Day     Current packs/day: 0.25     Average packs/day: 0.3 packs/day for 27.4 years (6.8 ttl pk-yrs)     Types: Cigarettes     Start date: 1997    Smokeless tobacco: Never    Tobacco comments:     started at 42/3 cigs/day   Vaping Use    Vaping Use: Never used   Substance Use Topics    Alcohol use: Not Currently    Drug use: No     Family History   Problem Relation Age of Onset    Diabetes Mother     Heart Disease Mother         MI    Hypertension Mother     Stroke Father     Hypertension Father     Diabetes Sister     Colon Cancer Maternal Grandmother 80     Gia  has a past medical history of Anxiety, Arrhythmia, Breath shortness, Chronic post-traumatic headache, not intractable (03/23/2017), Dental disorder, Depression, Diabetes (HCC), Grave's disease, High cholesterol, Hypertension, Meningitis (1950), and Seizures (HCC).   Past Surgical History:   Procedure Laterality Date    ID COLONOSCOPY,DIAGNOSTIC N/A 2/29/2024    Procedure: COLONOSCOPY WITH BIOPSY;  Surgeon: Edwin Rivero M.D.;  Location: SURGERY [facility]  [place];  Service: Gastroenterology    ACHILLES TENDON REPAIR Right     TUBAL LIGATION         Screening:  In the last six months have you experienced any leakage of urine? No    Depression Screening  Little interest or pleasure in doing things?  0 - not at all  Feeling down, depressed , or hopeless? 0 - not at all  Patient Health Questionnaire Score: 0     If depressive symptoms identified deferred to follow up visit unless specifically addressed in assessment and plan.    Interpretation of PHQ-9 Total Score   Score Severity   1-4 No Depression   5-9 Mild Depression   10-14 Moderate Depression   15-19 Moderately Severe Depression   20-27 Severe Depression    Screening for Cognitive Impairment  Do you or any of your friends or family members have any concern about your memory? No  Three Minute Recall (Leader, Season, Table) 3/3    Vineet clock face with all 12 numbers and set the hands to show 10 minutes after 11.  Yes 5/5  Cognitive concerns identified deferred for follow up unless specifically addressed in assessment and plan.    Fall Risk Assessment  Has the patient had two or more falls in the last year or any fall with injury in the last year?  No    Safety Assessment  Do you always wear your seatbelt?  Yes  Any changes to home needed to function safely? No  Difficulty hearing.  No  Patient counseled about all safety risks that were identified.    Functional Assessment ADLs  Are there any barriers preventing you from cooking for yourself or meeting nutritional needs?  No.    Are there any barriers preventing you from driving safely or obtaining transportation?  No.    Are there any barriers preventing you from using a telephone or calling for help?  No    Are there any barriers preventing you from shopping?  No.    Are there any barriers preventing you from taking care of your own finances?  No    Are there any barriers preventing you from managing your medications?  No    Are there any barriers preventing you  from showering, bathing or dressing yourself? No    Are there any barriers preventing you from doing housework or laundry? No  Are there any barriers preventing you from using the toilet?No  Are you currently engaging in any exercise or physical activity?  Yes.      Self-Assessment of Health  What is your perception of your health? Fair  Do you sleep more than six hours a night? Yes  In the past 7 days, how much did pain keep you from doing your normal work? None  Do you spend quality time with family or friends (virtually or in person)? Yes  Do you usually eat a heart healthy diet that constists of a variety of fruits, vegetables, whole grains and fiber? Yes  Do you eat foods high in fat and/or Fast Food more than three times per week? No    Advance Care Planning  Do you have an Advance Directive, Living Will, Durable Power of , or POLST? No                 Health Maintenance Summary            Overdue - Zoster (Shingles) Vaccines (1 of 2) Never done      No completion history exists for this topic.              Ordered - Bone Density Scan (Every 5 Years) Ordered on 5/13/2024 06/02/2009  DS-BONE DENSITY STUDY (DEXA)              Overdue - Pneumococcal Vaccine: 65+ Years (2 of 2 - PCV) Overdue since 9/30/2015 09/30/2014  Imm Admin: Pneumococcal polysaccharide vaccine (PPSV-23)              Overdue - COVID-19 Vaccine (2 - 2023-24 season) Overdue since 9/1/2023      04/10/2021  Imm Admin: [place] SARS-CoV-2 Vaccine              Overdue - Diabetes: Monofilament / LE Exam (Yearly) Overdue since 4/12/2024 04/12/2023  Diabetic Monofilament LE Exam    01/11/2022  Diabetic Monofilament Lower Extremity Exam    09/22/2020  Diabetic Monofilament Lower Extremity Exam    07/08/2019  Diabetic Monofilament Lower Extremity Exam    04/26/2018  Diabetic Monofilament LE Exam    Only the first 5 history entries have been loaded, but more history exists.              Fasting Lipid Profile (Yearly) Due soon on  5/19/2024 05/19/2023  Lipid Profile    01/07/2022  Lipid Profile    09/16/2020  Lipid Profile    06/27/2019  Lipid Profile    01/24/2018  LIPID PROFILE    Only the first 5 history entries have been loaded, but more history exists.              Postponed - IMM DTaP/Tdap/Td Vaccine (1 - Tdap) Postponed until 2/26/2026      No completion history exists for this topic.              Diabetes: Retinopathy Screening (Yearly) Next due on 6/22/2024 06/22/2023  POCT Retinal Eye Exam    01/28/2022  REFERRAL FOR RETINAL SCREENING EXAM    01/28/2022  AMB EXTERNAL RETINAL SCREENING RESULTS    12/19/2016  POCT Retinal Eye Exam    03/31/2016  AMB REFERRAL FOR RETINAL SCREENING EXAM    Only the first 5 history entries have been loaded, but more history exists.              Diabetes: Urine Protein Screening (Yearly) Next due on 6/22/2024 06/22/2023  MICROALBUMIN CREAT RATIO URINE    01/10/2022  MICROALBUMIN CREAT RATIO URINE    09/16/2020  MICROALBUMIN CREAT RATIO URINE    06/27/2019  MICROALBUMIN CREAT RATIO URINE (LAB COLLECT)    04/26/2018  MICROALBUMIN CREAT RATIO URINE (CLINIC COLLECT)    Only the first 5 history entries have been loaded, but more history exists.              A1c Screening (Every 6 Months) Next due on 8/20/2024 02/20/2024  POCT  A1C    08/16/2023  POCT Hemoglobin A1C    04/12/2023  POCT Hemoglobin A1C    11/09/2022  POCT Hemoglobin A1C    06/01/2022  POCT Hemoglobin A1C    Only the first 5 history entries have been loaded, but more history exists.              Influenza Vaccine (Season Ended) Next due on 9/1/2024 09/30/2014  Imm Admin: INFLUENZA TIV (IM)    09/30/2014  Imm Admin: Influenza Seasonal Injectable - Historical Data              Mammogram (Yearly) Next due on 10/18/2024      10/18/2023  MA-SCREENING MAMMO BILAT W/TOMOSYNTHESIS W/CAD    10/11/2022  MA-SCREENING MAMMO BILAT W/TOMOSYNTHESIS W/CAD    08/20/2021  MA-SCREENING MAMMO BILAT W/TOMOSYNTHESIS W/CAD    11/13/2019   MA-SCREENING MAMMO BILAT W/TOMOSYNTHESIS W/CAD    05/11/2018  MA-MAMMO SCREENING BILAT W/CORI W/CAD    Only the first 5 history entries have been loaded, but more history exists.              SERUM CREATININE (Yearly) Next due on 2/22/2025 02/22/2024  Basic Metabolic Panel    11/14/2023  Basic Metabolic Panel    05/19/2023  Basic Metabolic Panel    01/07/2022  Comp Metabolic Panel    09/16/2020  Basic Metabolic Panel    Only the first 5 history entries have been loaded, but more history exists.              Annual Wellness Visit (Yearly) Next due on 5/13/2025 05/13/2024  Level of Service: [place] ANNUAL WELLNESS VISIT-INCLUDES PPPS SUBSEQUE*    03/18/2024  Level of Service: ANNUAL WELLNESS VISIT-INCLUDES PPPS SUBSEQUE*    02/19/2015  Done              Colorectal Cancer Screening (Colon Cancer Screening Cologuard Stool (FIT DNA) - Preferred) Next due on 2/28/2034 02/29/2024  Surgical Procedure: TN COLONOSCOPY,DIAGNOSTIC    08/09/2023  COLONOSCOPY RESULTS    05/08/2023  COLOGUARD COLON CANCER SCREENING    05/08/2023  COLOGUARD COLON CANCER SCREENING    08/28/2014  AMB REFERRAL TO GI FOR COLONOSCOPY    Only the first 5 history entries have been loaded, but more history exists.              Hepatitis C Screening  Completed      05/21/2018  Hepatitis C Antibody component of HEP C VIRUS ANTIBODY              Hepatitis A Vaccine (Hep A) (Series Information) Aged Out      No completion history exists for this topic.              Hepatitis B Vaccine (Hep B) (Series Information) Aged Out      No completion history exists for this topic.              HPV Vaccines (Series Information) Aged Out      No completion history exists for this topic.              Polio Vaccine (Inactivated Polio) (Series Information) Aged Out      No completion history exists for this topic.              Meningococcal Immunization (Series Information) Aged Out      No completion history exists for this topic.              Discontinued -  "Cervical Cancer Screening  Discontinued        Frequency changed to Never automatically (Topic No Longer Applies)    05/01/2018  PATHOLOGY GYN SPECIMEN    05/01/2018  THINPREP PAP WITH HPV    02/19/2015  THINPREP PAP W/HPV AND CTNG    02/19/2015  PATHOLOGY GYN SPECIMEN    Only the first 5 history entries have been loaded, but more history exists.                    Patient Care Team:  ALON Garland as PCP - General (Nurse Practitioner Family)  Elvin Monterroso M.D. as Consulting Physician (Endocrinology)      Financial Resource Strain: High Risk (5/13/2024)    Overall Financial Resource Strain (CARDIA)     Difficulty of Paying Living Expenses: Hard      Transportation Needs: No Transportation Needs (5/13/2024)    PRAPARE - Transportation     Lack of Transportation (Medical): No     Lack of Transportation (Non-Medical): No      Food Insecurity: Food Insecurity Present (5/13/2024)    Hunger Vital Sign     Worried About Running Out of Food in the Last Year: Often true     Ran Out of Food in the Last Year: Often true        Encounter Vitals  Blood Pressure : (!) 152/84  O2 Delivery Device: None - Room Air  Weight: 56.7 kg (125 lb)  Height: 170.2 cm (5' 7\")  BMI (Calculated): 19.58  Pain Score: No pain  DME  O2 Delivery Device: None - Room Air     Alert, oriented in no acute distress.  Eye contact is good, speech goal directed, affect calm.    Assessment and Plan. The following treatment and monitoring plan is recommended:  Essential hypertension  Chronic, stable. Discussed with the patient the importance of checking her blood pressure at home, recording it and bringing the list with her to her next PCP on 5/22/2024.  Continue with current defined treatment plan: lisinopril-hydrochlorothiazide (PRINZIDE) 10-12.5 MG per tablet, atenolol (TENORMIN) 25 MG Tab . Follow-up at least annually.      Graves' disease  Chronic, stable.  The patient has an appointment with endocrinology on 7/16/2024.  Continue with " current defined treatment plan: methimazole (TAPAZOLE) 10 MG Tab . Follow-up at least annually.      History of seizure  Chronic, stable. The patient reports that she had a few seizures when she was in her 20's. She has not had any seizures since. No current treatment.  Follow-up at least annually.      Tobacco use  Chronic, stable. The patient reports that she has been smoking several cigarettes per day. She is not ready to quit at this time. Counseling provided.  Follow-up at least annually.      Type 2 diabetes mellitus with hyperglycemia, with long-term current use of insulin (HCC)  Chronic, stable. Last A1c was 8.8 in February of 2024. Continue with current defined treatment plan: pioglitazone (ACTOS) 30 MG Tab, insulin glargine (LANTUS SOLOSTAR) 100 UNIT/ML Solution Pen-injector injection . Follow-up at least annually.      Diabetes mellitus with coincident hypertension (HCC)  Chronic, stable.  Discussed with the patient that she will start checking her blood pressure at home and will bring her readings to her next PCP appointment on 5/22/2024.  Continue with current defined treatment plan: lisinopril-hydrochlorothiazide (PRINZIDE) 10-12.5 MG per tablet, atenolol (TENORMIN) 25 MG Tab . Follow-up at least annually.    Encounter for osteoporosis screening in asymptomatic postmenopausal patient  Discussed with the patient the importance of getting regular osteoporosis screenings. The patient verbalized understanding and agreement.  Orders for DS-BONE DENSITY STUDY (DEXA) placed.      Services suggested: No services needed at this time  Health Care Screening: Age-appropriate preventive services recommended by USPTF and ACIP covered by Medicare were discussed today. Services ordered if indicated and agreed upon by the patient.  Referrals offered: Community-based lifestyle interventions to reduce health risks and promote self-management and wellness, fall prevention, nutrition, physical activity, tobacco-use  cessation, weight loss, and mental health services as per orders if indicated.    Discussion today about general wellness and lifestyle habits:    Prevent falls and reduce trip hazards; Cautioned about securing or removing rugs.  Have a working fire alarm and carbon monoxide detector.  Engage in regular physical activity and social activities.    Follow-up: Return for appointment with Primary Care Provider as needed.

## 2024-05-14 ENCOUNTER — PHARMACY VISIT (OUTPATIENT)
Dept: PHARMACY | Facility: MEDICAL CENTER | Age: 69
End: 2024-05-14
Payer: COMMERCIAL

## 2024-05-14 PROCEDURE — RXMED WILLOW AMBULATORY MEDICATION CHARGE

## 2024-05-15 ENCOUNTER — PHARMACY VISIT (OUTPATIENT)
Dept: PHARMACY | Facility: MEDICAL CENTER | Age: 69
End: 2024-05-15
Payer: COMMERCIAL

## 2024-05-16 ENCOUNTER — APPOINTMENT (OUTPATIENT)
Dept: MEDICAL GROUP | Facility: MEDICAL CENTER | Age: 69
End: 2024-05-16
Payer: MEDICARE

## 2024-05-28 ENCOUNTER — APPOINTMENT (OUTPATIENT)
Dept: RADIOLOGY | Facility: MEDICAL CENTER | Age: 69
End: 2024-05-28
Attending: SURGERY
Payer: MEDICARE

## 2024-07-09 ENCOUNTER — HOSPITAL ENCOUNTER (OUTPATIENT)
Facility: MEDICAL CENTER | Age: 69
End: 2024-07-09
Payer: MEDICARE

## 2024-07-09 ENCOUNTER — OFFICE VISIT (OUTPATIENT)
Dept: MEDICAL GROUP | Facility: MEDICAL CENTER | Age: 69
End: 2024-07-09
Payer: MEDICARE

## 2024-07-09 VITALS
HEART RATE: 58 BPM | DIASTOLIC BLOOD PRESSURE: 74 MMHG | WEIGHT: 130.6 LBS | HEIGHT: 67 IN | OXYGEN SATURATION: 98 % | SYSTOLIC BLOOD PRESSURE: 162 MMHG | BODY MASS INDEX: 20.5 KG/M2 | TEMPERATURE: 97 F

## 2024-07-09 DIAGNOSIS — Z63.4 BEREAVEMENT: ICD-10-CM

## 2024-07-09 DIAGNOSIS — E05.00 GRAVES' DISEASE: ICD-10-CM

## 2024-07-09 DIAGNOSIS — I10 ESSENTIAL HYPERTENSION: ICD-10-CM

## 2024-07-09 DIAGNOSIS — E11.65 TYPE 2 DIABETES MELLITUS WITH HYPERGLYCEMIA, WITHOUT LONG-TERM CURRENT USE OF INSULIN (HCC): ICD-10-CM

## 2024-07-09 LAB
AMBIGUOUS DTTM AMBI4: NORMAL
CREAT UR-MCNC: 99.31 MG/DL
HBA1C MFR BLD: 6.4 % (ref ?–5.8)
MICROALBUMIN UR-MCNC: <1.2 MG/DL
MICROALBUMIN/CREAT UR: NORMAL MG/G (ref 0–30)
POCT INT CON NEG: NEGATIVE
POCT INT CON POS: POSITIVE

## 2024-07-09 PROCEDURE — 83036 HEMOGLOBIN GLYCOSYLATED A1C: CPT

## 2024-07-09 PROCEDURE — 82043 UR ALBUMIN QUANTITATIVE: CPT

## 2024-07-09 PROCEDURE — 82570 ASSAY OF URINE CREATININE: CPT

## 2024-07-09 PROCEDURE — 3077F SYST BP >= 140 MM HG: CPT

## 2024-07-09 PROCEDURE — 99214 OFFICE O/P EST MOD 30 MIN: CPT

## 2024-07-09 PROCEDURE — 3078F DIAST BP <80 MM HG: CPT

## 2024-07-09 SDOH — SOCIAL STABILITY - SOCIAL INSECURITY: DISSAPEARANCE AND DEATH OF FAMILY MEMBER: Z63.4

## 2024-07-09 ASSESSMENT — ENCOUNTER SYMPTOMS
CHILLS: 0
SHORTNESS OF BREATH: 0
PALPITATIONS: 0
FEVER: 0
COUGH: 0
ORTHOPNEA: 0

## 2024-07-10 ENCOUNTER — HOSPITAL ENCOUNTER (OUTPATIENT)
Dept: LAB | Facility: MEDICAL CENTER | Age: 69
End: 2024-07-10
Payer: MEDICARE

## 2024-07-10 DIAGNOSIS — I10 ESSENTIAL HYPERTENSION: ICD-10-CM

## 2024-07-10 DIAGNOSIS — E05.00 GRAVES' DISEASE: ICD-10-CM

## 2024-07-10 DIAGNOSIS — E11.65 TYPE 2 DIABETES MELLITUS WITH HYPERGLYCEMIA, WITHOUT LONG-TERM CURRENT USE OF INSULIN (HCC): ICD-10-CM

## 2024-07-10 LAB
ALBUMIN SERPL BCP-MCNC: 4 G/DL (ref 3.2–4.9)
ALBUMIN/GLOB SERPL: 1.3 G/DL
ALP SERPL-CCNC: 96 U/L (ref 30–99)
ALT SERPL-CCNC: 12 U/L (ref 2–50)
ANION GAP SERPL CALC-SCNC: 9 MMOL/L (ref 7–16)
AST SERPL-CCNC: 18 U/L (ref 12–45)
BILIRUB SERPL-MCNC: 0.3 MG/DL (ref 0.1–1.5)
BUN SERPL-MCNC: 14 MG/DL (ref 8–22)
CALCIUM ALBUM COR SERPL-MCNC: 9.5 MG/DL (ref 8.5–10.5)
CALCIUM SERPL-MCNC: 9.5 MG/DL (ref 8.5–10.5)
CHLORIDE SERPL-SCNC: 103 MMOL/L (ref 96–112)
CO2 SERPL-SCNC: 26 MMOL/L (ref 20–33)
CREAT SERPL-MCNC: 0.85 MG/DL (ref 0.5–1.4)
ERYTHROCYTE [DISTWIDTH] IN BLOOD BY AUTOMATED COUNT: 42.8 FL (ref 35.9–50)
GFR SERPLBLD CREATININE-BSD FMLA CKD-EPI: 75 ML/MIN/1.73 M 2
GLOBULIN SER CALC-MCNC: 3.1 G/DL (ref 1.9–3.5)
GLUCOSE SERPL-MCNC: 79 MG/DL (ref 65–99)
HCT VFR BLD AUTO: 49.3 % (ref 37–47)
HGB BLD-MCNC: 16.8 G/DL (ref 12–16)
MCH RBC QN AUTO: 32.7 PG (ref 27–33)
MCHC RBC AUTO-ENTMCNC: 34.1 G/DL (ref 32.2–35.5)
MCV RBC AUTO: 95.9 FL (ref 81.4–97.8)
PLATELET # BLD AUTO: 225 K/UL (ref 164–446)
PMV BLD AUTO: 10.6 FL (ref 9–12.9)
POTASSIUM SERPL-SCNC: 4.4 MMOL/L (ref 3.6–5.5)
PROT SERPL-MCNC: 7.1 G/DL (ref 6–8.2)
RBC # BLD AUTO: 5.14 M/UL (ref 4.2–5.4)
SODIUM SERPL-SCNC: 138 MMOL/L (ref 135–145)
T3 SERPL-MCNC: 114 NG/DL (ref 60–181)
T4 FREE SERPL-MCNC: 1.03 NG/DL (ref 0.93–1.7)
TSH SERPL DL<=0.005 MIU/L-ACNC: 1.54 UIU/ML (ref 0.38–5.33)
WBC # BLD AUTO: 4.9 K/UL (ref 4.8–10.8)

## 2024-07-10 PROCEDURE — 85027 COMPLETE CBC AUTOMATED: CPT

## 2024-07-10 PROCEDURE — 36415 COLL VENOUS BLD VENIPUNCTURE: CPT

## 2024-07-10 PROCEDURE — 84439 ASSAY OF FREE THYROXINE: CPT

## 2024-07-10 PROCEDURE — 80053 COMPREHEN METABOLIC PANEL: CPT

## 2024-07-10 PROCEDURE — 84443 ASSAY THYROID STIM HORMONE: CPT

## 2024-07-10 PROCEDURE — 84480 ASSAY TRIIODOTHYRONINE (T3): CPT

## 2024-07-16 ENCOUNTER — APPOINTMENT (OUTPATIENT)
Dept: ENDOCRINOLOGY | Facility: MEDICAL CENTER | Age: 69
End: 2024-07-16
Attending: STUDENT IN AN ORGANIZED HEALTH CARE EDUCATION/TRAINING PROGRAM
Payer: MEDICARE

## 2024-08-27 DIAGNOSIS — R00.2 PALPITATIONS: ICD-10-CM

## 2024-08-27 DIAGNOSIS — E05.00 GRAVES' DISEASE: ICD-10-CM

## 2024-08-27 PROCEDURE — RXMED WILLOW AMBULATORY MEDICATION CHARGE

## 2024-08-27 NOTE — TELEPHONE ENCOUNTER
Received request via: Patient    Was the patient seen in the last year in this department? Yes    Does the patient have an active prescription (recently filled or refills available) for medication(s) requested? No    Pharmacy Name: [facility]    Does the patient have long term Plus and need 100-day supply? (This applies to ALL medications) Yes, quantity updated to 100 days

## 2024-08-28 RX ORDER — ATENOLOL 25 MG/1
25 TABLET ORAL DAILY
Qty: 100 TABLET | Refills: 3 | Status: SHIPPED | OUTPATIENT
Start: 2024-08-28

## 2024-09-09 ENCOUNTER — PHARMACY VISIT (OUTPATIENT)
Dept: PHARMACY | Facility: MEDICAL CENTER | Age: 69
End: 2024-09-09
Payer: COMMERCIAL

## 2024-10-09 PROCEDURE — RXMED WILLOW AMBULATORY MEDICATION CHARGE

## 2024-10-11 ENCOUNTER — PHARMACY VISIT (OUTPATIENT)
Dept: PHARMACY | Facility: MEDICAL CENTER | Age: 69
End: 2024-10-11
Payer: COMMERCIAL

## 2024-10-31 ENCOUNTER — APPOINTMENT (OUTPATIENT)
Dept: MEDICAL GROUP | Facility: MEDICAL CENTER | Age: 69
End: 2024-10-31
Payer: MEDICARE

## 2024-11-11 PROCEDURE — RXMED WILLOW AMBULATORY MEDICATION CHARGE

## 2024-11-13 ENCOUNTER — PHARMACY VISIT (OUTPATIENT)
Dept: PHARMACY | Facility: MEDICAL CENTER | Age: 69
End: 2024-11-13
Payer: COMMERCIAL

## 2024-11-13 PROCEDURE — RXMED WILLOW AMBULATORY MEDICATION CHARGE

## 2024-11-20 ENCOUNTER — APPOINTMENT (OUTPATIENT)
Dept: MEDICAL GROUP | Facility: MEDICAL CENTER | Age: 69
End: 2024-11-20
Payer: MEDICARE

## 2024-11-21 ENCOUNTER — PHARMACY VISIT (OUTPATIENT)
Dept: PHARMACY | Facility: MEDICAL CENTER | Age: 69
End: 2024-11-21
Payer: COMMERCIAL

## 2024-12-06 NOTE — MR AVS SNAPSHOT
"        Gia Guy Marleny   3/23/2017 8:20 AM   Office Visit   MRN: [id number]    Department:  [address] [place] [address]   Dept Phone:  [phone]    Description:  Female : 1955   Provider:  Kindra Gutierrez M.D.           Allergies as of 3/23/2017     Allergen Noted Reactions    Nkda [No Known Drug Allergy] 2009         You were diagnosed with     Essential hypertension   [1844808]       Seizures (CMS-[facility])   [758957]       Graves' disease   [613447]       Chronic use of opiate drugs therapeutic purposes   [3948342]         Vital Signs     Blood Pressure Pulse Temperature Respirations Height Weight    124/82 mmHg 105 37 °C (98.6 °F) 14 1.702 m (5' 7\") 77.565 kg (171 lb)    Body Mass Index Oxygen Saturation Smoking Status             26.78 kg/m2 95% Current Every Day Smoker         Basic Information     Date Of Birth Sex Race Ethnicity Preferred Language    1955 Female Black or  Non- English      Problem List              ICD-10-CM Priority Class Noted - Resolved    SOB (shortness of breath) on exertion R06.02   2009 - Present    Seizures (CMS-HCC) R56.9   Unknown - Present    Depression F32.9   Unknown - Present    Anxiety F41.9   Unknown - Present    Arrhythmia I49.9   Unknown - Present    Chronic headache (Chronic) R51   2010 - Present    Fibroid uterus D25.9   2011 - Present    Essential hypertension I10   4/15/2013 - Present    Diabetes mellitus type 2 in nonobese (CMS-HCC) E11.9   2013 - Present    Graves' disease E05.00   2014 - Present    Dyslipidemia E78.5   2014 - Present    Opioid type dependence, continuous (CMS-HCC) F11.20   2015 - Present      Health Maintenance        Date Due Completion Dates    IMM HEP B VACCINE (1 of 3 - Primary Series) 1955 ---    IMM DTaP/Tdap/Td Vaccine (1 - Tdap) 1976 ---    URINE ACR / MICROALBUMIN 2015 (Postponed)    Override on 2014: Postponed    COLONOSCOPY 2015 " [Verbal] : Verbal    IMM INFLUENZA (1) 9/1/2016 9/30/2014    DIABETES MONOFILAMENT / LE EXAM 1/14/2017 1/14/2016    A1C SCREENING 6/19/2017 12/19/2016, 10/5/2016, 6/20/2016, 6/2/2015, 3/13/2015, 9/17/2013, 5/17/2007    RETINAL SCREENING 12/19/2017 12/19/2016, 3/31/2016    FASTING LIPID PROFILE 1/24/2018 1/24/2017, 6/20/2016, 3/13/2015, 9/8/2014, 9/17/2013, 12/16/2010, 11/2/2009, 1/27/2009, 1/31/2008    SERUM CREATININE 1/24/2018 1/24/2017, 6/20/2016, 6/2/2015, 9/8/2014, 3/21/2013, 10/12/2012, 4/1/2011, 12/16/2010, 11/2/2009, 1/27/2009, 4/22/2008, 1/31/2008, 5/17/2007, 2/12/2007, 9/18/2006, 8/25/2005, 3/24/2005    MAMMOGRAM 2/13/2018 2/13/2017, 1/30/2015, 8/1/2014 (Postponed), 4/26/2013, 12/16/2010    Override on 8/1/2014: Postponed    PAP SMEAR 2/19/2018 2/19/2015, 2/19/2015, 7/9/2013, 6/20/2011            Current Immunizations     Influenza TIV (IM) 9/30/2014  9:00 AM    Pneumococcal polysaccharide vaccine (PPSV-23) 9/30/2014  9:02 AM      Below and/or attached are the medications your provider expects you to take. Review all of your home medications and newly ordered medications with your provider and/or pharmacist. Follow medication instructions as directed by your provider and/or pharmacist. Please keep your medication list with you and share with your provider. Update the information when medications are discontinued, doses are changed, or new medications (including over-the-counter products) are added; and carry medication information at all times in the event of emergency situations     Allergies:  NKDA - (reactions not documented)               Medications  Valid as of: March 23, 2017 -  8:45 AM    Generic Name Brand Name Tablet Size Instructions for use    Albuterol Sulfate (Aero Soln) albuterol 108 (90 BASE) MCG/ACT Inhale 2 Puffs by mouth every four hours as needed for Shortness of Breath.        Aspirin (Tablet Delayed Response) aspirin 81 MG Take  by mouth. qd         Metoprolol Tartrate (Tab) LOPRESSOR 50 MG Take 1 Tab  [Demo] : Demo [Patient] : Patient by mouth 2 times a day.        Nitroglycerin (SL Tab) NITROSTAT 0.4 MG Place 1 Tab under tongue as needed for Chest Pain.        OxyCODONE HCl (Tab) ROXICODONE 5 MG Take 1 Tab by mouth 2 times a day as needed.        Phenytoin Sodium Extended (Cap) DILANTIN 100 MG Take 1 Cap by mouth every day.        Propylthiouracil (Tab) PTU 50 MG Take 2 Tabs by mouth 2 times a day.        Simvastatin (Tab) ZOCOR 20 MG Take 1 Tab by mouth every evening.        .                 Medicines prescribed today were sent to:     [facility] [address] [place] [address], [place] - [address] [place] [address]    Phone: [phone] Fax: [phone]    Open 24 Hours?: No      Medication refill instructions:       If your prescription bottle indicates you have medication refills left, it is not necessary to call your provider’s office. Please contact your pharmacy and they will refill your medication.    If your prescription bottle indicates you do not have any refills left, you may request refills at any time through one of the following ways: The online [employer] system (except Urgent Care), by calling your provider’s office, or by asking your pharmacy to contact your provider’s office with a refill request. Medication refills are processed only during regular business hours and may not be available until the next business day. Your provider may request additional information or to have a follow-up visit with you prior to refilling your medication.   *Please Note: Medication refills are assigned a new Rx number when refilled electronically. Your pharmacy may indicate that no refills were authorized even though a new prescription for the same medication is available at the pharmacy. Please request the medicine by name with the pharmacy before contacting your provider for a refill.        Referral     A referral request has been sent to our patient care coordination department. Please allow 3-5 business days for us to process this request  [Good - alert, interested, motivated] : Good - alert, interested, motivated and contact you either by phone or mail. If you do not hear from us by the 5th business day, please call us at [phone].        Other Notes About Your Plan     [facility]----depression/ anxiety  Dr Dobson----endocrinologist  Dr Melissa Bloch--neurology  [facility]           [employer] Access Code: [email or web address]  Expires: 3/25/2017  1:14 PM    [employer]  A secure, online tool to manage your health information     [employer]s [employer]® is a secure, online tool that connects you to your personalized health information from the privacy of your home -- day or night - making it very easy for you to manage your healthcare. Once the activation process is completed, you can even access your medical information using the [employer] lori, which is available for free in the Apple Lori store or Google Play store.     [employer] provides the following levels of access (as shown below):   My Chart Features   [facility] Primary Care Doctor [facility]  Specialists [facility]  Urgent  Care Non-[facility]  Primary Care  Doctor   Email your healthcare team securely and privately 24/7 X X X    Manage appointments: schedule your next appointment; view details of past/upcoming appointments X      Request prescription refills. X      View recent personal medical records, including lab and immunizations X X X X   View health record, including health history, allergies, medications X X X X   Read reports about your outpatient visits, procedures, consult and ER notes X X X X   See your discharge summary, which is a recap of your hospital and/or ER visit that includes your diagnosis, lab results, and care plan. X X       How to register for [employer]:  1. Go to  [email or web address].  2. Click on the Sign Up Now box, which takes you to the New Member Sign Up page. You will need to provide the following information:  a. Enter your [employer] Access Code exactly as it appears at the top of this page. (You will not need to use this code after you’ve completed the  [Verbalizes knowledge/Understanding] : Verbalizes knowledge/understanding sign-up process. If you do not sign up before the expiration date, you must request a new code.)   b. Enter your date of birth.   c. Enter your home email address.   d. Click Submit, and follow the next screen’s instructions.  3. Create a eIQnetworks ID. This will be your e[employer]networks login ID and cannot be changed, so think of one that is secure and easy to remember.  4. Create a [employer] password. You can change your password at any time.  5. Enter your Password Reset Question and Answer. This can be used at a later time if you forget your password.   6. Enter your e-mail address. This allows you to receive e-mail notifications when new information is available in e[employer]networks.  7. Click Sign Up. You can now view your health information.    For assistance activating your [employer] account, call [phone]        Quit Tobacco Information     Do you want to quit using tobacco?    Quitting tobacco decreases risks of cancer, heart and lung disease, increases life expectancy, improves sense of taste and smell, and increases spending money, among other benefits.    If you are thinking about quitting, we can help.  • [facility] Quit Tobacco Program: [phone]  o Program occurs weekly for four weeks and includes pharmacist consultation on products to support quitting smoking or chewing tobacco. A provider referral is needed for pharmacist consultation.  • Tobacco Users Help Hotline: 800QUIT-NOW (284-1944) or https://[place].quitlogix.org/  o Free, confidential telephone and online coaching for [place] residents. Sessions are designed on a schedule that is convenient for you. Eligible clients receive free nicotine replacement therapy.  • Nationally: www.smokefree.gov  o Information and professional assistance to support both immediate and long-term needs as you become, and remain, a non-smoker. Smokefree.gov allows you to choose the help that best fits your needs.         [Foot Care] : foot care [Skin Care] : skin care [Signs and symptoms of infection] : sign and symptoms of infection [Nutrition] : nutrition [How and When to Call] : how and when to call [Pain Management] : pain management [Patient responsibility to plan of care] : patient responsibility to plan of care [Stable] : stable [Home] : Home [Not Applicable - Long Term Care/Home Health Agency] : Long Term Care/Home Health Agency: Not Applicable [Spouse] : Spouse [Pressure relief] : pressure relief [Ambulatory] : Ambulatory [] : No [FreeTextEntry2] : Infection Prevention Promote and Restore optimal skin integrity Nutrition and Wound Healing  Offloading and Pressure relief Protect and Guard wound site  Maintain acceptable levels of Pain Compliance R/T Elevation of Lower Extremities [FreeTextEntry3] : c/o pain medial aspect of mid-foot [FreeTextEntry4] : DPM assessed wound site and discussed pain relief / support of foot Pt. recommended to purchase inserts "Super Feet" or "Power Step" Pt. to have Xray prior to next visit.  Requisition given for same. Patient verbalized understanding of all discussed. Patient to return to [facility] in Two to Four Weeks

## 2025-01-28 PROCEDURE — RXMED WILLOW AMBULATORY MEDICATION CHARGE

## 2025-01-29 ENCOUNTER — PHARMACY VISIT (OUTPATIENT)
Dept: PHARMACY | Facility: MEDICAL CENTER | Age: 70
End: 2025-01-29
Payer: COMMERCIAL

## 2025-02-14 ENCOUNTER — TELEPHONE (OUTPATIENT)
Dept: HEALTH INFORMATION MANAGEMENT | Facility: OTHER | Age: 70
End: 2025-02-14
Payer: MEDICARE

## 2025-02-14 NOTE — TELEPHONE ENCOUNTER
Phone Number Called: There are no phone numbers on file.      Call outcome: Spoke to patient regarding message below.    Message: I called member back to provide her with information on the [employer] [facility], Located at [address] across from the hospital. Open on Thursdays from 11:00 to 3:00 pm, phone [phone].

## 2025-02-19 ENCOUNTER — RESULTS FOLLOW-UP (OUTPATIENT)
Dept: MEDICAL GROUP | Facility: MEDICAL CENTER | Age: 70
End: 2025-02-19

## 2025-02-19 ENCOUNTER — APPOINTMENT (OUTPATIENT)
Dept: MEDICAL GROUP | Facility: MEDICAL CENTER | Age: 70
End: 2025-02-19
Payer: MEDICARE

## 2025-02-19 ENCOUNTER — HOSPITAL ENCOUNTER (OUTPATIENT)
Facility: MEDICAL CENTER | Age: 70
End: 2025-02-19
Payer: MEDICARE

## 2025-02-19 ENCOUNTER — HOSPITAL ENCOUNTER (OUTPATIENT)
Dept: LAB | Facility: MEDICAL CENTER | Age: 70
End: 2025-02-19
Payer: MEDICARE

## 2025-02-19 VITALS
HEIGHT: 67 IN | BODY MASS INDEX: 18.87 KG/M2 | OXYGEN SATURATION: 98 % | DIASTOLIC BLOOD PRESSURE: 64 MMHG | WEIGHT: 120.2 LBS | TEMPERATURE: 97.6 F | HEART RATE: 55 BPM | SYSTOLIC BLOOD PRESSURE: 120 MMHG

## 2025-02-19 DIAGNOSIS — Z79.4 TYPE 2 DIABETES MELLITUS WITH HYPERGLYCEMIA, WITH LONG-TERM CURRENT USE OF INSULIN (HCC): ICD-10-CM

## 2025-02-19 DIAGNOSIS — Z12.31 ENCOUNTER FOR SCREENING MAMMOGRAM FOR BREAST CANCER: ICD-10-CM

## 2025-02-19 DIAGNOSIS — R35.0 FREQUENT URINATION: ICD-10-CM

## 2025-02-19 DIAGNOSIS — Z78.0 MENOPAUSE: ICD-10-CM

## 2025-02-19 DIAGNOSIS — E78.5 DYSLIPIDEMIA: ICD-10-CM

## 2025-02-19 DIAGNOSIS — R63.4 WEIGHT LOSS: ICD-10-CM

## 2025-02-19 DIAGNOSIS — E05.00 GRAVES' DISEASE: ICD-10-CM

## 2025-02-19 DIAGNOSIS — I10 ESSENTIAL HYPERTENSION: ICD-10-CM

## 2025-02-19 DIAGNOSIS — R00.2 PALPITATIONS: ICD-10-CM

## 2025-02-19 DIAGNOSIS — E11.65 TYPE 2 DIABETES MELLITUS WITH HYPERGLYCEMIA, WITH LONG-TERM CURRENT USE OF INSULIN (HCC): ICD-10-CM

## 2025-02-19 DIAGNOSIS — R56.9 SEIZURES (HCC): ICD-10-CM

## 2025-02-19 PROBLEM — E11.9 DIABETES MELLITUS WITH COINCIDENT HYPERTENSION (HCC): Status: RESOLVED | Noted: 2024-05-13 | Resolved: 2025-02-19

## 2025-02-19 PROBLEM — E11.69 TYPE 2 DIABETES MELLITUS WITH HYPERLIPIDEMIA (HCC): Status: RESOLVED | Noted: 2024-05-13 | Resolved: 2025-02-19

## 2025-02-19 LAB
ALBUMIN SERPL BCP-MCNC: 3.8 G/DL (ref 3.2–4.9)
ALBUMIN/GLOB SERPL: 1.2 G/DL
ALP SERPL-CCNC: 89 U/L (ref 30–99)
ALT SERPL-CCNC: 10 U/L (ref 2–50)
ANION GAP SERPL CALC-SCNC: 10 MMOL/L (ref 7–16)
APPEARANCE UR: NORMAL
AST SERPL-CCNC: 18 U/L (ref 12–45)
BILIRUB SERPL-MCNC: 0.3 MG/DL (ref 0.1–1.5)
BILIRUB UR STRIP-MCNC: NORMAL MG/DL
BUN SERPL-MCNC: 16 MG/DL (ref 8–22)
CALCIUM ALBUM COR SERPL-MCNC: 9.7 MG/DL (ref 8.5–10.5)
CALCIUM SERPL-MCNC: 9.5 MG/DL (ref 8.5–10.5)
CHLORIDE SERPL-SCNC: 103 MMOL/L (ref 96–112)
CHOLEST SERPL-MCNC: 153 MG/DL (ref 100–199)
CO2 SERPL-SCNC: 29 MMOL/L (ref 20–33)
COLOR UR AUTO: NORMAL
CREAT SERPL-MCNC: 1.04 MG/DL (ref 0.5–1.4)
ERYTHROCYTE [DISTWIDTH] IN BLOOD BY AUTOMATED COUNT: 39.8 FL (ref 35.9–50)
GFR SERPLBLD CREATININE-BSD FMLA CKD-EPI: 59 ML/MIN/1.73 M 2
GLOBULIN SER CALC-MCNC: 3.2 G/DL (ref 1.9–3.5)
GLUCOSE SERPL-MCNC: 105 MG/DL (ref 65–99)
GLUCOSE UR STRIP.AUTO-MCNC: NORMAL MG/DL
HBA1C MFR BLD: 7.5 % (ref ?–5.8)
HCT VFR BLD AUTO: 49.3 % (ref 37–47)
HDLC SERPL-MCNC: 63 MG/DL
HGB BLD-MCNC: 16.7 G/DL (ref 12–16)
KETONES UR STRIP.AUTO-MCNC: NORMAL MG/DL
LDLC SERPL CALC-MCNC: 73 MG/DL
LEUKOCYTE ESTERASE UR QL STRIP.AUTO: NORMAL
MCH RBC QN AUTO: 31.2 PG (ref 27–33)
MCHC RBC AUTO-ENTMCNC: 33.9 G/DL (ref 32.2–35.5)
MCV RBC AUTO: 92.1 FL (ref 81.4–97.8)
NITRITE UR QL STRIP.AUTO: NORMAL
PH UR STRIP.AUTO: 6 [PH] (ref 5–8)
PLATELET # BLD AUTO: 234 K/UL (ref 164–446)
PMV BLD AUTO: 10.5 FL (ref 9–12.9)
POCT INT CON NEG: NEGATIVE
POCT INT CON POS: POSITIVE
POTASSIUM SERPL-SCNC: 3.7 MMOL/L (ref 3.6–5.5)
PROT SERPL-MCNC: 7 G/DL (ref 6–8.2)
PROT UR QL STRIP: NORMAL MG/DL
RBC # BLD AUTO: 5.35 M/UL (ref 4.2–5.4)
RBC UR QL AUTO: NORMAL
SODIUM SERPL-SCNC: 142 MMOL/L (ref 135–145)
SP GR UR STRIP.AUTO: 1.02
T3 SERPL-MCNC: 134 NG/DL (ref 60–181)
T4 FREE SERPL-MCNC: 1.36 NG/DL (ref 0.93–1.7)
TRIGL SERPL-MCNC: 86 MG/DL (ref 0–149)
TSH SERPL DL<=0.005 MIU/L-ACNC: <0.005 UIU/ML (ref 0.38–5.33)
UROBILINOGEN UR STRIP-MCNC: 0.2 MG/DL
WBC # BLD AUTO: 5.9 K/UL (ref 4.8–10.8)

## 2025-02-19 PROCEDURE — 80061 LIPID PANEL: CPT

## 2025-02-19 PROCEDURE — 84480 ASSAY TRIIODOTHYRONINE (T3): CPT

## 2025-02-19 PROCEDURE — 99214 OFFICE O/P EST MOD 30 MIN: CPT

## 2025-02-19 PROCEDURE — 81002 URINALYSIS NONAUTO W/O SCOPE: CPT

## 2025-02-19 PROCEDURE — 84439 ASSAY OF FREE THYROXINE: CPT

## 2025-02-19 PROCEDURE — 84443 ASSAY THYROID STIM HORMONE: CPT

## 2025-02-19 PROCEDURE — 36415 COLL VENOUS BLD VENIPUNCTURE: CPT

## 2025-02-19 PROCEDURE — RXMED WILLOW AMBULATORY MEDICATION CHARGE

## 2025-02-19 PROCEDURE — 87086 URINE CULTURE/COLONY COUNT: CPT

## 2025-02-19 PROCEDURE — 85027 COMPLETE CBC AUTOMATED: CPT

## 2025-02-19 PROCEDURE — 83036 HEMOGLOBIN GLYCOSYLATED A1C: CPT

## 2025-02-19 PROCEDURE — 3078F DIAST BP <80 MM HG: CPT

## 2025-02-19 PROCEDURE — 80053 COMPREHEN METABOLIC PANEL: CPT

## 2025-02-19 PROCEDURE — 3074F SYST BP LT 130 MM HG: CPT

## 2025-02-19 RX ORDER — LISINOPRIL AND HYDROCHLOROTHIAZIDE 10; 12.5 MG/1; MG/1
1 TABLET ORAL DAILY
Qty: 90 TABLET | Refills: 3 | Status: SHIPPED | OUTPATIENT
Start: 2025-02-19

## 2025-02-19 RX ORDER — NITROFURANTOIN 25; 75 MG/1; MG/1
100 CAPSULE ORAL EVERY 12 HOURS
Qty: 10 CAPSULE | Refills: 0 | Status: SHIPPED | OUTPATIENT
Start: 2025-02-19 | End: 2025-03-02

## 2025-02-19 RX ORDER — ATENOLOL 25 MG/1
25 TABLET ORAL DAILY
Qty: 100 TABLET | Refills: 3 | Status: SHIPPED | OUTPATIENT
Start: 2025-02-19

## 2025-02-19 RX ORDER — METHIMAZOLE 10 MG/1
10 TABLET ORAL 2 TIMES DAILY
Qty: 180 TABLET | Refills: 3 | Status: SHIPPED | OUTPATIENT
Start: 2025-02-19

## 2025-02-19 ASSESSMENT — ENCOUNTER SYMPTOMS
COUGH: 0
CHILLS: 0
PALPITATIONS: 0
ORTHOPNEA: 0
FEVER: 0
SHORTNESS OF BREATH: 0

## 2025-02-19 ASSESSMENT — FIBROSIS 4 INDEX: FIB4 SCORE: 1.55

## 2025-02-19 ASSESSMENT — PATIENT HEALTH QUESTIONNAIRE - PHQ9: CLINICAL INTERPRETATION OF PHQ2 SCORE: 0

## 2025-02-19 NOTE — PROGRESS NOTES
Subjective:     Verbal consent was acquired by the patient to use [employer] ambient listening note generation during this visit Yes    CC: Urinary Frequency (Pressure with urination x 2 months )      HPI:   Gia is a 69 y.o. female who presents today for:    History of Present Illness  The patient presents for an annual wellness visit.    She reports experiencing weight fluctuations, which she attributes to her thyroid condition. She has not yet consulted with an endocrinologist but plans to do so now that her colonoscopy is done. She does not experience fatigue associated with weight loss. She experienced a brief period of depression lasting about a week, which she believes was related to her thyroid condition. She has been adhering to her medication regimen and requires a refill of methimazole.she denies palpitations.     She suspects a recurrent urinary tract infection (UTI) due to the presence of glucose in her urine. She has been experiencing intermittent frequent urination for the past 2 months but reports no associated odor or burning sensation. She experiences abdominal pressure during urination but has not had any fever, chills, or low back pain.    She has not been monitoring her blood glucose levels recently but notes that she experiences shakiness in the mornings if she administers insulin the previous night. She has been managing her diabetes based on her body's signals. She has been taking insulin and received a new pack last week. She takes pioglitazone as needed when her blood glucose levels are elevated, as concurrent use with insulin causes hypoglycemia. Her A1C today was 7.5.    She reports feeling overheated upon waking up in the morning, a symptom that has been present for about a month. She does not believe this is a hot flash, as she is familiar with those. She does not experience palpitations and notes an improvement in her blood pressure.    She has not had any recent seizures, with the  last episode occurring in her 20s.              Allergies: Patient has no known allergies.     Medications:   Current Outpatient Medications:     atenolol (TENORMIN) 25 MG Tab, Take 1 tablet by mouth every day., Disp: 100 Tablet, Rfl: 3    lisinopril-hydrochlorothiazide (PRINZIDE) 10-12.5 MG per tablet, Take 1 Tablet by mouth every day., Disp: 90 Tablet, Rfl: 3    methimazole (TAPAZOLE) 10 MG Tab, Take 1 tablet by mouth 2 times a day., Disp: 180 Tablet, Rfl: 3    nitrofurantoin (MACROBID) 100 MG Cap, Take 1 Capsule by mouth every 12 hours for 5 days., Disp: 10 Capsule, Rfl: 0    insulin glargine (LANTUS SOLOSTAR) 100 UNIT/ML Solution Pen-injector injection, If morning fasting blood sugar is over 160 inject 14 units subcutaneously once daily.  If fasting blood sugar is between 120-159 inject 12 unit subcutaneously once daily. If fasting blood sugar is below 120 inject 10 units subcutaneously once daily., Disp: 15 mL, Rfl: 5    simvastatin (ZOCOR) 20 MG Tab, Take 1 Tablet by mouth every evening., Disp: 90 Tablet, Rfl: 3    pioglitazone (ACTOS) 30 MG Tab, Take 30 mg by mouth every day. Takes PRN now, Disp: , Rfl:     Misc. Devices Misc, One glucerna daily, Disp: 90 Each, Rfl: 3    Blood Glucose Monitoring Suppl (TRUE METRIX METER) w/Device Kit, Test blood sugar as recommended by provider.  Dx E 11.65, Disp: 1 Kit, Rfl: PRN    glucose blood (TRUE METRIX BLOOD GLUCOSE TEST) strip, For test strips: Use 3x/day and as needed for signs and symptoms of high or low blood sugar. Dx E 11.65, Disp: 300 Strip, Rfl: 11    Lancets, Use 3 times daily and as needed for signs and symptoms of high or low blood sugar. Dx E 11.65, Disp: 100 Each, Rfl: 3    albuterol (PROVENTIL HFA) 108 (90 Base) MCG/ACT Aero Soln inhalation aerosol, Inhale 2 Puffs every four hours as needed for Shortness of Breath., Disp: 2 g, Rfl: 3      ROS:  Review of Systems   Constitutional:  Negative for chills and fever.   Respiratory:  Negative for cough and  "shortness of breath.    Cardiovascular:  Negative for chest pain, palpitations, orthopnea and leg swelling.       Objective:     Exam:  /64   Pulse (!) 55   Temp 36.4 °C (97.6 °F) (Temporal)   Ht 1.702 m (5' 7\")   Wt 54.5 kg (120 lb 3.2 oz)   SpO2 98%   BMI 18.83 kg/m²  Body mass index is 18.83 kg/m².    Physical Exam  Constitutional:       Appearance: Normal appearance.   Eyes:      Pupils: Pupils are equal, round, and reactive to light.   Cardiovascular:      Rate and Rhythm: Normal rate and regular rhythm.      Pulses: Normal pulses.      Heart sounds: Normal heart sounds.   Pulmonary:      Effort: Pulmonary effort is normal.      Breath sounds: Normal breath sounds.   Abdominal:      General: Bowel sounds are normal.      Palpations: Abdomen is soft.   Neurological:      Mental Status: She is alert and oriented to person, place, and time.   Psychiatric:         Mood and Affect: Mood normal.         Behavior: Behavior normal.           Assessment & Plan:     Gia blevins 69 y.o. female with the following -     Assessment & Plan      1. Frequent urination  Acute, uncomplicated  She reports symptoms suggestive of a UTI, including frequent urination and pressure in the abdomen. A urine sample was collected and was positive for leukocytes.  Will send antibiotics and send urine for culture.  - POCT Urinalysis  -Urine culture    2. Graves' disease  Chronic, unstable  She reports weight loss of 10 lbs we discussed the importance of establishing with an endocrinologist given her ongoing weight loss.  Atenolol and methimazole refilled.  Patient due to recheck labs, instruct patient to complete them this morning after her visit.  Referral to chronic care management to help with establishing appointment with endocrinology.  - atenolol (TENORMIN) 25 MG Tab; Take 1 tablet by mouth every day.  Dispense: 100 Tablet; Refill: 3  - methimazole (TAPAZOLE) 10 MG Tab; Take 1 tablet by mouth 2 times a day.  Dispense: 180 " Tablet; Refill: 3  - TSH WITH REFLEX TO FT4; Future  - FREE THYROXINE; Future  - TRIIDOTHYRONINE; Future  - Referral to Endocrinology  - REFERRAL TO CARE MANAGEMENT    3. Palpitations  Chronic, stable  Her blood pressure has improved. She is currently taking atenolol. Her atenolol prescription has been refilled.  - atenolol (TENORMIN) 25 MG Tab; Take 1 tablet by mouth every day.  Dispense: 100 Tablet; Refill: 3    4. Type 2 diabetes mellitus with hyperglycemia, with long-term current use of insulin (HCC)  Chronic, unstable  She has not been regularly checking her blood sugar levels.  Her A1c increased to 7.5.  She is advised to start monitoring her blood sugar levels more frequently. She is currently taking insulin and pioglitazone as needed. A referral to the nursing team has been made to assist her with using her glucometer.  - POCT Hemoglobin A1C  - Comp Metabolic Panel; Future  - CBC WITHOUT DIFFERENTIAL; Future  - REFERRAL TO CARE MANAGEMENT  - Referral to Endocrinology    5. Weight loss  6. Body mass index (BMI) 19.9 or less, adult  Acute, complicated  She has experienced a weight loss of approximately 10 pounds since her last visit in May 2024. A comprehensive blood work will be ordered to assess her thyroid levels. A referral to endocrinology has been made for further evaluation. Likely due to her graves disease.  - REFERRAL TO CARE MANAGEMENT    7. Seizures (HCC)  Chronic, stable  She has not had any recent seizures, with the last episode occurring in her 20s.    8. Dyslipidemia  Chronic, stable  Recommend continue to work on diet and exercise to help with cholesterol management.  Continue simvastatin 20 mg nightly.  - Lipid Profile; Future  - simvastatin (ZOCOR) 20 MG Tab, Take 1 Tablet by mouth every evening., Disp: 90 Tablet, Rfl: 3    9. Essential hypertension  Chronic, stable  Blood pressure well-controlled today, 120/64.  Continue Prinzide 10-12.5 mg daily.  - lisinopril-hydrochlorothiazide (PRINZIDE)  10-12.5 MG per tablet; Take 1 Tablet by mouth every day.  Dispense: 90 Tablet; Refill: 3    10. Menopause  Chronic, stable  Due for dexa scan.   - DS-BONE DENSITY STUDY (DEXA); Future    11. Encounter for screening mammogram for breast cancer  - MA-SCREENING MAMMO BILAT W/TOMOSYNTHESIS W/CAD; Future        Anticipatory guidance included the following: Patient counseled about skin care, diet, supplements, smoking, drugs/alcohol use, safe sex and exercise.     Return in about 4 weeks (around 3/19/2025) for Annual wellness visit.    Please note that this dictation was created using voice recognition software. I have made every reasonable attempt to correct obvious errors, but I expect that there are errors of grammar and possibly content that I did not discover before finalizing the note.      I have placed the below orders and discussed them with an approved delegating provider.  The MA is performing the below orders under the direction of Dr. Horner.

## 2025-02-19 NOTE — PROGRESS NOTES
Chief Complaint   Patient presents with    Annual Exam       HPI:  Gia Farmer is a 69 y.o. here for Medicare Annual Wellness Visit     Patient Active Problem List    Diagnosis Date Noted    Diabetes mellitus with coincident hypertension (HCC) 05/13/2024    Type 2 diabetes mellitus with hyperlipidemia ([facility]) 05/13/2024    Encounter for osteoporosis screening in asymptomatic postmenopausal patient 05/13/2024    History of seizure 03/18/2024    History of colon cancer 03/18/2024    Body mass index (BMI) 19.9 or less, adult 03/18/2024    Type 2 diabetes mellitus with hyperglycemia, with long-term current use of insulin ([facility]) 10/23/2017    Tobacco use 04/21/2017    Type 2 diabetes mellitus with hyperglycemia, without long-term current use of insulin ([facility]) 04/21/2017    Dyslipidemia 09/30/2014    Graves' disease 01/09/2014    Essential hypertension 04/15/2013    Seizures ([facility])        Current Outpatient Medications   Medication Sig Dispense Refill    atenolol (TENORMIN) 25 MG Tab Take 1 tablet by mouth every day. 100 Tablet 3    lisinopril-hydrochlorothiazide (PRINZIDE) 10-12.5 MG per tablet Take 1 Tablet by mouth every day. 90 Tablet 3    insulin glargine (LANTUS SOLOSTAR) 100 UNIT/ML Solution Pen-injector injection If morning fasting blood sugar is over 160 inject 14 units subcutaneously once daily.  If fasting blood sugar is between 120-159 inject 12 unit subcutaneously once daily. If fasting blood sugar is below 120 inject 10 units subcutaneously once daily. 15 mL 5    simvastatin (ZOCOR) 20 MG Tab Take 1 Tablet by mouth every evening. 90 Tablet 3    pioglitazone (ACTOS) 30 MG Tab Take 30 mg by mouth every day. Takes PRN now      methimazole (TAPAZOLE) 10 MG Tab Take 1 tablet by mouth 2 times a day. 180 Tablet 3    Misc. Devices Misc One glucerna daily 90 Each 3    Blood Glucose Monitoring Suppl (TRUE METRIX METER) w/Device Kit Test blood sugar as recommended by provider.  Dx E 11.65 1 Kit PRN    glucose blood  (TRUE METRIX BLOOD GLUCOSE TEST) strip For test strips: Use 3x/day and as needed for signs and symptoms of high or low blood sugar. Dx E 11.65 300 Strip 11    Lancets Use 3 times daily and as needed for signs and symptoms of high or low blood sugar. Dx E 11.65 100 Each 3    albuterol (PROVENTIL HFA) 108 (90 Base) MCG/ACT Aero Soln inhalation aerosol Inhale 2 Puffs every four hours as needed for Shortness of Breath. 2 g 3     No current facility-administered medications for this visit.          Current supplements as per medication list.     Allergies: Patient has no known allergies.    Current social contact/activities: ***     She  reports that she has been smoking cigarettes. She started smoking about 28 years ago. She has a 7 pack-year smoking history. She has never used smokeless tobacco. She reports that she does not currently use alcohol. She reports that she does not use drugs.  Ready to quit: Not Answered  Counseling given: Not Answered  Tobacco comments: started at 42/3 cigs/day      ROS:    Gait: Uses no assistive device  Ostomy: No  Other tubes: No  Amputations: No  Chronic oxygen use: No  Last eye exam: 2024  Wears hearing aids: No   : Denies any urinary leakage during the last 6 months    Screening:    Depression Screening  Little interest or pleasure in doing things?  0 - not at all  Feeling down, depressed , or hopeless? 0 - not at all  Trouble falling or staying asleep, or sleeping too much?     Feeling tired or having little energy?     Poor appetite or overeating?     Feeling bad about yourself - or that you are a failure or have let yourself or your family down?    Trouble concentrating on things, such as reading the newspaper or watching television?    Moving or speaking so slowly that other people could have noticed.  Or the opposite - being so fidgety or restless that you have been moving around a lot more than usual?     Thoughts that you would be better off dead, or of hurting yourself?      Patient Health Questionnaire Score:      If depressive symptoms identified deferred to follow up visit unless specifically addressed in assessment and plan.    Interpretation of PHQ-9 Total Score   Score Severity   1-4 No Depression   5-9 Mild Depression   10-14 Moderate Depression   15-19 Moderately Severe Depression   20-27 Severe Depression    Screening for Cognitive Impairment  Do you or any of your friends or family members have any concern about your memory?    Three Minute Recall (Village, Kitchen, Baby)  /3    Vineet clock face with all 12 numbers and set the hands to show 10 minutes past 11.       Cognitive concerns identified deferred for follow up unless specifically addressed in assessment and plan.    Fall Risk Assessment  Has the patient had two or more falls in the last year or any fall with injury in the last year?  No    Safety Assessment  Do you always wear your seatbelt?     Any changes to home needed to function safely?    Difficulty hearing.     Patient counseled about all safety risks that were identified.    Functional Assessment ADLs  Are there any barriers preventing you from cooking for yourself or meeting nutritional needs?   .    Are there any barriers preventing you from driving safely or obtaining transportation?   .    Are there any barriers preventing you from using a telephone or calling for help?       Are there any barriers preventing you from shopping?   .    Are there any barriers preventing you from taking care of your own finances?       Are there any barriers preventing you from managing your medications?       Are there any barriers preventing you from showering, bathing or dressing yourself?      Are there any barriers preventing you from doing housework or laundry?      Are there any barriers preventing you from using the toilet?     Are you currently engaging in any exercise or physical activity?   .      Self-Assessment of Health  What is your perception of your health?       Do you sleep more than six hours a night?      In the past 7 days, how much did pain keep you from doing your normal work?      Do you spend quality time with family or friends (virtually or in person)?      Do you usually eat a heart healthy diet that constists of a variety of fruits, vegetables, whole grains and fiber?      Do you eat foods high in fat and/or Fast Food more than three times per week?      How concerned are you that your medical conditions are not being well managed?      Are you worried that in the next 2 months, you may not have stable housing that you own, rent, or stay in as part of a household?          Advance Care Planning  Do you have an Advance Directive, Living Will, Durable Power of , or POLST?                   Health Maintenance Summary            Current Care Gaps       Bone Density Scan (Every 5 Years) Overdue since 6/2/2014 05/13/2024  Order placed for DS-BONE DENSITY STUDY (DEXA) by ALON Swift    06/02/2009  DS-BONE DENSITY STUDY (DEXA)              Pneumococcal Vaccine: 50+ Years (2 of 2 - PCV) Overdue since 9/30/2015 09/30/2014  Imm Admin: Pneumococcal polysaccharide vaccine (PPSV-23)              Fasting Lipid Profile (Yearly) Overdue since 5/19/2024 05/19/2023  Lipid Profile    01/07/2022  Lipid Profile    09/16/2020  Lipid Profile    06/27/2019  Lipid Profile    01/24/2018  LIPID PROFILE     Only the first 5 history entries have been loaded, but more history exists.            Diabetes: Retinopathy Screening (Yearly) Overdue since 6/22/2024 06/22/2023  POCT Retinal Eye Exam    01/28/2022  AMB EXTERNAL RETINAL SCREENING RESULTS    01/28/2022  REFERRAL FOR RETINAL SCREENING EXAM    12/19/2016  POCT Retinal Eye Exam    03/31/2016  AMB REFERRAL FOR RETINAL SCREENING EXAM      Only the first 5 history entries have been loaded, but more history exists.              Influenza Vaccine (1) Overdue since 9/1/2024 09/30/2014  Imm Admin:  Influenza Seasonal Injectable - Historical Data    09/30/2014  Imm Admin: INFLUENZA TIV (IM)              COVID-19 Vaccine (2 - 2024-25 season) Overdue since 9/1/2024      04/10/2021  Imm Admin: [place] SARS-CoV-2 Vaccine              Mammogram (Yearly) Overdue since 10/18/2024      10/18/2023  MA-SCREENING MAMMO BILAT W/TOMOSYNTHESIS W/CAD    10/11/2022  MA-SCREENING MAMMO BILAT W/TOMOSYNTHESIS W/CAD    08/20/2021  MA-SCREENING MAMMO BILAT W/TOMOSYNTHESIS W/CAD    11/13/2019  MA-SCREENING MAMMO BILAT W/TOMOSYNTHESIS W/CAD    05/11/2018  MA-MAMMO SCREENING BILAT W/CORI W/CAD      Only the first 5 history entries have been loaded, but more history exists.              Zoster (Shingles) Vaccines (1 of 2) Never done      No completion history exists for this topic.                      Awaiting Completion       A1c Screening (Every 6 Months) Order placed this encounter      02/19/2025  Order placed for POCT Hemoglobin A1C by Gloria Ibarra, Siddharth Ass't    07/09/2024  POCT  A1C    02/20/2024  POCT  A1C    08/16/2023  POCT Hemoglobin A1C    04/12/2023  POCT Hemoglobin A1C     Only the first 5 history entries have been loaded, but more history exists.                    Upcoming       IMM DTaP/Tdap/Td Vaccine (1 - Tdap) Postponed until 2/26/2026      No completion history exists for this topic.              Diabetes: Urine Protein Screening (Yearly) Next due on 7/9/2025 07/09/2024  MICROALBUMIN CREAT RATIO URINE    06/22/2023  MICROALBUMIN CREAT RATIO URINE    01/10/2022  MICROALBUMIN CREAT RATIO URINE    09/16/2020  MICROALBUMIN CREAT RATIO URINE    06/27/2019  MICROALBUMIN CREAT RATIO URINE (LAB COLLECT)     Only the first 5 history entries have been loaded, but more history exists.            Diabetes: Monofilament / LE Exam (Yearly) Next due on 7/9/2025 07/09/2024  [employer]: FINDINGS - TESTS - NEUROLOGY - MONOFILAMENT TEST - LEFT FOOT - NUMBER OF SITES TESTED    07/09/2024  [employer]: FINDINGS - TESTS -  NEUROLOGY - MONOFILAMENT TEST - RIGHT FOOT - NUMBER OF SITES TESTED    07/09/2024  Diabetic Monofilament LE Exam    04/12/2023  Diabetic Monofilament LE Exam    01/11/2022  Diabetic Monofilament Lower Extremity Exam      Only the first 5 history entries have been loaded, but more history exists.              SERUM CREATININE (Yearly) Next due on 7/10/2025      07/10/2024  Comp Metabolic Panel    02/22/2024  Basic Metabolic Panel    11/14/2023  Basic Metabolic Panel    05/19/2023  Basic Metabolic Panel    01/07/2022  Comp Metabolic Panel      Only the first 5 history entries have been loaded, but more history exists.              Annual Wellness Visit (Yearly) Next due on 2/19/2026 02/19/2025  Visit Dx: Encounter for Medicare annual wellness exam    05/13/2024  Level of Service: [place] ANNUAL WELLNESS VISIT-INCLUDES PPPS SUBSEQUE*    03/18/2024  Level of Service: ANNUAL WELLNESS VISIT-INCLUDES PPPS SUBSEQUE*    02/19/2015  Done              Colorectal Cancer Screening (Colon Cancer Screening Cologuard Stool (FIT DNA) - Preferred) Next due on 2/28/2034 02/29/2024  Surgical Procedure: MI COLONOSCOPY,DIAGNOSTIC    08/09/2023  COLONOSCOPY RESULTS    05/08/2023  COLOGUARD COLON CANCER SCREENING    05/08/2023  Noninvasive Colorectal Cancer DNA and Occult Blood Screening component of COLOGUARD COLON CANCER SCREENING    08/28/2014  AMB REFERRAL TO GI FOR COLONOSCOPY      Only the first 5 history entries have been loaded, but more history exists.                      Completed or No Longer Recommended       Hepatitis C Screening  Completed      05/21/2018  Hepatitis C Antibody component of HEP C VIRUS ANTIBODY              Hepatitis A Vaccine (Hep A) (Series Information) Aged Out      No completion history exists for this topic.              Hepatitis B Vaccine (Hep B) (Series Information) Aged Out     No completion history exists for this topic.              HPV Vaccines (Series Information) Aged Out     No completion  history exists for this topic.              Polio Vaccine (Inactivated Polio) (Series Information) Aged Out     No completion history exists for this topic.              Meningococcal Immunization (Series Information) Aged Out     No completion history exists for this topic.              Cervical Cancer Screening  Discontinued        Frequency changed to Never automatically (Topic No Longer Applies)    05/01/2018  THINPREP PAP WITH HPV    05/01/2018  PATHOLOGY GYN SPECIMEN    02/19/2015  PATHOLOGY GYN SPECIMEN    02/19/2015  THINPREP PAP W/HPV AND CTNG     Only the first 5 history entries have been loaded, but more history exists.                          Patient Care Team:  ALON Garland as PCP - General (Nurse Practitioner Family)  Elvin Monterroso M.D. as Consulting Physician (Endocrinology)      Social History     Tobacco Use    Smoking status: Every Day     Current packs/day: 0.25     Average packs/day: 0.3 packs/day for 28.1 years (7.0 ttl pk-yrs)     Types: Cigarettes     Start date: 1997    Smokeless tobacco: Never    Tobacco comments:     started at 42/3 cigs/day   Vaping Use    Vaping status: Never Used   Substance Use Topics    Alcohol use: Not Currently    Drug use: No     Family History   Problem Relation Age of Onset    Diabetes Mother     Heart Disease Mother         MI    Hypertension Mother     Stroke Father     Hypertension Father     Diabetes Sister     Colon Cancer Maternal Grandmother 80     She  has a past medical history of Anxiety, Arrhythmia, Breath shortness, Chronic post-traumatic headache, not intractable (03/23/2017), Dental disorder, Depression, Diabetes (HCC), Grave's disease, High cholesterol, Hypertension, Meningitis (1950), and Seizures (HCC).   Past Surgical History:   Procedure Laterality Date    MT COLONOSCOPY-FLEXIBLE N/A 2/29/2024    Procedure: COLONOSCOPY WITH BIOPSY;  Surgeon: Edwin Rivero M.D.;  Location: SURGERY [facility];  Service: Gastroenterology     "ACHILLES TENDON REPAIR Right     TUBAL LIGATION         Exam:   /64   Pulse (!) 55   Temp 36.4 °C (97.6 °F) (Temporal)   Ht 1.702 m (5' 7\")   Wt 54.5 kg (120 lb 3.2 oz)   SpO2 98%  Body mass index is 18.83 kg/m².    Hearing {GOOD/FAIR/POOR/EXCELLENT:96209}.    Dentition {DENTITION:18659}  Alert, oriented in no acute distress.  Eye contact is good, speech goal directed, affect calm    Assessment and Plan. The following treatment and monitoring plan is recommended:  ***  1. Encounter for Medicare annual wellness exam    2. Graves' disease    3. Palpitations    4. Essential hypertension    5. Seizures (HCC)    6. Type 2 diabetes mellitus with hyperglycemia, with long-term current use of insulin (HCC)  - POCT Hemoglobin A1C      Services suggested: { AWV COORDINATION OF SERVICES:20405}  Health Care Screening: Age-appropriate preventive services recommended by USPTF and ACIP covered by Medicare were discussed today. Services ordered if indicated and agreed upon by the patient.  Referrals offered: Community-based lifestyle interventions to reduce health risks and promote self-management and wellness, fall prevention, nutrition, physical activity, tobacco-use cessation, weight loss, and mental health services as per orders if indicated.    Discussion today about general wellness and lifestyle habits:    Prevent falls and reduce trip hazards; Cautioned about securing or removing rugs.  Have a working fire alarm and carbon monoxide detector;   Engage in regular physical activity and social activities     Follow-up: No follow-ups on file.  "

## 2025-02-20 DIAGNOSIS — R35.0 FREQUENT URINATION: ICD-10-CM

## 2025-02-22 LAB
BACTERIA UR CULT: NORMAL
SIGNIFICANT IND 70042: NORMAL
SITE SITE: NORMAL
SOURCE SOURCE: NORMAL

## 2025-02-25 ENCOUNTER — PHARMACY VISIT (OUTPATIENT)
Dept: PHARMACY | Facility: MEDICAL CENTER | Age: 70
End: 2025-02-25
Payer: COMMERCIAL

## 2025-02-26 ENCOUNTER — TELEPHONE (OUTPATIENT)
Dept: ENDOCRINOLOGY | Facility: MEDICAL CENTER | Age: 70
End: 2025-02-26
Payer: MEDICARE

## 2025-02-26 NOTE — TELEPHONE ENCOUNTER
Pt left  vm wanting to schedule new pt appt through referral. Called pt and tried leaving a vm to let pt know referral still needs to be reviewed and we would call her once reviewed but mailbox was full

## 2025-03-03 PROBLEM — E11.69 TYPE 2 DIABETES MELLITUS WITH OTHER SPECIFIED COMPLICATION (HCC): Status: ACTIVE | Noted: 2025-03-03

## 2025-03-05 ENCOUNTER — TELEPHONE (OUTPATIENT)
Dept: HEALTH INFORMATION MANAGEMENT | Facility: OTHER | Age: 70
End: 2025-03-05
Payer: MEDICARE

## 2025-03-06 NOTE — Clinical Note
REFERRAL APPROVAL NOTICE         Sent on March 6, 2025                   Gia Farmer  [address]   [address]  [place] [address]                   Dear Ms. Farmer,    After a careful review of the medical information and benefit coverage, [facility] has processed your referral. See below for additional details.    If applicable, you must be actively enrolled with your insurance for coverage of the authorized service. If you have any questions regarding your coverage, please contact your insurance directly.    REFERRAL INFORMATION   Referral #:  [id number]  Referred-To Department    Referred-By Provider:  Endocrinology    ALON Garland   Endocrinology [facility]      [address]  [address]  [place] [address]  [phone] [address], [address]  [facility] [id number] [phone]    Referral Start Date:  02/19/2025  Referral End Date:   02/19/2026           SCHEDULING  If you do not already have an appointment, please call [phone] to make an appointment.   MORE INFORMATION  As a reminder, [facility] ownership has changed, meaning this location is now owned and operated by [facility]. As such, we want to clarify that our patients should expect to receive two separate bills for the services received at [facility] - one representing the [facility] facility fees as the owner of the establishment, and the other to represent the physician's services and subsequent fees. You can speak with your insurance carrier for a pricing estimate by calling the customer service number on the back of your card and ask about charges for a hospital outpatient visit.  If you do not already have a [employer] account, sign up at: [email or web address]  You can access your medical information, make appointments, see lab results, billing information, and more.  If you have questions regarding this referral, please contact  the [facility] Referrals department at:              [phone]. Monday - Friday 7:30AM - 5:00PM.      Sincerely,  [facility]

## 2025-03-14 ENCOUNTER — HOSPITAL ENCOUNTER (OUTPATIENT)
Dept: RADIOLOGY | Facility: MEDICAL CENTER | Age: 70
End: 2025-03-14
Payer: MEDICARE

## 2025-03-14 DIAGNOSIS — Z78.0 MENOPAUSE: ICD-10-CM

## 2025-03-14 DIAGNOSIS — Z12.31 ENCOUNTER FOR SCREENING MAMMOGRAM FOR BREAST CANCER: ICD-10-CM

## 2025-03-14 PROCEDURE — 77067 SCR MAMMO BI INCL CAD: CPT

## 2025-03-14 PROCEDURE — 77080 DXA BONE DENSITY AXIAL: CPT

## 2025-03-18 ENCOUNTER — RESULTS FOLLOW-UP (OUTPATIENT)
Dept: MEDICAL GROUP | Facility: MEDICAL CENTER | Age: 70
End: 2025-03-18
Payer: MEDICARE

## 2025-04-07 PROBLEM — Z13.820 ENCOUNTER FOR OSTEOPOROSIS SCREENING IN ASYMPTOMATIC POSTMENOPAUSAL PATIENT: Status: RESOLVED | Noted: 2024-05-13 | Resolved: 2025-04-07

## 2025-04-07 PROBLEM — Z78.0 ENCOUNTER FOR OSTEOPOROSIS SCREENING IN ASYMPTOMATIC POSTMENOPAUSAL PATIENT: Status: RESOLVED | Noted: 2024-05-13 | Resolved: 2025-04-07

## 2025-04-07 NOTE — ASSESSMENT & PLAN NOTE
Chronic, stable. Currently taking methimazole 10 mg twice daily. Previously followed by endocrinology, Dr. Monterroso.

## 2025-04-07 NOTE — ASSESSMENT & PLAN NOTE
Chronic, stable. Currently taking atenolol 25 mg and lisinopril-hydrochlorothiazide 10-12.5 mg daily. In-office blood pressure is   Denies chest pain, lightheadedness, and lower extremity edema. Continue to follow with primary care provider for medication and symptom management.

## 2025-04-07 NOTE — ASSESSMENT & PLAN NOTE
Chronic, ongoing. Most recent hemoglobin A1C was 7.5 in February 2025. Currently taking units of insulin glargine daily and pioglitazone 30 mg daily. Routinely monitored by primary care provider.  --In-office retinopathy screening ordered.  --In-office urine protein screening ordered.

## 2025-04-07 NOTE — ASSESSMENT & PLAN NOTE
Chronic, stable. Reviewed lipid profile from February 2025. Currently taking simvastatin 20 mg daily. Provided education on heart healthy diet with adequate intake of fruits, vegetables, and whole grains. Encourage 30 minutes of moderate exercise 3-4 times a week. Provided [employer] gym resources. Denies chest pain and claudication. Routinely monitored by primary care provider.

## 2025-04-10 ENCOUNTER — APPOINTMENT (OUTPATIENT)
Dept: MEDICAL GROUP | Facility: MEDICAL CENTER | Age: 70
End: 2025-04-10
Payer: MEDICARE

## 2025-04-10 DIAGNOSIS — E11.69 DYSLIPIDEMIA ASSOCIATED WITH TYPE 2 DIABETES MELLITUS (HCC): ICD-10-CM

## 2025-04-10 DIAGNOSIS — E05.00 GRAVES' DISEASE: ICD-10-CM

## 2025-04-10 DIAGNOSIS — E78.5 DYSLIPIDEMIA ASSOCIATED WITH TYPE 2 DIABETES MELLITUS (HCC): ICD-10-CM

## 2025-04-10 DIAGNOSIS — E11.59 HYPERTENSION ASSOCIATED WITH TYPE 2 DIABETES MELLITUS (HCC): ICD-10-CM

## 2025-04-10 DIAGNOSIS — Z79.4 TYPE 2 DIABETES MELLITUS WITH HYPERGLYCEMIA, WITH LONG-TERM CURRENT USE OF INSULIN (HCC): ICD-10-CM

## 2025-04-10 DIAGNOSIS — E11.65 TYPE 2 DIABETES MELLITUS WITH HYPERGLYCEMIA, WITH LONG-TERM CURRENT USE OF INSULIN (HCC): ICD-10-CM

## 2025-04-10 DIAGNOSIS — I15.2 HYPERTENSION ASSOCIATED WITH TYPE 2 DIABETES MELLITUS (HCC): ICD-10-CM

## 2025-04-14 ENCOUNTER — OFFICE VISIT (OUTPATIENT)
Dept: MEDICAL GROUP | Facility: MEDICAL CENTER | Age: 70
End: 2025-04-14
Payer: MEDICARE

## 2025-04-14 VITALS
WEIGHT: 128.4 LBS | TEMPERATURE: 97.7 F | DIASTOLIC BLOOD PRESSURE: 74 MMHG | RESPIRATION RATE: 14 BRPM | SYSTOLIC BLOOD PRESSURE: 122 MMHG | HEART RATE: 59 BPM | BODY MASS INDEX: 20.15 KG/M2 | OXYGEN SATURATION: 59 % | HEIGHT: 67 IN

## 2025-04-14 DIAGNOSIS — Z00.00 ENCOUNTER FOR MEDICARE ANNUAL WELLNESS EXAM: ICD-10-CM

## 2025-04-14 DIAGNOSIS — E11.65 TYPE 2 DIABETES MELLITUS WITH HYPERGLYCEMIA, WITH LONG-TERM CURRENT USE OF INSULIN (HCC): ICD-10-CM

## 2025-04-14 DIAGNOSIS — E05.00 GRAVES' DISEASE: ICD-10-CM

## 2025-04-14 DIAGNOSIS — E11.69 DYSLIPIDEMIA ASSOCIATED WITH TYPE 2 DIABETES MELLITUS (HCC): ICD-10-CM

## 2025-04-14 DIAGNOSIS — I15.2 HYPERTENSION ASSOCIATED WITH TYPE 2 DIABETES MELLITUS (HCC): ICD-10-CM

## 2025-04-14 DIAGNOSIS — Z79.4 TYPE 2 DIABETES MELLITUS WITH HYPERGLYCEMIA, WITH LONG-TERM CURRENT USE OF INSULIN (HCC): ICD-10-CM

## 2025-04-14 DIAGNOSIS — E11.59 HYPERTENSION ASSOCIATED WITH TYPE 2 DIABETES MELLITUS (HCC): ICD-10-CM

## 2025-04-14 DIAGNOSIS — E78.5 DYSLIPIDEMIA ASSOCIATED WITH TYPE 2 DIABETES MELLITUS (HCC): ICD-10-CM

## 2025-04-14 PROBLEM — E11.9 DIABETES MELLITUS (HCC): Status: ACTIVE | Noted: 2025-04-14

## 2025-04-14 PROCEDURE — 3078F DIAST BP <80 MM HG: CPT

## 2025-04-14 PROCEDURE — G0439 PPPS, SUBSEQ VISIT: HCPCS

## 2025-04-14 PROCEDURE — 3074F SYST BP LT 130 MM HG: CPT

## 2025-04-14 ASSESSMENT — ACTIVITIES OF DAILY LIVING (ADL): BATHING_REQUIRES_ASSISTANCE: 0

## 2025-04-14 ASSESSMENT — FIBROSIS 4 INDEX: FIB4 SCORE: 1.63

## 2025-04-14 ASSESSMENT — PATIENT HEALTH QUESTIONNAIRE - PHQ9: CLINICAL INTERPRETATION OF PHQ2 SCORE: 0

## 2025-04-14 ASSESSMENT — ENCOUNTER SYMPTOMS: GENERAL WELL-BEING: GOOD

## 2025-04-14 NOTE — PROGRESS NOTES
Chief Complaint   Patient presents with    Annual Exam       History of Present Illness  The patient presents for an annual wellness examination and evaluation of her diabetes mellitus and thyroid dysfunction.    Diabetes Mellitus  She reports a slight increase in weight, which she attributes to improved glycemic control. She denies any recent episodes of urinary incontinence, a symptom previously correlated with hyperglycemia. Her current pharmacotherapy includes Actos and Lantus, with a dosage of 14 units administered in the morning.  - Onset: No recent episodes of urinary incontinence.  - Character: Slight increase in weight, improved glycemic control.  - Alleviating/Aggravating Factors: Actos and Lantus, 14 units in the morning.    Thyroid Dysfunction  The patient has been compliant with her thyroid medication regimen but has not yet attended a follow-up consultation with her endocrinologist.  Her last TSH was less than 0.005.  She is in the process of scheduling this appointment, pending authorization from [facility].  - Alleviating/Aggravating Factors: Compliance with thyroid medication regimen.  - Timing: Pending follow-up consultation with endocrinologist.     Her cholesterol is well managed on Simvastatin    Blood pressure is well controlled on Prinzide. /74.      Gia Farmer is a 69 y.o. here for Medicare Annual Wellness Visit     Patient Active Problem List    Diagnosis Date Noted    Diabetes mellitus (HCC) 04/14/2025    History of seizure 03/18/2024    History of colon cancer 03/18/2024    Body mass index (BMI) 19.9 or less, adult 03/18/2024    Type 2 diabetes mellitus with hyperglycemia, with long-term current use of insulin (HCC) 10/23/2017    Tobacco use 04/21/2017    Dyslipidemia associated with type 2 diabetes mellitus (HCC) 09/30/2014    Graves' disease 01/09/2014    Hypertension associated with type 2 diabetes mellitus (HCC) 04/15/2013       Current Outpatient Medications    Medication Sig Dispense Refill    atenolol (TENORMIN) 25 MG Tab Take 1 tablet by mouth every day. 100 Tablet 3    lisinopril-hydrochlorothiazide (PRINZIDE) 10-12.5 MG per tablet Take 1 Tablet by mouth every day. 90 Tablet 3    methimazole (TAPAZOLE) 10 MG Tab Take 1 tablet by mouth 2 times a day. 180 Tablet 3    insulin glargine (LANTUS SOLOSTAR) 100 UNIT/ML Solution Pen-injector injection If morning fasting blood sugar is over 160 inject 14 units subcutaneously once daily.  If fasting blood sugar is between 120-159 inject 12 unit subcutaneously once daily. If fasting blood sugar is below 120 inject 10 units subcutaneously once daily. 15 mL 5    simvastatin (ZOCOR) 20 MG Tab Take 1 Tablet by mouth every evening. 90 Tablet 3    pioglitazone (ACTOS) 30 MG Tab Take 30 mg by mouth every day. Takes PRN now      Misc. Devices Misc One glucerna daily 90 Each 3    Blood Glucose Monitoring Suppl (TRUE METRIX METER) w/Device Kit Test blood sugar as recommended by provider.  Dx E 11.65 1 Kit PRN    glucose blood (TRUE METRIX BLOOD GLUCOSE TEST) strip For test strips: Use 3x/day and as needed for signs and symptoms of high or low blood sugar. Dx E 11.65 300 Strip 11    Lancets Use 3 times daily and as needed for signs and symptoms of high or low blood sugar. Dx E 11.65 100 Each 3    albuterol (PROVENTIL HFA) 108 (90 Base) MCG/ACT Aero Soln inhalation aerosol Inhale 2 Puffs every four hours as needed for Shortness of Breath. 2 g 3     No current facility-administered medications for this visit.          Current supplements as per medication list.     Allergies: Patient has no known allergies.    Current social contact/activities: goes to [employer] and spends time with different [employer] groups. She spends time with her neighbors.     She  reports that she has been smoking cigarettes. She started smoking about 28 years ago. She has a 7.1 pack-year smoking history. She has never used smokeless tobacco. She reports that she does not  currently use alcohol. She reports that she does not use drugs.  Ready to quit: Not Answered  Counseling given: Not Answered  Tobacco comments: started at 42/3 cigs/day      ROS:    Gait: Uses no assistive device  Ostomy: No  Other tubes: No  Amputations: No  Chronic oxygen use: No  Last eye exam: 2022  Wears hearing aids: No   : Denies any urinary leakage during the last 6 months    Screening:    Depression Screening  Little interest or pleasure in doing things?  0 - not at all  Feeling down, depressed , or hopeless? 0 - not at all  Trouble falling or staying asleep, or sleeping too much?     Feeling tired or having little energy?     Poor appetite or overeating?     Feeling bad about yourself - or that you are a failure or have let yourself or your family down?    Trouble concentrating on things, such as reading the newspaper or watching television?    Moving or speaking so slowly that other people could have noticed.  Or the opposite - being so fidgety or restless that you have been moving around a lot more than usual?     Thoughts that you would be better off dead, or of hurting yourself?     Patient Health Questionnaire Score:      If depressive symptoms identified deferred to follow up visit unless specifically addressed in assessment and plan.    Interpretation of PHQ-9 Total Score   Score Severity   1-4 No Depression   5-9 Mild Depression   10-14 Moderate Depression   15-19 Moderately Severe Depression   20-27 Severe Depression    Screening for Cognitive Impairment  Do you or any of your friends or family members have any concern about your memory? No  Three Minute Recall (Village, Kitchen, Baby) 3/3    Vineet clock face with all 12 numbers and set the hands to show 10 minutes past 11.  Yes    Cognitive concerns identified deferred for follow up unless specifically addressed in assessment and plan.    Fall Risk Assessment  Has the patient had two or more falls in the last year or any fall with injury in the  last year?  No    Safety Assessment  Do you always wear your seatbelt?  Yes  Any changes to home needed to function safely? No  Difficulty hearing.  No  Patient counseled about all safety risks that were identified.    Functional Assessment ADLs  Are there any barriers preventing you from cooking for yourself or meeting nutritional needs?  No.    Are there any barriers preventing you from driving safely or obtaining transportation?  No.    Are there any barriers preventing you from using a telephone or calling for help?  No    Are there any barriers preventing you from shopping?  No.    Are there any barriers preventing you from taking care of your own finances?  No    Are there any barriers preventing you from managing your medications?  No    Are there any barriers preventing you from showering, bathing or dressing yourself? No    Are there any barriers preventing you from doing housework or laundry? No    Are there any barriers preventing you from using the toilet?No    Are you currently engaging in any exercise or physical activity?  Yes.      Self-Assessment of Health  What is your perception of your health? Good    Do you sleep more than six hours a night? No    In the past 7 days, how much did pain keep you from doing your normal work? None    Do you spend quality time with family or friends (virtually or in person)? Yes    Do you usually eat a heart healthy diet that constists of a variety of fruits, vegetables, whole grains and fiber? Yes    Do you eat foods high in fat and/or Fast Food more than three times per week? No    How concerned are you that your medical conditions are not being well managed? Not at all    Are you worried that in the next 2 months, you may not have stable housing that you own, rent, or stay in as part of a household? No        Advance Care Planning  Do you have an Advance Directive, Living Will, Durable Power of , or POLST? No                  Health Maintenance Summary             Current Care Gaps       Diabetes: Retinopathy Screening (Yearly) Overdue since 6/22/2024 06/22/2023  POCT Retinal Eye Exam    01/28/2022  AMB EXTERNAL RETINAL SCREENING RESULTS    01/28/2022  REFERRAL FOR RETINAL SCREENING EXAM    12/19/2016  POCT Retinal Eye Exam    03/31/2016  AMB REFERRAL FOR RETINAL SCREENING EXAM     Only the first 5 history entries have been loaded, but more history exists.            COVID-19 Vaccine (2 - 2024-25 season) Overdue since 9/1/2024      04/10/2021  Imm Admin: Crack SARS-CoV-2 Vaccine              Zoster (Shingles) Vaccines (1 of 2) Never done      No completion history exists for this topic.                      Upcoming       Pneumococcal Vaccine: 50+ Years (2 of 2 - PCV) Postponed until 2/19/2026 09/30/2014  Imm Admin: Pneumococcal polysaccharide vaccine (PPSV-23)              IMM DTaP/Tdap/Td Vaccine (1 - Tdap) Postponed until 2/26/2026     No completion history exists for this topic.              Diabetes: Urine Protein Screening (Yearly) Next due on 7/9/2025 07/09/2024  MICROALBUMIN CREAT RATIO URINE    06/22/2023  MICROALBUMIN CREAT RATIO URINE    01/10/2022  MICROALBUMIN CREAT RATIO URINE    09/16/2020  MICROALBUMIN CREAT RATIO URINE    06/27/2019  MICROALBUMIN CREAT RATIO URINE (LAB COLLECT)     Only the first 5 history entries have been loaded, but more history exists.            Diabetes: Monofilament / LE Exam (Yearly) Next due on 7/9/2025 07/09/2024  [employer]: FINDINGS - TESTS - NEUROLOGY - MONOFILAMENT TEST - LEFT FOOT - NUMBER OF SITES TESTED    07/09/2024  [employer]: FINDINGS - TESTS - NEUROLOGY - MONOFILAMENT TEST - RIGHT FOOT - NUMBER OF SITES TESTED    07/09/2024  Diabetic Monofilament LE Exam    04/12/2023  Diabetic Monofilament LE Exam    01/11/2022  Diabetic Monofilament Lower Extremity Exam      Only the first 5 history entries have been loaded, but more history exists.              A1c Screening (Every 6 Months) Next due on  8/19/2025 02/19/2025  POCT Hemoglobin A1C    07/09/2024  POCT  A1C    02/20/2024  POCT  A1C    08/16/2023  POCT Hemoglobin A1C    04/12/2023  POCT Hemoglobin A1C      Only the first 5 history entries have been loaded, but more history exists.              Influenza Vaccine (Season Ended) Next due on 9/1/2025 09/30/2014  Imm Admin: Influenza Seasonal Injectable - Historical Data    09/30/2014  Imm Admin: INFLUENZA TIV (IM)              Fasting Lipid Profile (Yearly) Next due on 2/19/2026 02/19/2025  Lipid Profile    05/19/2023  Lipid Profile    01/07/2022  Lipid Profile    09/16/2020  Lipid Profile    06/27/2019  Lipid Profile      Only the first 5 history entries have been loaded, but more history exists.              SERUM CREATININE (Yearly) Next due on 2/19/2026 02/19/2025  Comp Metabolic Panel    07/10/2024  Comp Metabolic Panel    02/22/2024  Basic Metabolic Panel    11/14/2023  Basic Metabolic Panel    05/19/2023  Basic Metabolic Panel      Only the first 5 history entries have been loaded, but more history exists.              Mammogram (Yearly) Next due on 3/14/2026      03/14/2025  MA-SCREENING MAMMO BILAT W/TOMOSYNTHESIS W/CAD    10/18/2023  MA-SCREENING MAMMO BILAT W/TOMOSYNTHESIS W/CAD    10/11/2022  MA-SCREENING MAMMO BILAT W/TOMOSYNTHESIS W/CAD    08/20/2021  MA-SCREENING MAMMO BILAT W/TOMOSYNTHESIS W/CAD    11/13/2019  MA-SCREENING MAMMO BILAT W/TOMOSYNTHESIS W/CAD      Only the first 5 history entries have been loaded, but more history exists.              Annual Wellness Visit (Yearly) Next due on 4/14/2026 04/14/2025  Level of Service: [place] ANNUAL WELLNESS VISIT-INCLUDES PPPS SUBSEQUE*    04/14/2025  Visit Dx: Encounter for Medicare annual wellness exam    05/13/2024  Level of Service: [place] ANNUAL WELLNESS VISIT-INCLUDES PPPS SUBSEQUE*    03/18/2024  Level of Service: ANNUAL WELLNESS VISIT-INCLUDES PPPS SUBSEQUE*    02/19/2015  Done      Only the first 5 history entries have  been loaded, but more history exists.              Bone Density Scan (Every 5 Years) Next due on 3/14/2030      03/14/2025  DS-BONE DENSITY STUDY (DEXA)    06/02/2009  DS-BONE DENSITY STUDY (DEXA)              Colorectal Cancer Screening (Colon Cancer Screening Cologuard Stool (FIT DNA) - Preferred) Next due on 2/28/2034 02/29/2024  Surgical Procedure: [place] COLONOSCOPY,DIAGNOSTIC    08/09/2023  COLONOSCOPY RESULTS    05/08/2023  COLOGUARD COLON CANCER SCREENING    05/08/2023  Noninvasive Colorectal Cancer DNA and Occult Blood Screening component of COLOGUARD COLON CANCER SCREENING    08/28/2014  AMB REFERRAL TO GI FOR COLONOSCOPY      Only the first 5 history entries have been loaded, but more history exists.                      Completed or No Longer Recommended       Hepatitis C Screening  Completed      05/21/2018  Hepatitis C Antibody component of HEP C VIRUS ANTIBODY              Hepatitis A Vaccine (Hep A) (Series Information) Aged Out      No completion history exists for this topic.              Hepatitis B Vaccine (Hep B) (Series Information) Aged Out     No completion history exists for this topic.              HPV Vaccines (Series Information) Aged Out     No completion history exists for this topic.              Polio Vaccine (Inactivated Polio) (Series Information) Aged Out     No completion history exists for this topic.              Meningococcal Immunization (Series Information) Aged Out     No completion history exists for this topic.              Cervical Cancer Screening  Discontinued        Frequency changed to Never automatically (Topic No Longer Applies)    05/01/2018  THINPREP PAP WITH HPV    05/01/2018  PATHOLOGY GYN SPECIMEN    02/19/2015  PATHOLOGY GYN SPECIMEN    02/19/2015  THINPREP PAP W/HPV AND CTNG     Only the first 5 history entries have been loaded, but more history exists.                          Patient Care Team:  ALON Garland as PCP - General (Nurse  "Practitioner Family)  Elvin Monterroso M.D. as Consulting Physician (Endocrinology)      Social History     Tobacco Use    Smoking status: Every Day     Current packs/day: 0.25     Average packs/day: 0.3 packs/day for 28.3 years (7.1 ttl pk-yrs)     Types: Cigarettes     Start date: 1997    Smokeless tobacco: Never    Tobacco comments:     started at 42/3 cigs/day   Vaping Use    Vaping status: Never Used   Substance Use Topics    Alcohol use: Not Currently    Drug use: No     Family History   Problem Relation Age of Onset    Diabetes Mother     Heart Disease Mother         MI    Hypertension Mother     Stroke Father     Hypertension Father     Diabetes Sister     Colon Cancer Maternal Grandmother 80     She  has a past medical history of Anxiety, Arrhythmia, Breath shortness, Chronic post-traumatic headache, not intractable (03/23/2017), Dental disorder, Depression, Diabetes (HCC), Grave's disease, High cholesterol, Hypertension, Meningitis (1950), and Seizures (HCC).   Past Surgical History:   Procedure Laterality Date    [place] COLONOSCOPY-FLEXIBLE N/A 2/29/2024    Procedure: COLONOSCOPY WITH BIOPSY;  Surgeon: Edwin Rivero M.D.;  Location: SURGERY [facility];  Service: Gastroenterology    ACHILLES TENDON REPAIR Right     TUBAL LIGATION         Exam:   /74   Pulse (!) 59   Temp 36.5 °C (97.7 °F) (Temporal)   Resp 14   Ht 1.702 m (5' 7\")   Wt 58.2 kg (128 lb 6.4 oz)   SpO2 (!) 59%  Body mass index is 20.11 kg/m².    Hearing good.    Dentition upper and lower partial dentures  Alert, oriented in no acute distress.  Eye contact is good, speech goal directed, affect calm    Assessment & Plan     The following treatment and monitoring plan is recommended:    1. Encounter for Medicare annual wellness exam    2. Graves' disease  Chronic, unstable  Her thyroid levels were unstable a few months ago, TSH was < 0.005, and she believes they have decreased again. She has gained 8 pounds since her last visit, " bringing her weight to 128 pounds.  Diagnostic plan: She is advised to call the endocrinology office to schedule an appointment.   Treatment plan: She has been advised to continue Methimazole 10 mg BID and follow up with endocrinology.    3. Type 2 diabetes mellitus with hyperglycemia, with long-term current use of insulin (HCC)  Chronic, unstable  Her A1c level was 7.5 at the last check, indicating it was slightly elevated. She reports that her blood sugar levels have improved, and she is no longer experiencing urinary incontinence, which she felt was due to high blood sugars.  Diagnostic plan: An eye exam is recommended, but the machine is currently broken. She will have her eyes checked once the machine is fixed.  Treatment plan: She is currently taking Actos 30 mg daily and Lantus 14 units in the morning. Continue to work on diet and exercise and continue medications as directed.     4. Hypertension associated with type 2 diabetes mellitus (HCC)  Chronic, stable  Her blood pressure readings are within the normal range today at 122/74 mmHg.  Continue Prinzide 10-12.5 mg daily.    5. Dyslipidemia associated with type 2 diabetes mellitus (HCC)  Chronic, stable  Her cholesterol levels were evaluated in February 2025 and found to be excellent.   Treatment plan: She is currently taking simvastatin for cholesterol management.  Continue simvastatin 20 mg nightly.    Services suggested: No services needed at this time  Health Care Screening: Age-appropriate preventive services recommended by USPTF and ACIP covered by Medicare were discussed today. Services ordered if indicated and agreed upon by the patient.  Referrals offered: Community-based lifestyle interventions to reduce health risks and promote self-management and wellness, fall prevention, nutrition, physical activity, tobacco-use cessation, weight loss, and mental health services as per orders if indicated.    Discussion today about general wellness and  lifestyle habits:    Prevent falls and reduce trip hazards; Cautioned about securing or removing rugs.  Have a working fire alarm and carbon monoxide detector;   Engage in regular physical activity and social activities     Follow-up: Return in about 2 months (around 6/14/2025).

## 2025-04-16 ENCOUNTER — TELEPHONE (OUTPATIENT)
Dept: HEALTH INFORMATION MANAGEMENT | Facility: OTHER | Age: 70
End: 2025-04-16
Payer: MEDICARE

## 2025-05-12 NOTE — ASSESSMENT & PLAN NOTE
Chronic, ongoing. Most recent hemoglobin A1C was 7.5 in February 2025. Currently taking pioglitazone 30 mg and 14 units of insulin glargine daily. Routinely monitored by primary care provider.

## 2025-05-12 NOTE — ASSESSMENT & PLAN NOTE
Chronic, stable. Currently taking methimazole 10 mg twice daily. Reports that she is currently asymptomatic, but was previously having palpitations. Reports that she has an appointment in two weeks to establish care with endocrinology, Dr. Wiggins.

## 2025-05-12 NOTE — ASSESSMENT & PLAN NOTE
Chronic, stable. Currently taking atenolol 25 mg and lisinopril-hydrochlorothiazide 10-12.5 mg daily. In-office blood pressure is 138/52. Denies chest pain, lightheadedness, and lower extremity edema. Continue to follow with primary care provider for medication and symptom management.

## 2025-05-13 ENCOUNTER — OFFICE VISIT (OUTPATIENT)
Dept: MEDICAL GROUP | Facility: MEDICAL CENTER | Age: 70
End: 2025-05-13
Payer: MEDICARE

## 2025-05-13 VITALS
HEIGHT: 67 IN | OXYGEN SATURATION: 95 % | HEART RATE: 68 BPM | SYSTOLIC BLOOD PRESSURE: 138 MMHG | WEIGHT: 138.2 LBS | TEMPERATURE: 98.2 F | BODY MASS INDEX: 21.69 KG/M2 | DIASTOLIC BLOOD PRESSURE: 52 MMHG

## 2025-05-13 DIAGNOSIS — E11.69 DYSLIPIDEMIA ASSOCIATED WITH TYPE 2 DIABETES MELLITUS (HCC): ICD-10-CM

## 2025-05-13 DIAGNOSIS — E78.5 DYSLIPIDEMIA ASSOCIATED WITH TYPE 2 DIABETES MELLITUS (HCC): ICD-10-CM

## 2025-05-13 DIAGNOSIS — Z72.0 TOBACCO USE: ICD-10-CM

## 2025-05-13 DIAGNOSIS — E11.65 TYPE 2 DIABETES MELLITUS WITH HYPERGLYCEMIA, WITH LONG-TERM CURRENT USE OF INSULIN (HCC): ICD-10-CM

## 2025-05-13 DIAGNOSIS — Z79.4 TYPE 2 DIABETES MELLITUS WITH HYPERGLYCEMIA, WITH LONG-TERM CURRENT USE OF INSULIN (HCC): ICD-10-CM

## 2025-05-13 DIAGNOSIS — E11.59 HYPERTENSION ASSOCIATED WITH TYPE 2 DIABETES MELLITUS (HCC): ICD-10-CM

## 2025-05-13 DIAGNOSIS — I15.2 HYPERTENSION ASSOCIATED WITH TYPE 2 DIABETES MELLITUS (HCC): ICD-10-CM

## 2025-05-13 DIAGNOSIS — E05.00 GRAVES' DISEASE: ICD-10-CM

## 2025-05-13 PROCEDURE — 3075F SYST BP GE 130 - 139MM HG: CPT

## 2025-05-13 PROCEDURE — 3078F DIAST BP <80 MM HG: CPT

## 2025-05-13 PROCEDURE — G0439 PPPS, SUBSEQ VISIT: HCPCS

## 2025-05-13 PROCEDURE — 1126F AMNT PAIN NOTED NONE PRSNT: CPT

## 2025-05-13 SDOH — ECONOMIC STABILITY: FOOD INSECURITY: WITHIN THE PAST 12 MONTHS, THE FOOD YOU BOUGHT JUST DIDN'T LAST AND YOU DIDN'T HAVE MONEY TO GET MORE.: NEVER TRUE

## 2025-05-13 SDOH — ECONOMIC STABILITY: FOOD INSECURITY: WITHIN THE PAST 12 MONTHS, YOU WORRIED THAT YOUR FOOD WOULD RUN OUT BEFORE YOU GOT THE MONEY TO BUY MORE.: NEVER TRUE

## 2025-05-13 SDOH — ECONOMIC STABILITY: FOOD INSECURITY: HOW HARD IS IT FOR YOU TO PAY FOR THE VERY BASICS LIKE FOOD, HOUSING, MEDICAL CARE, AND HEATING?: SOMEWHAT HARD

## 2025-05-13 SDOH — ECONOMIC STABILITY: TRANSPORTATION INSECURITY: IN THE PAST 12 MONTHS, HAS LACK OF TRANSPORTATION KEPT YOU FROM MEDICAL APPOINTMENTS OR FROM GETTING MEDICATIONS?: NO

## 2025-05-13 ASSESSMENT — ACTIVITIES OF DAILY LIVING (ADL)
LACK_OF_TRANSPORTATION: NO
BATHING_REQUIRES_ASSISTANCE: 0

## 2025-05-13 ASSESSMENT — PAIN SCALES - GENERAL: PAINLEVEL_OUTOF10: NO PAIN

## 2025-05-13 ASSESSMENT — ENCOUNTER SYMPTOMS: GENERAL WELL-BEING: FAIR

## 2025-05-13 ASSESSMENT — FIBROSIS 4 INDEX: FIB4 SCORE: 1.63

## 2025-05-13 ASSESSMENT — PATIENT HEALTH QUESTIONNAIRE - PHQ9: CLINICAL INTERPRETATION OF PHQ2 SCORE: 0

## 2025-05-13 NOTE — PROGRESS NOTES
Comprehensive Health Assessment Program     Gia Farmer is a 69 y.o. here for her comprehensive health assessment.    Patient Active Problem List    Diagnosis Date Noted    History of seizure 03/18/2024    History of colon cancer 03/18/2024    Type 2 diabetes mellitus with hyperglycemia, with long-term current use of insulin ([facility]) 10/23/2017    Tobacco use 04/21/2017    Dyslipidemia associated with type 2 diabetes mellitus ([facility]) 09/30/2014    Graves' disease 01/09/2014    Hypertension associated with type 2 diabetes mellitus ([facility]) 04/15/2013       Current Outpatient Medications   Medication Sig Dispense Refill    atenolol (TENORMIN) 25 MG Tab Take 1 tablet by mouth every day. 100 Tablet 3    lisinopril-hydrochlorothiazide (PRINZIDE) 10-12.5 MG per tablet Take 1 Tablet by mouth every day. 90 Tablet 3    methimazole (TAPAZOLE) 10 MG Tab Take 1 tablet by mouth 2 times a day. 180 Tablet 3    insulin glargine (LANTUS SOLOSTAR) 100 UNIT/ML Solution Pen-injector injection If morning fasting blood sugar is over 160 inject 14 units subcutaneously once daily.  If fasting blood sugar is between 120-159 inject 12 unit subcutaneously once daily. If fasting blood sugar is below 120 inject 10 units subcutaneously once daily. 15 mL 5    simvastatin (ZOCOR) 20 MG Tab Take 1 Tablet by mouth every evening. 90 Tablet 3    pioglitazone (ACTOS) 30 MG Tab Take 30 mg by mouth every day. Takes PRN now      Misc. Devices Misc One glucerna daily 90 Each 3    Blood Glucose Monitoring Suppl (TRUE METRIX METER) w/Device Kit Test blood sugar as recommended by provider.  Dx E 11.65 1 Kit PRN    glucose blood (TRUE METRIX BLOOD GLUCOSE TEST) strip For test strips: Use 3x/day and as needed for signs and symptoms of high or low blood sugar. Dx E 11.65 300 Strip 11    Lancets Use 3 times daily and as needed for signs and symptoms of high or low blood sugar. Dx E 11.65 100 Each 3    albuterol (PROVENTIL HFA) 108 (90 Base) MCG/ACT Aero Soln  inhalation aerosol Inhale 2 Puffs every four hours as needed for Shortness of Breath. 2 g 3     No current facility-administered medications for this visit.          Current supplements as per medication list.     Allergies:   Patient has no known allergies.  Social History     Tobacco Use    Smoking status: Every Day     Current packs/day: 0.25     Average packs/day: 0.3 packs/day for 28.4 years (7.1 ttl pk-yrs)     Types: Cigarettes     Start date: 1997    Smokeless tobacco: Never    Tobacco comments:     started at 42/3 cigs/day   Vaping Use    Vaping status: Never Used   Substance Use Topics    Alcohol use: Not Currently    Drug use: No     Family History   Problem Relation Age of Onset    Diabetes Mother     Heart Disease Mother         MI    Hypertension Mother     Stroke Father     Hypertension Father     Diabetes Sister     Colon Cancer Maternal Grandmother 80     Gia  has a past medical history of Anxiety, Arrhythmia, Breath shortness, Chronic post-traumatic headache, not intractable (03/23/2017), Dental disorder, Depression, Diabetes (HCC), Grave's disease, High cholesterol, Hypertension, Meningitis (1950), and Seizures (HCC).   Past Surgical History:   Procedure Laterality Date    VT COLONOSCOPY-FLEXIBLE N/A 2/29/2024    Procedure: COLONOSCOPY WITH BIOPSY;  Surgeon: Edwin Rivero M.D.;  Location: SURGERY [facility];  Service: Gastroenterology    ACHILLES TENDON REPAIR Right     TUBAL LIGATION         Screening:  In the last six months have you experienced any leakage of urine? No    Depression Screening  Little interest or pleasure in doing things?  0 - not at all  Feeling down, depressed , or hopeless? 0 - not at all  Patient Health Questionnaire Score:  0    If depressive symptoms identified deferred to follow up visit unless specifically addressed in assessment and plan.    Interpretation of PHQ-9 Total Score   Score Severity   1-4 No Depression   5-9 Mild Depression   10-14 Moderate Depression    15-19 Moderately Severe Depression   20-27 Severe Depression    Screening for Cognitive Impairment  Do you or any of your friends or family members have any concern about your memory? No  Three Minute Recall (Village, Kitchen, Baby) 3/3    Vineet clock face with all 12 numbers and set the hands to show 10 minutes past 11.  No Hands were set for wrong time.  Cognitive concerns identified deferred for follow up unless specifically addressed in assessment and plan.    Fall Risk Assessment  Has the patient had two or more falls in the last year or any fall with injury in the last year?  No    Safety Assessment  Do you always wear your seatbelt?  Yes  Any changes to home needed to function safely? No  Difficulty hearing.  No  Patient counseled about all safety risks that were identified.    Functional Assessment ADLs  Are there any barriers preventing you from cooking for yourself or meeting nutritional needs?  No.    Are there any barriers preventing you from driving safely or obtaining transportation?  No.    Are there any barriers preventing you from using a telephone or calling for help?  No    Are there any barriers preventing you from shopping?  No.    Are there any barriers preventing you from taking care of your own finances?  No    Are there any barriers preventing you from managing your medications?  No    Are there any barriers preventing you from showering, bathing or dressing yourself? No    Are there any barriers preventing you from doing housework or laundry? No    Are there any barriers preventing you from using the toilet?No    Are you currently engaging in any exercise or physical activity?  Yes. Walks dog.    Self-Assessment of Health  What is your perception of your health? Fair    Do you sleep more than six hours a night? No    In the past 7 days, how much did pain keep you from doing your normal work? None    Do you spend quality time with family or friends (virtually or in person)? Yes    Do you  usually eat a heart healthy diet that constists of a variety of fruits, vegetables, whole grains and fiber? Yes    Do you eat foods high in fat and/or Fast Food more than three times per week? No    How concerned are you that your medical conditions are not being well managed? Not at all    Are you worried that in the next 2 months, you may not have stable housing that you own, rent, or stay in as part of a household? No        Advance Care Planning  Do you have an Advance Directive, Living Will, Durable Power of , or POLST? No   Provided patient with educational brochure regarding Advance Care Planning.                  Health Maintenance Summary            Current Care Gaps       Diabetes: Retinopathy Screening (Yearly) Overdue since 6/22/2024 06/22/2023  POCT Retinal Eye Exam    01/28/2022  AMB EXTERNAL RETINAL SCREENING RESULTS    01/28/2022  REFERRAL FOR RETINAL SCREENING EXAM    12/19/2016  POCT Retinal Eye Exam    03/31/2016  AMB REFERRAL FOR RETINAL SCREENING EXAM     Only the first 5 history entries have been loaded, but more history exists.            COVID-19 Vaccine (2 - 2024-25 season) Overdue since 9/1/2024      04/10/2021  Imm Admin: PositiveID SARS-CoV-2 Vaccine              Zoster (Shingles) Vaccines (1 of 2) Never done      No completion history exists for this topic.                      Upcoming       Pneumococcal Vaccine: 50+ Years (2 of 2 - PCV) Postponed until 2/19/2026 09/30/2014  Imm Admin: Pneumococcal polysaccharide vaccine (PPSV-23)              IMM DTaP/Tdap/Td Vaccine (1 - Tdap) Postponed until 2/26/2026     No completion history exists for this topic.              Diabetes: Urine Protein Screening (Yearly) Next due on 7/9/2025 07/09/2024  MICROALBUMIN CREAT RATIO URINE    06/22/2023  MICROALBUMIN CREAT RATIO URINE    01/10/2022  MICROALBUMIN CREAT RATIO URINE    09/16/2020  MICROALBUMIN CREAT RATIO URINE    06/27/2019  MICROALBUMIN CREAT RATIO URINE (LAB COLLECT)      Only the first 5 history entries have been loaded, but more history exists.            Diabetes: Monofilament / LE Exam (Yearly) Next due on 7/9/2025 07/09/2024  [employer]: FINDINGS - TESTS - NEUROLOGY - MONOFILAMENT TEST - LEFT FOOT - NUMBER OF SITES TESTED    07/09/2024  [employer]: FINDINGS - TESTS - NEUROLOGY - MONOFILAMENT TEST - RIGHT FOOT - NUMBER OF SITES TESTED    07/09/2024  Diabetic Monofilament LE Exam    04/12/2023  Diabetic Monofilament LE Exam    01/11/2022  Diabetic Monofilament Lower Extremity Exam      Only the first 5 history entries have been loaded, but more history exists.              A1c Screening (Every 6 Months) Next due on 8/19/2025 02/19/2025  POCT Hemoglobin A1C    07/09/2024  POCT  A1C    02/20/2024  POCT  A1C    08/16/2023  POCT Hemoglobin A1C    04/12/2023  POCT Hemoglobin A1C      Only the first 5 history entries have been loaded, but more history exists.              Influenza Vaccine (Season Ended) Next due on 9/1/2025 09/30/2014  Imm Admin: Influenza Seasonal Injectable - Historical Data    09/30/2014  Imm Admin: INFLUENZA TIV (IM)              Fasting Lipid Profile (Yearly) Next due on 2/19/2026 02/19/2025  Lipid Profile    05/19/2023  Lipid Profile    01/07/2022  Lipid Profile    09/16/2020  Lipid Profile    06/27/2019  Lipid Profile      Only the first 5 history entries have been loaded, but more history exists.              SERUM CREATININE (Yearly) Next due on 2/19/2026 02/19/2025  Comp Metabolic Panel    07/10/2024  Comp Metabolic Panel    02/22/2024  Basic Metabolic Panel    11/14/2023  Basic Metabolic Panel    05/19/2023  Basic Metabolic Panel      Only the first 5 history entries have been loaded, but more history exists.              Mammogram (Yearly) Next due on 3/14/2026      03/14/2025  MA-SCREENING MAMMO BILAT W/TOMOSYNTHESIS W/CAD    10/18/2023  MA-SCREENING MAMMO BILAT W/TOMOSYNTHESIS W/CAD    10/11/2022  MA-SCREENING MAMMO BILAT  W/TOMOSYNTHESIS W/CAD    08/20/2021  MA-SCREENING MAMMO BILAT W/TOMOSYNTHESIS W/CAD    11/13/2019  MA-SCREENING MAMMO BILAT W/TOMOSYNTHESIS W/CAD      Only the first 5 history entries have been loaded, but more history exists.              Annual Wellness Visit (Yearly) Next due on 5/13/2026 05/13/2025  Level of Service: ME ANNUAL WELLNESS VISIT-INCLUDES PPPS SUBSEQUE*    04/14/2025  Level of Service: ME ANNUAL WELLNESS VISIT-INCLUDES PPPS SUBSEQUE*    04/14/2025  Visit Dx: Encounter for Medicare annual wellness exam    05/13/2024  Level of Service: ME ANNUAL WELLNESS VISIT-INCLUDES PPPS SUBSEQUE*    03/18/2024  Level of Service: ANNUAL WELLNESS VISIT-INCLUDES PPPS SUBSEQUE*      Only the first 5 history entries have been loaded, but more history exists.              Bone Density Scan (Every 5 Years) Next due on 3/14/2030      03/14/2025  DS-BONE DENSITY STUDY (DEXA)    06/02/2009  DS-BONE DENSITY STUDY (DEXA)              Colorectal Cancer Screening (Colon Cancer Screening Cologuard Stool (FIT DNA) - Preferred) Next due on 2/28/2034 02/29/2024  Surgical Procedure: ME COLONOSCOPY,DIAGNOSTIC    08/09/2023  COLONOSCOPY RESULTS    05/08/2023  COLOGUARD COLON CANCER SCREENING    05/08/2023  Noninvasive Colorectal Cancer DNA and Occult Blood Screening component of COLOGUARD COLON CANCER SCREENING    08/28/2014  AMB REFERRAL TO GI FOR COLONOSCOPY      Only the first 5 history entries have been loaded, but more history exists.                      Completed or No Longer Recommended       Hepatitis C Screening  Completed      05/21/2018  Hepatitis C Antibody component of HEP C VIRUS ANTIBODY              Hepatitis A Vaccine (Hep A) (Series Information) Aged Out      No completion history exists for this topic.              Hepatitis B Vaccine (Hep B) (Series Information) Aged Out     No completion history exists for this topic.              HPV Vaccines (Series Information) Aged Out     No completion history  "exists for this topic.              Polio Vaccine (Inactivated Polio) (Series Information) Aged Out     No completion history exists for this topic.              Meningococcal Immunization (Series Information) Aged Out     No completion history exists for this topic.              Cervical Cancer Screening  Discontinued        Frequency changed to Never automatically (Topic No Longer Applies)    05/01/2018  THINPREP PAP WITH HPV    05/01/2018  PATHOLOGY GYN SPECIMEN    02/19/2015  PATHOLOGY GYN SPECIMEN    02/19/2015  THINPREP PAP W/HPV AND CTNG     Only the first 5 history entries have been loaded, but more history exists.                          Patient Care Team:  ALON Garland as PCP - General (Nurse Practitioner Family)  Elvin Monterroso M.D. as Consulting Physician (Endocrinology)    Financial Resource Strain: Medium Risk (5/13/2025)    Overall Financial Resource Strain (CARDIA)     Difficulty of Paying Living Expenses: Somewhat hard      Transportation Needs: No Transportation Needs (5/13/2025)    PRAPARE - Transportation     Lack of Transportation (Medical): No     Lack of Transportation (Non-Medical): No      Food Insecurity: No Food Insecurity (5/13/2025)    Hunger Vital Sign     Worried About Running Out of Food in the Last Year: Never true     Ran Out of Food in the Last Year: Never true     Offered resources, declined at this time.        Encounter Vitals  Temperature: 36.8 °C (98.2 °F)  Blood Pressure : 138/52  Pulse: 68  Pulse Oximetry: 95 %  Weight: 62.7 kg (138 lb 3.2 oz)  Height: 170.2 cm (5' 7\")  BMI (Calculated): 21.65  Pain Score: No pain     ROS:  No fever, chills, nausea, vomiting, diarrhea, chest pain or shortness of breath. See HPI.    Physical Exam  Vitals reviewed.   Constitutional:       Appearance: Normal appearance.   Cardiovascular:      Rate and Rhythm: Normal rate and regular rhythm.      Pulses: Normal pulses.      Heart sounds: Normal heart sounds.   Pulmonary:      " Effort: Pulmonary effort is normal.      Breath sounds: Normal breath sounds.   Skin:     General: Skin is warm and dry.      Capillary Refill: Capillary refill takes less than 2 seconds.   Neurological:      General: No focal deficit present.      Mental Status: She is alert and oriented to person, place, and time.   Psychiatric:         Mood and Affect: Mood normal.       Assessment and Plan. The following treatment and monitoring plan is recommended:    Dyslipidemia associated with type 2 diabetes mellitus (HCC)  Chronic, stable. Reviewed lipid profile from February 2025. Currently taking simvastatin 20 mg daily. Provided education on heart healthy diet with adequate intake of fruits, vegetables, and whole grains. Encourage 30 minutes of moderate exercise 3-4 times a week. Provided [facility] gym resources. Denies chest pain and claudication. Routinely monitored by primary care provider.    Graves' disease  Chronic, stable. Currently taking methimazole 10 mg twice daily. Reports that she is currently asymptomatic, but was previously having palpitations. Reports that she has an appointment scheduled in 2 weeks to establish care with endocrinology, Dr. Wiggins.     Hypertension associated with type 2 diabetes mellitus (HCC)  Chronic, stable. Currently taking atenolol 25 mg and lisinopril-hydrochlorothiazide 10-12.5 mg daily. In-office blood pressure is 138/52. Denies chest pain, lightheadedness, and lower extremity edema. Continue to follow with primary care provider for medication and symptom management.    Tobacco use  Chronic, ongoing. Reports that she is working on quitting, but continues to smoke 1 cigarette/day. Provided smoking cessation resources. Monitored by primary care provider.    Type 2 diabetes mellitus with hyperglycemia, with long-term current use of insulin (HCC)  Chronic, ongoing. Most recent hemoglobin A1C was 7.5 in February 2025. Currently taking pioglitazone 30 mg and 14 units of  insulin glargine daily. Routinely monitored by primary care provider.        Services suggested: No services needed at this time  Health Care Screening: Age-appropriate preventive services recommended by USPTF and ACIP covered by Medicare were discussed today. Services ordered if indicated and agreed upon by the patient.  Referrals offered: Community-based lifestyle interventions to reduce health risks and promote self-management and wellness, fall prevention, nutrition, physical activity, tobacco-use cessation, weight loss, and mental health services as per orders if indicated.    Discussion today about general wellness and lifestyle habits:    Prevent falls and reduce trip hazards; Cautioned about securing or removing rugs.  Have a working fire alarm and carbon monoxide detector.  Engage in regular physical activity and social activities.    Follow-up: Return for appointment with Primary Care Provider as needed.

## 2025-05-13 NOTE — ASSESSMENT & PLAN NOTE
Chronic, ongoing. Reports that she is working on quitting, but continues to smoke 1 cigarette/day. Provided smoking cessation resources. Monitored by primary care provider.

## 2025-05-20 DIAGNOSIS — E78.5 DYSLIPIDEMIA: ICD-10-CM

## 2025-05-20 PROCEDURE — RXMED WILLOW AMBULATORY MEDICATION CHARGE

## 2025-05-20 RX ORDER — SIMVASTATIN 20 MG
20 TABLET ORAL EVERY EVENING
Qty: 100 TABLET | Refills: 3 | Status: SHIPPED | OUTPATIENT
Start: 2025-05-20

## 2025-05-20 NOTE — TELEPHONE ENCOUNTER
Received request via: Pharmacy    Was the patient seen in the last year in this department? Yes    Does the patient have an active prescription (recently filled or refills available) for medication(s) requested? No    Pharmacy Name:  [facility] [place]     Does the patient have senior living Plus and need 100-day supply? (This applies to ALL medications) Yes, quantity updated to 100 days

## 2025-05-23 DIAGNOSIS — E11.9 TYPE 2 DIABETES MELLITUS WITHOUT COMPLICATION, WITHOUT LONG-TERM CURRENT USE OF INSULIN (HCC): ICD-10-CM

## 2025-05-23 PROCEDURE — RXMED WILLOW AMBULATORY MEDICATION CHARGE

## 2025-05-23 RX ORDER — INSULIN GLARGINE 100 [IU]/ML
INJECTION, SOLUTION SUBCUTANEOUS
Qty: 15 ML | Refills: 5 | Status: SHIPPED | OUTPATIENT
Start: 2025-05-23

## 2025-05-23 NOTE — TELEPHONE ENCOUNTER
Received request via: Patient    Was the patient seen in the last year in this department? Yes    Does the patient have an active prescription (recently filled or refills available) for medication(s) requested? No    [facility]    Does the patient have nursing home Plus and need 100-day supply? (This applies to ALL medications) Yes, quantity updated to 100 days

## 2025-05-27 ENCOUNTER — PHARMACY VISIT (OUTPATIENT)
Dept: PHARMACY | Facility: MEDICAL CENTER | Age: 70
End: 2025-05-27
Payer: COMMERCIAL

## 2025-06-03 ENCOUNTER — APPOINTMENT (OUTPATIENT)
Dept: ENDOCRINOLOGY | Facility: MEDICAL CENTER | Age: 70
End: 2025-06-03
Attending: INTERNAL MEDICINE
Payer: MEDICARE

## 2025-06-18 ENCOUNTER — APPOINTMENT (OUTPATIENT)
Dept: MEDICAL GROUP | Facility: MEDICAL CENTER | Age: 70
End: 2025-06-18
Payer: MEDICARE

## 2025-07-08 ENCOUNTER — TELEPHONE (OUTPATIENT)
Dept: MEDICAL GROUP | Facility: MEDICAL CENTER | Age: 70
End: 2025-07-08
Payer: MEDICARE

## 2025-07-08 NOTE — TELEPHONE ENCOUNTER
Phone Number Called: [phone]    Call outcome: Left detailed message for patient. Informed to call back with any additional questions.    Message: I attempted to call patient to let her know that her upcoming appointment on 07/16 would need to be rescheduled due to her provider being out of the office. I left her a detailed message to return our call and a mychart message was sent.

## 2025-07-30 ENCOUNTER — PATIENT MESSAGE (OUTPATIENT)
Dept: MEDICAL GROUP | Facility: MEDICAL CENTER | Age: 70
End: 2025-07-30
Payer: MEDICARE

## 2025-07-30 DIAGNOSIS — Z79.4 TYPE 2 DIABETES MELLITUS WITH HYPERGLYCEMIA, WITH LONG-TERM CURRENT USE OF INSULIN (HCC): Primary | ICD-10-CM

## 2025-07-30 DIAGNOSIS — E11.65 TYPE 2 DIABETES MELLITUS WITH HYPERGLYCEMIA, WITH LONG-TERM CURRENT USE OF INSULIN (HCC): Primary | ICD-10-CM

## 2025-08-18 ENCOUNTER — APPOINTMENT (OUTPATIENT)
Dept: MEDICAL GROUP | Facility: MEDICAL CENTER | Age: 70
End: 2025-08-18
Payer: MEDICARE

## (undated) DEVICE — KIT  I.V. START (100EA/CA)

## (undated) DEVICE — SYRINGE SAFETY 5 ML 18 GA X 1-1/2 BLUNT LL (100/BX 4BX/CA)

## (undated) DEVICE — DEVICE HEMOSTATIC CLIPPING RESOLUTION 360 DEGREES (20EA/BX)

## (undated) DEVICE — SET EXTENSION WITH 2 PORTS (48EA/CA) ***PART #2C8610 IS A SUBSTITUTE*****

## (undated) DEVICE — TUBE E-T HI-LO CUFF 6.5MM (10EA/BX)

## (undated) DEVICE — LACTATED RINGERS INJ 1000 ML - (14EA/CA 60CA/PF)

## (undated) DEVICE — SYRINGE SAFETY 10 ML 18 GA X 1 1/2 BLUNT LL (100/BX 4BX/CA)

## (undated) DEVICE — CANISTER SUCTION RIGID RED 1500CC (40EA/CA)

## (undated) DEVICE — TUBE CONNECTING SUCTION - CLEAR PLASTIC STERILE 72 IN (50EA/CA)

## (undated) DEVICE — SENSOR OXIMETER ADULT SPO2 RD SET (20EA/BX)

## (undated) DEVICE — CATHERTER CLEAR SINGLE USE INJECTION THERAPY NEEDLE 25GA X 4MM  2.3MM X 240CM (5EA/BX)

## (undated) DEVICE — SYRINGE SAFETY 3 ML 18 GA X 1 1/2 BLUNT LL (100/BX 8BX/CA)

## (undated) DEVICE — PAD PREP 24 X 48 CUFFED - (100/CA)

## (undated) DEVICE — CAPTIVATOR II-15MM ROUND STIFF  (40/BX)

## (undated) DEVICE — TOWEL STOP TIMEOUT SAFETY FLAG (40EA/CA)

## (undated) DEVICE — TUBING CLEARLINK DUO-VENT - C-FLO (48EA/CA)

## (undated) DEVICE — GOWN SURGEONS LARGE - (32/CA)

## (undated) DEVICE — FORCEP RADIAL JAW 4 STANDARD CAPACITY W/NEEDLE 240CM (40EA/BX)

## (undated) DEVICE — CAPTIVATOR II-25MM ROUND STIFF  (40/BX)

## (undated) DEVICE — SPONGE GAUZE NON-STERILE 4X4 - (2000/CA 10PK/CA)

## (undated) DEVICE — CANNULA O2 COMFORT SOFT EAR ADULT 7 FT TUBING (50/CA)

## (undated) DEVICE — COVER LIGHT HANDLE FLEXIBLE - SOFT (2EA/PK 80PK/CA)